# Patient Record
Sex: FEMALE | Race: WHITE | Employment: OTHER
[De-identification: names, ages, dates, MRNs, and addresses within clinical notes are randomized per-mention and may not be internally consistent; named-entity substitution may affect disease eponyms.]

---

## 2017-01-16 ENCOUNTER — TELEPHONE (OUTPATIENT)
Dept: CASE MANAGEMENT | Age: 57
End: 2017-01-16

## 2018-07-06 ASSESSMENT — ENCOUNTER SYMPTOMS
ABDOMINAL DISTENTION: 0
SINUS PRESSURE: 0
EYE ITCHING: 0
VOMITING: 0
ABDOMINAL PAIN: 0
CONSTIPATION: 0
WHEEZING: 0
SORE THROAT: 0
EYE DISCHARGE: 0
VOICE CHANGE: 0
NAUSEA: 0
EYE PAIN: 0
SINUS PAIN: 0
COUGH: 0
DIARRHEA: 0
BACK PAIN: 0
EYES NEGATIVE: 1
CHOKING: 0
RHINORRHEA: 0
ROS SKIN COMMENTS: DENIES BREAST SKIN CHANGES, ARM SWELLING, OR PALPABLE AXILLARY OR SUPRACLAVICULAR ADENOPATHY.
COLOR CHANGE: 0
APNEA: 0
CHEST TIGHTNESS: 0
SHORTNESS OF BREATH: 0
BLOOD IN STOOL: 0
TROUBLE SWALLOWING: 0

## 2018-07-06 NOTE — PROGRESS NOTES
Subjective:  Stage I ER/UT positive, HER-2/luis negative ID carcinoma of the right breast.  Oncotype DX recurrence score of 5. Patient ID: Charmayne Dubois is a 59 y.o. female. HPI  Patient is here for a follow up visit of her Stage I ER/UT positive, HER-2/luis negative carcinoma of the right breast.    She underwent a right simple mastectomy, right axillary sentinel lymph node excision, right axillary dissection on March 2, 2016. Pathological evaluation demonstrated:  A. 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 Tumor Size: Size of Largest Invasive Carcinoma 2.0 cm. Margins of excision are negative for invasive carcinoma. Skin and nipple, negative for invasive carcinoma  B. Right sentinel lymph nodes #1: Two lymph nodes, negative for neoplasm;  C. [place] lymph node #2: Single lymph node, negative for neoplasm;  D. Upper inner chest wall mass: Invasive ductal carcinoma involving fibrovascular and neurovascular tissue, negative for lymph node. Pathologic Staging:  Primary tumor- pT 1c  Regional lymph nodes-(sn)pN 0(i-)  Distant metastases- pM x  Additional Pathologic Findings-invasive ductal carcinoma involving soft tissue of upper/inner quadrant chest    Additional note: The invasive carcinoma present in the specimen designated as upper/inner quadrant is discontinuous from the centrally located invasive carcinoma and does not involve lymphoid tissue. Tumor involves the inked margins of excision in this specimen. Intradepartmental consultation is obtained.  Case discussed with Dr. Michelle Blake 3/7/2016.      1/16/17, Left diagnostic mammogram, [facility]:  TISSUE DENSITY:   There are scattered fibroglandular densities (Type 2 density).       MAMMOGRAM FINDINGS:   Finding 1:   No suspicious masses, calcifications, or other abnormalities are seen. [place] are no significant changes from the prior study.       IMPRESSION:   No mammographic evidence of malignancy.       Screening mammogram in 1 year is recommended.       =======================================   BI-RADS Category 1:  Negative   =======================================     Past Medical History:   Diagnosis Date    Anxiety     Cancer ([address]     breast right    GERD (gastroesophageal reflux disease)     Headache     Hyperlipidemia     Hypertension     PONV (postoperative nausea and vomiting)      Past Surgical History:   Procedure Laterality Date    APPENDECTOMY      CARDIOVASCULAR STRESS TEST      CHOLECYSTECTOMY  1984    COLONOSCOPY      ENDOSCOPY, COLON, DIAGNOSTIC      HERNIA REPAIR  2009,2010    HYSTERECTOMY  2009    MASTECTOMY Right 2015    simple, blue dye, with right axillary sentinel lymph node excision    UPPER GASTROINTESTINAL ENDOSCOPY  04/03/2017     Current Outpatient Prescriptions on File Prior to Visit   Medication Sig Dispense Refill    b complex vitamins capsule Take 1 capsule by mouth daily      omeprazole (PRILOSEC OTC) 20 MG tablet TAKE ONE TABLET BY MOUTH ONCE DAILY WITH BREAKFAST 30 tablet 5    lisinopril (PRINIVIL;ZESTRIL) 5 MG tablet TAKE ONE TABLET BY MOUTH DAILY 30 tablet 5    ibuprofen (ADVIL;MOTRIN) 200 MG tablet Take 200 mg by mouth every 6 hours as needed for Pain      azelastine (ASTELIN) 0.1 % nasal spray 2 sprays by Nasal route 2 times daily Use in each nostril as directed 1 Bottle 3    fluticasone (FLONASE) 50 MCG/ACT nasal spray 2 sprays by Nasal route daily 1 Bottle 3    RESTASIS 0.05 % ophthalmic emulsion Apply 1 drop to eye 2 times daily  0    ondansetron (ZOFRAN ODT) 4 MG disintegrating tablet Take 1 tablet by mouth every 8 hours as needed for Nausea or Vomiting (Nausea or Dry heaves) 15 tablet 0    ibandronate (BONIVA) 150 MG tablet Take 1 tablet by mouth every 30 days      Calcium Carbonate-Vit D-Min (CALCIUM 1200 PO) Take 1 tablet by mouth daily Last dose 3/2/2017      anastrozole (ARIMIDEX) 1 MG tablet Take 1 mg by mouth daily      triamcinolone (KENALOG) 0.1 Normocephalic and atraumatic. Mouth/Throat: Oropharynx is clear and moist. No oropharyngeal exudate. Eyes: Conjunctivae and EOM are normal. Right eye exhibits no discharge. Left eye exhibits no discharge. No scleral icterus. Neck: Normal range of motion. Neck supple. No JVD present. No tracheal deviation present. No thyromegaly present. Cardiovascular: Normal rate, regular rhythm and normal heart sounds. Exam reveals no gallop and no friction rub. No murmur heard. Pulmonary/Chest: Effort normal and breath sounds normal. No stridor. No respiratory distress. She exhibits no mass, no tenderness, no bony tenderness, no laceration, no edema, no deformity, no swelling and no retraction. Right breast exhibits no inverted nipple, no mass, no nipple discharge, no skin change and no tenderness. Left breast exhibits no inverted nipple, no mass, no nipple discharge, no skin change and no tenderness. Breasts are asymmetrical.       Right mastectomy scar intact, no nodules or masses. No axillary adenopathy. Abdominal: Soft. She exhibits no distension. There is no tenderness. There is no rebound and no guarding. Musculoskeletal: Normal range of motion. She exhibits no edema, tenderness or deformity. Right shoulder: Normal.        Left shoulder: Normal.   Lymphadenopathy:     She has no cervical adenopathy. Right cervical: No superficial cervical, no deep cervical and no posterior cervical adenopathy present. Left cervical: No superficial cervical, no deep cervical and no posterior cervical adenopathy present. She has no axillary adenopathy. Right axillary: No pectoral and no lateral adenopathy present. Left axillary: No pectoral and no lateral adenopathy present. Right: No supraclavicular adenopathy present. Left: No supraclavicular adenopathy present. Neurological: She is alert and oriented to person, place, and time.  Coordination normal.   Skin: Skin is warm and dry. No rash noted. She is not diaphoretic. No erythema. No pallor. Psychiatric: She has a normal mood and affect. Her behavior is normal. Judgment and thought content normal.   Nursing note and vitals reviewed. Assessment:      59 y.o. woman without unusual risk factors for carcinoma of the breast, who underwent a right simple mastectomy, right axillary sentinel lymph node excision, right axillary dissection on March 2, 2016. Pathological evaluation demonstrated:  A. 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 Tumor Size: Size of Largest Invasive Carcinoma 2.0 cm. Margins of excision are negative for invasive carcinoma. Skin and nipple, negative for invasive carcinoma  B. Right sentinel lymph nodes #1: Two lymph nodes, negative for neoplasm;  C. [place] lymph node #2: Single lymph node, negative for neoplasm;  D. Upper inner chest wall mass: Invasive ductal carcinoma involving fibrovascular and neurovascular tissue, negative for lymph node. Pathologic Staging:  Primary tumor- pT 1c  Regional lymph nodes-(sn)pN 0(i-)  Distant metastases- pM x  Additional Pathologic Findings-invasive ductal carcinoma involving soft tissue of upper/inner quadrant chest    Additional note: The invasive carcinoma present in the specimen designated as upper/inner quadrant is discontinuous from the centrally located invasive carcinoma and does not involve lymphoid tissue. Tumor involves the inked margins of excision in this specimen. Intradepartmental consultation is obtained. Case discussed with Dr. Chris Haynes 3/7/2016.    -Oncotype DX recurrence score 5 (low risk). -Radiation therapy: Completed 5/24/2016.   -Endocrine therapy:  Started on Arimidex in May 2016 per Dr. Rani Paula with continued good tolerance to date.  -1/17/18, Left mammogram [facility]):  Negative.  -09/26/2018:  DEXA Bone density:  Osteoporosis of LS; Normal bone density of the femoral neck.   On Ca/Vit D and Boniva. Clinically, there is no evidence of recurrent breast cancer.      -Right chest wall and lateral rib pain: Resolved. .        - Persistent tobacco abuse:  Has cut back significantly. Stress of her mother's recent death has increased desire smoking. She was told she is ineligible for the CT screen for smokers. Patient currently smokes 1/2 pack a day         Plan:        1. Continue monthly breast self examination. Bring any changes to your physician's attention. 2. Routine screening imaging in January 2019 (ordered). 3. Continue Arimidex 1 mg daily. 4. Calcium and vitamin D daily while on Arimidex. Also takes Boniva monthly. 5. Smoking cessation reviewed again today. 6. DEXA 09/21/2019 (ordered). 7. Avoid excessive caffeine intake. 8. Oncology appointments as scheduled with Oncologist-Dr. Rani Paula, Radiation Oncologist-Casey, and primary care. 9. Follow up in 6 months with mammogram (Left) same day. Nina Puente, RN, MSN, ACNP-BC, AOCNP  Advanced Oncology Certified Nurse Practitioner  Department of Breast Surgery  [facility]/  [facility] in collaboration with Dr. Author Hunter. Jaime Paula and Dr. Ilda Esquivel.

## 2018-07-16 ENCOUNTER — TELEPHONE (OUTPATIENT)
Dept: FAMILY MEDICINE CLINIC | Age: 58
End: 2018-07-16

## 2018-07-16 DIAGNOSIS — I10 ESSENTIAL HYPERTENSION: ICD-10-CM

## 2018-07-16 DIAGNOSIS — Z78.0 POSTMENOPAUSAL: Primary | ICD-10-CM

## 2018-07-16 DIAGNOSIS — Z79.810 USE OF TAMOXIFEN (NOLVADEX): ICD-10-CM

## 2018-07-17 ENCOUNTER — HOSPITAL ENCOUNTER (OUTPATIENT)
Age: 58
Discharge: HOME | End: 2018-07-19
Payer: COMMERCIAL

## 2018-07-17 ENCOUNTER — NURSE ONLY (OUTPATIENT)
Dept: FAMILY MEDICINE CLINIC | Age: 58
End: 2018-07-17
Payer: COMMERCIAL

## 2018-07-17 DIAGNOSIS — Z79.810 USE OF TAMOXIFEN (NOLVADEX): ICD-10-CM

## 2018-07-17 DIAGNOSIS — Z79.810 USE OF TAMOXIFEN (NOLVADEX): Primary | ICD-10-CM

## 2018-07-17 DIAGNOSIS — I10 ESSENTIAL HYPERTENSION: ICD-10-CM

## 2018-07-17 DIAGNOSIS — Z78.0 POSTMENOPAUSAL: ICD-10-CM

## 2018-07-17 LAB
ALBUMIN SERPL-MCNC: 4.2 G/DL (ref 3.5–5.2)
ALP BLD-CCNC: 66 U/L (ref 35–104)
ALT SERPL-CCNC: 18 U/L (ref 0–32)
ANION GAP SERPL CALCULATED.3IONS-SCNC: 17 MMOL/L (ref 7–16)
AST SERPL-CCNC: 26 U/L (ref 0–31)
BASOPHILS ABSOLUTE: 0.04 E9/L (ref 0–0.2)
BASOPHILS RELATIVE PERCENT: 0.5 % (ref 0–2)
BILIRUB SERPL-MCNC: <0.2 MG/DL (ref 0–1.2)
BUN BLDV-MCNC: 15 MG/DL (ref 8–23)
CALCIUM SERPL-MCNC: 9.6 MG/DL (ref 8.6–10.2)
CHLORIDE BLD-SCNC: 102 MMOL/L (ref 98–107)
CHOLESTEROL, TOTAL: 248 MG/DL (ref 0–199)
CO2: 21 MMOL/L (ref 22–29)
CREAT SERPL-MCNC: 1 MG/DL (ref 0.5–1)
EOSINOPHILS ABSOLUTE: 0.11 E9/L (ref 0.05–0.5)
EOSINOPHILS RELATIVE PERCENT: 1.5 % (ref 0–6)
GFR AFRICAN AMERICAN: >60
GFR NON-AFRICAN AMERICAN: 56 ML/MIN/1.73
GLUCOSE BLD-MCNC: 91 MG/DL (ref 74–109)
HCT VFR BLD CALC: 40.7 % (ref 34–48)
HDLC SERPL-MCNC: 26 MG/DL
HEMOGLOBIN: 12.6 G/DL (ref 11.5–15.5)
IMMATURE GRANULOCYTES #: 0.03 E9/L
IMMATURE GRANULOCYTES %: 0.4 % (ref 0–5)
LDL CHOLESTEROL CALCULATED: ABNORMAL MG/DL (ref 0–99)
LYMPHOCYTES ABSOLUTE: 1.44 E9/L (ref 1.5–4)
LYMPHOCYTES RELATIVE PERCENT: 19.5 % (ref 20–42)
MCH RBC QN AUTO: 25.1 PG (ref 26–35)
MCHC RBC AUTO-ENTMCNC: 31 % (ref 32–34.5)
MCV RBC AUTO: 81.1 FL (ref 80–99.9)
MONOCYTES ABSOLUTE: 0.47 E9/L (ref 0.1–0.95)
MONOCYTES RELATIVE PERCENT: 6.4 % (ref 2–12)
NEUTROPHILS ABSOLUTE: 5.28 E9/L (ref 1.8–7.3)
NEUTROPHILS RELATIVE PERCENT: 71.7 % (ref 43–80)
PDW BLD-RTO: 16.5 FL (ref 11.5–15)
PLATELET # BLD: 391 E9/L (ref 130–450)
PMV BLD AUTO: 9.7 FL (ref 7–12)
POTASSIUM SERPL-SCNC: 4.9 MMOL/L (ref 3.5–5)
RBC # BLD: 5.02 E12/L (ref 3.5–5.5)
SODIUM BLD-SCNC: 140 MMOL/L (ref 132–146)
TOTAL PROTEIN: 8 G/DL (ref 6.4–8.3)
TRIGL SERPL-MCNC: 514 MG/DL (ref 0–149)
VITAMIN D 25-HYDROXY: 27 NG/ML (ref 30–100)
VLDLC SERPL CALC-MCNC: ABNORMAL MG/DL
WBC # BLD: 7.4 E9/L (ref 4.5–11.5)

## 2018-07-17 PROCEDURE — 80053 COMPREHEN METABOLIC PANEL: CPT

## 2018-07-17 PROCEDURE — 85025 COMPLETE CBC W/AUTO DIFF WBC: CPT

## 2018-07-17 PROCEDURE — 36415 COLL VENOUS BLD VENIPUNCTURE: CPT | Performed by: FAMILY MEDICINE

## 2018-07-17 PROCEDURE — 82306 VITAMIN D 25 HYDROXY: CPT

## 2018-07-17 PROCEDURE — 80061 LIPID PANEL: CPT

## 2018-07-20 DIAGNOSIS — I10 ESSENTIAL HYPERTENSION: ICD-10-CM

## 2018-07-20 RX ORDER — LISINOPRIL 5 MG/1
TABLET ORAL
Qty: 30 TABLET | Refills: 5 | Status: SHIPPED | OUTPATIENT
Start: 2018-07-20 | End: 2019-01-15 | Stop reason: SDUPTHER

## 2018-07-20 RX ORDER — OMEPRAZOLE 20 MG/1
TABLET, DELAYED RELEASE ORAL
Qty: 30 TABLET | Refills: 5 | Status: SHIPPED | OUTPATIENT
Start: 2018-07-20 | End: 2019-01-15 | Stop reason: SDUPTHER

## 2018-07-26 ENCOUNTER — OFFICE VISIT (OUTPATIENT)
Dept: FAMILY MEDICINE CLINIC | Age: 58
End: 2018-07-26
Payer: COMMERCIAL

## 2018-07-26 DIAGNOSIS — E78.1 HYPERTRIGLYCERIDEMIA: ICD-10-CM

## 2018-07-26 DIAGNOSIS — Z12.11 COLON CANCER SCREENING: ICD-10-CM

## 2018-07-26 DIAGNOSIS — I10 ESSENTIAL HYPERTENSION: Primary | ICD-10-CM

## 2018-07-26 DIAGNOSIS — F43.22 ADJUSTMENT DISORDER WITH ANXIOUS MOOD: ICD-10-CM

## 2018-07-26 PROCEDURE — G8417 CALC BMI ABV UP PARAM F/U: HCPCS | Performed by: FAMILY MEDICINE

## 2018-07-26 PROCEDURE — 4004F PT TOBACCO SCREEN RCVD TLK: CPT | Performed by: FAMILY MEDICINE

## 2018-07-26 PROCEDURE — G8427 DOCREV CUR MEDS BY ELIG CLIN: HCPCS | Performed by: FAMILY MEDICINE

## 2018-07-26 PROCEDURE — 3014F SCREEN MAMMO DOC REV: CPT | Performed by: FAMILY MEDICINE

## 2018-07-26 PROCEDURE — 99213 OFFICE O/P EST LOW 20 MIN: CPT | Performed by: FAMILY MEDICINE

## 2018-07-26 PROCEDURE — 3017F COLORECTAL CA SCREEN DOC REV: CPT | Performed by: FAMILY MEDICINE

## 2018-07-26 ASSESSMENT — PATIENT HEALTH QUESTIONNAIRE - PHQ9
SUM OF ALL RESPONSES TO PHQ QUESTIONS 1-9: 1
SUM OF ALL RESPONSES TO PHQ9 QUESTIONS 1 & 2: 1
1. LITTLE INTEREST OR PLEASURE IN DOING THINGS: 0
2. FEELING DOWN, DEPRESSED OR HOPELESS: 1

## 2018-07-26 NOTE — PROGRESS NOTES
[facility]  Office Progress Note - Dr. Kamila Anderson  18    CC:   Chief Complaint   Patient presents with    Anxiety     6 mo check up, under a lot of stress- started smoking         S: Anxiety   Not feeling herself recently. Mom . Financial issues from that. Family is fighting. They are taking a vacation soon for \" a breather. \"  Started smoking again. She did well for about 2 months and then couldn't deal with the stress. She doesn't want meds or therapy. She isnt open to treatment options but does appear to benefit from venting today. Hypertension  Follow-up  Blood pressure is not controlled today. Initially. Better on recheck. BP Readings from Last 3 Encounters:   18 124/74   18 130/80   18 120/80     Patient continues medications regularly. Compliance is good. Denies CP, sob, abd pain, headaches, vision changes, dizziness, hypotensive symptoms. No side effects from medications noted. +frequent urination. She has noted some mild incontinence after she urinates. But never before. She feels like she is having complete emptying.      Past Medical History:   Diagnosis Date    Anxiety     Cancer [facility])     breast right    GERD (gastroesophageal reflux disease)     Headache     Hyperlipidemia     Hypertension     PONV (postoperative nausea and vomiting)        Family History   Problem Relation Age of Onset    High Blood Pressure Mother     Asthma Mother     Breast Cancer Mother     Cancer Mother          breast cancer    Heart Disease Father     High Blood Pressure Sister     High Blood Pressure Sister     Cancer Maternal Aunt         breast cancer    Cancer Other         Maternal Aunt Esophageal cancer       Past Surgical History:   Procedure Laterality Date    APPENDECTOMY      CARDIOVASCULAR STRESS TEST      CHOLECYSTECTOMY      COLONOSCOPY      ENDOSCOPY, COLON, DIAGNOSTIC      HERNIA REPAIR  [phone]    HYSTERECTOMY

## 2018-07-29 VITALS
DIASTOLIC BLOOD PRESSURE: 74 MMHG | SYSTOLIC BLOOD PRESSURE: 124 MMHG | OXYGEN SATURATION: 98 % | HEART RATE: 88 BPM | TEMPERATURE: 98.6 F | WEIGHT: 140 LBS | BODY MASS INDEX: 27.34 KG/M2

## 2018-08-09 ENCOUNTER — PREP FOR PROCEDURE (OUTPATIENT)
Dept: SURGERY | Age: 58
End: 2018-08-09

## 2018-08-09 ENCOUNTER — OFFICE VISIT (OUTPATIENT)
Dept: SURGERY | Age: 58
End: 2018-08-09
Payer: COMMERCIAL

## 2018-08-09 VITALS
OXYGEN SATURATION: 97 % | SYSTOLIC BLOOD PRESSURE: 122 MMHG | HEART RATE: 83 BPM | TEMPERATURE: 98.3 F | WEIGHT: 140 LBS | HEIGHT: 60 IN | RESPIRATION RATE: 16 BRPM | BODY MASS INDEX: 27.48 KG/M2 | DIASTOLIC BLOOD PRESSURE: 84 MMHG

## 2018-08-09 DIAGNOSIS — K62.5 RECTAL BLEEDING: Primary | ICD-10-CM

## 2018-08-09 PROCEDURE — G8427 DOCREV CUR MEDS BY ELIG CLIN: HCPCS | Performed by: SURGERY

## 2018-08-09 PROCEDURE — G8417 CALC BMI ABV UP PARAM F/U: HCPCS | Performed by: SURGERY

## 2018-08-09 PROCEDURE — 4004F PT TOBACCO SCREEN RCVD TLK: CPT | Performed by: SURGERY

## 2018-08-09 PROCEDURE — 3014F SCREEN MAMMO DOC REV: CPT | Performed by: SURGERY

## 2018-08-09 PROCEDURE — 3017F COLORECTAL CA SCREEN DOC REV: CPT | Performed by: SURGERY

## 2018-08-09 PROCEDURE — 99213 OFFICE O/P EST LOW 20 MIN: CPT | Performed by: SURGERY

## 2018-08-09 RX ORDER — 0.9 % SODIUM CHLORIDE 0.9 %
10 VIAL (ML) INJECTION EVERY 12 HOURS SCHEDULED
Status: CANCELLED | OUTPATIENT
Start: 2018-08-09 | End: 2019-08-09

## 2018-08-09 RX ORDER — 0.9 % SODIUM CHLORIDE 0.9 %
10 VIAL (ML) INJECTION PRN
Status: CANCELLED | OUTPATIENT
Start: 2018-08-09 | End: 2019-08-09

## 2018-08-09 RX ORDER — SODIUM CHLORIDE 9 MG/ML
INJECTION, SOLUTION INTRAVENOUS CONTINUOUS
Status: CANCELLED | OUTPATIENT
Start: 2018-08-09 | End: 2019-08-09

## 2018-08-09 NOTE — PATIENT INSTRUCTIONS
Janey Arteaga MD, FACS    Preoperative Instructions    Please read the following information very carefully. It contains information that is necessary to best prepare you for your upcoming procedure. Make arrangements for a  to take you to and from your procedure. YOU MUST HAVE SOMEONE DRIVE YOU HOME - this cannot be a taxi or public transportation. You will not be administered anesthesia without someone to go home and be at home with you that day. Nothing to eat or drink after midnight the night before your procedure. Follow your bowel prep instructions if you have them for this procedure. 3 days prior to your procedure: Stop taking blood thinners like Coumadin or Plavix or Xarelto. 5 days prior to your procedure: Stop taking Aspirin or Aspirin containing products. If you cannot stop any of these medications prior to your procedure, please contact our office. Medications morning of procedure: Only heart, breathing, blood pressure, and seizure medications are permitted on the morning of your procedure. These medications can be taken with a sip of water. IF YOU ARE UNABLE TO KEEP THE ABOVE SCHEDULED PROCEDURE, YOU MUST NOTIFY DR. TSE'S OFFICE [phone]. NOT THE FACILITY. NO CHEWING GUM OR CHEWING TOBACCO AFTER MIDNIGHT ON DAY OF PROCEDURE.    YOU MUST HAVE TRANSPORTATION TO AND FROM THE FACILITY. What is a colonoscopy? A colonoscopy is a test that lets a doctor look inside your colon. The doctor uses a thin, lighted tube called a colonoscope to look for problems. These include small growths called polyps, cancer, or bleeding. During the test, the doctor can take samples of tissue that can be checked for cancer or other problems. This is called a biopsy. The doctor can also take out polyps. Before the test, you will need to stop eating solid foods. You also will drink a liquid or take a tablet that cleans out your colon.  This helps your and grape-flavored ice pops. It also includes fruit punch and cherry gelatin. · Drink the \"colon prep\" liquid as your doctor tells you. You will want to stay home, because the liquid will make you go to the bathroom a lot. Your stools will be loose and watery. It is very important to drink all of the liquid. If you have problems drinking it, call your doctor. Some doctors may have you take a tablet rather than drink a liquid. · Do not eat any solid foods after you drink the colon prep. · Stop drinking clear liquids 6 to 8 hours before the test.  What happens on the day of the procedure? · Follow the instructions exactly about when to stop eating and drinking. If you don't, your procedure may be canceled. If your doctor told you to take your medicines on the day of the procedure, take them with only a sip of water. · Take a bath or shower before you come in for your procedure. Do not apply lotions, perfumes, deodorants, or nail polish. · Take off all jewelry and piercings. And take out contact lenses, if you wear them. At the [address] [place] [address] or hospital  · Bring a picture ID. · You will be kept comfortable and safe by your anesthesia provider. The anesthesia may make you sleep. · You will lie on your back or your side with your knees drawn up toward your belly. The doctor will gently put a gloved finger into your anus. Then the doctor puts the scope in and moves it into your colon. The scope goes in easily because it is lubricated. · The doctor may also use small tools to take tissue samples for a biopsy or to remove polyps. This does not hurt. · The test usually takes 30 to 45 minutes. But it may take longer. It depends on what is found and what is done. Going home  · Be sure you have someone to drive you home. Anesthesia and pain medicine make it unsafe for you to drive. · You will be given more specific instructions about recovering from your procedure. When should you call your doctor?   · You have questions or concerns. · You don't understand how to prepare for your procedure. · You are having trouble with the bowel prep. · You become ill before the procedure (such as fever, flu, or a cold). · You need to reschedule or have changed your mind about having the procedure. Where can you learn more? Go to [email or web address] org and sign in to your [employer] account. Enter C315 in the ShopWell box to learn more about Colonoscopy: Before Your Procedure.     If you do not have an account, please click on the Sign Up Now link. © 7400-0806 Healthwise, Incorporated. Care instructions adapted under license by [facility] ([facility]). This care instruction is for use with your licensed healthcare professional. If you have questions about a medical condition or this instruction, always ask your healthcare professional. [address] any warranty or liability for your use of this information.   Content Version: 84.5.420859; Current as of: November 20, 2015

## 2018-08-10 NOTE — PROGRESS NOTES
PROCEDURE DATE ABD TIME - PT NOTIFIED AND GIVEN INSTRUCTIONS  PLEASE SEE PROCEDURE PACKET ([id number] [facility]) SCANNED INTO MEDIA TAB.   SCHEDULED AT [facility] FOR AN OUTPATIENT PROCEDURE  COLONOSCOPY  Electronically signed by Julia Dupont MA on 8/10/2018 at 7:44 AM

## 2018-09-07 NOTE — PROGRESS NOTES
Ben PRE-ADMISSION TESTING INSTRUCTIONS    The Preadmission Testing patient is instructed accordingly using the following criteria (check applicable):    ARRIVAL INSTRUCTIONS:  [x] Parking the day of Surgery is located in the Main Entrance lot. Upon entering the door, make an immediate right to the surgery reception desk    [x] Complimentary [address] is available Monday through Friday 6 am to 6 pm    [x] Bring photo ID and insurance card    [] Bring in a copy of Living will or Durable Power of  papers. [x] Please be sure to arrange for responsible adult to provide transportation to and from the hospital    [x] Please arrange for responsible adult to be with you for the 24 hour period post procedure due to having anesthesia      GENERAL INSTRUCTIONS:    [x] Nothing by mouth after midnight, including gum, candy, mints or water    [x] You may brush your teeth, but do not swallow any water    [x] Take medications as instructed with 1-2 oz of water    [x] Stop herbal supplements and vitamins 5 days prior to procedure    [] Follow preop dosing of blood thinners per physician instructions    [] Take 1/2 dose of evening insulin, but no insulin after midnight    [] No oral diabetic medications after midnight    [] If diabetic and have low blood sugar or feel symptomatic, take 1-2oz apple juice only    [] Bring inhalers day of surgery    [] Bring C-PAP/ Bi-Pap day of surgery    [] Bring urine specimen day of surgery    [x] Shower or bath with soap, lather and rinse well, AM of Surgery, no lotion, powders or creams to surgical site    [x] Follow bowel prep as instructed per surgeon    [x] No tobacco products within 24 hours of surgery     [x] No alcohol or illegal drug use within 24 hours of surgery.     [x] Jewelry, body piercing's, eyeglasses, contact lenses and dentures are not permitted into surgery (bring cases)      [x] Please do not wear any nail polish, make up or hair products on the day of surgery    [x] If not already done, you can expect a call from registration    [x] You can expect a call the business day prior to procedure to notify you if your arrival time changes    [x] If you receive a survey after surgery we would greatly appreciate your comments    [] Parent/guardian of a minor must accompany their child and remain on the premises  the entire time they are under our care     [] Pediatric patients may bring favorite toy, blanket or comfort item with them    [] A caregiver or family member must remain with the patient during their stay if they are mentally handicapped, have dementia, disoriented or unable to use a call light or would be a safety concern if left unattended    [x] Please notify surgeon if you develop any illness between now and time of surgery (cold, cough, sore throat, fever, nausea, vomiting) or any signs of infections  including skin, wounds, and dental.    [] Other instructions    EDUCATIONAL MATERIALS PROVIDED:    [] PAT Preoperative Education Packet/Booklet     [] Medication List    [] Fluoroscopy Information Pamphlet    [] Transfusion bracelet applied with instructions    [] Joint replacement video reviewed    [] Shower with soap, lather and rinse well, and use CHG wipes provided the evening before surgery as instructed

## 2018-09-17 ENCOUNTER — HOSPITAL ENCOUNTER (OUTPATIENT)
Age: 58
Setting detail: OUTPATIENT SURGERY
Discharge: HOME | End: 2018-09-17
Attending: SURGERY | Admitting: SURGERY
Payer: COMMERCIAL

## 2018-09-17 ENCOUNTER — ANESTHESIA EVENT (OUTPATIENT)
Dept: ENDOSCOPY | Age: 58
End: 2018-09-17
Payer: COMMERCIAL

## 2018-09-17 ENCOUNTER — ANESTHESIA (OUTPATIENT)
Dept: ENDOSCOPY | Age: 58
End: 2018-09-17
Payer: COMMERCIAL

## 2018-09-17 VITALS
DIASTOLIC BLOOD PRESSURE: 70 MMHG | TEMPERATURE: 98 F | WEIGHT: 140 LBS | OXYGEN SATURATION: 97 % | RESPIRATION RATE: 15 BRPM | HEART RATE: 67 BPM | BODY MASS INDEX: 27.48 KG/M2 | SYSTOLIC BLOOD PRESSURE: 125 MMHG | HEIGHT: 60 IN

## 2018-09-17 VITALS — SYSTOLIC BLOOD PRESSURE: 90 MMHG | DIASTOLIC BLOOD PRESSURE: 50 MMHG | OXYGEN SATURATION: 94 %

## 2018-09-17 PROCEDURE — 45380 COLONOSCOPY AND BIOPSY: CPT | Performed by: SURGERY

## 2018-09-17 PROCEDURE — 3700000001 HC ADD 15 MINUTES (ANESTHESIA): Performed by: SURGERY

## 2018-09-17 PROCEDURE — 88305 TISSUE EXAM BY PATHOLOGIST: CPT

## 2018-09-17 PROCEDURE — 3700000000 HC ANESTHESIA ATTENDED CARE: Performed by: SURGERY

## 2018-09-17 PROCEDURE — 3609010600 HC COLONOSCOPY POLYPECTOMY SNARE/COLD BIOPSY: Performed by: SURGERY

## 2018-09-17 PROCEDURE — 7100000011 HC PHASE II RECOVERY - ADDTL 15 MIN: Performed by: SURGERY

## 2018-09-17 PROCEDURE — 7100000010 HC PHASE II RECOVERY - FIRST 15 MIN: Performed by: SURGERY

## 2018-09-17 PROCEDURE — 6360000002 HC RX W HCPCS: Performed by: NURSE ANESTHETIST, CERTIFIED REGISTERED

## 2018-09-17 PROCEDURE — 2709999900 HC NON-CHARGEABLE SUPPLY: Performed by: SURGERY

## 2018-09-17 PROCEDURE — 2580000003 HC RX 258: Performed by: SURGERY

## 2018-09-17 RX ORDER — SODIUM CHLORIDE 0.9 % (FLUSH) 0.9 %
10 SYRINGE (ML) INJECTION EVERY 12 HOURS SCHEDULED
Status: DISCONTINUED | OUTPATIENT
Start: 2018-09-17 | End: 2018-09-17 | Stop reason: HOSPADM

## 2018-09-17 RX ORDER — SODIUM CHLORIDE 0.9 % (FLUSH) 0.9 %
10 SYRINGE (ML) INJECTION PRN
Status: DISCONTINUED | OUTPATIENT
Start: 2018-09-17 | End: 2018-09-17 | Stop reason: HOSPADM

## 2018-09-17 RX ORDER — SODIUM CHLORIDE 9 MG/ML
INJECTION, SOLUTION INTRAVENOUS CONTINUOUS
Status: DISCONTINUED | OUTPATIENT
Start: 2018-09-17 | End: 2018-09-17 | Stop reason: HOSPADM

## 2018-09-17 RX ORDER — PROPOFOL 10 MG/ML
INJECTION, EMULSION INTRAVENOUS PRN
Status: DISCONTINUED | OUTPATIENT
Start: 2018-09-17 | End: 2018-09-17 | Stop reason: SDUPTHER

## 2018-09-17 RX ADMIN — PROPOFOL 280 MG: 10 INJECTION, EMULSION INTRAVENOUS at 09:29

## 2018-09-17 RX ADMIN — SODIUM CHLORIDE: 9 INJECTION, SOLUTION INTRAVENOUS at 09:22

## 2018-09-17 ASSESSMENT — PAIN - FUNCTIONAL ASSESSMENT: PAIN_FUNCTIONAL_ASSESSMENT: 0-10

## 2018-09-17 ASSESSMENT — PAIN SCALES - GENERAL
PAINLEVEL_OUTOF10: 0
PAINLEVEL_OUTOF10: 0

## 2018-09-17 NOTE — ANESTHESIA PRE PROCEDURE
Department of Anesthesiology  Preprocedure Note       Name:  Rachelle Banuelos   Age:  59 y.o.  :  1960                                          MRN:  [id number]         Date:  2018      Surgeon: Chapis Wheat):  Erickson Rosario MD    Procedure: Procedure(s):  COLONOSCOPY SCREENING    Medications prior to admission:   Prior to Admission medications    Medication Sig Start Date End Date Taking? Authorizing Provider   omeprazole (PRILOSEC OTC) 20 MG tablet TAKE ONE TABLET BY MOUTH ONCE DAILY WITH BREAKFAST 18  Yes Iwona Dougherty MD   lisinopril (PRINIVIL;ZESTRIL) 5 MG tablet TAKE ONE TABLET BY MOUTH DAILY 18  Yes Iwona Dougherty MD   b complex vitamins capsule Take 1 capsule by mouth daily Ld 2018   Yes Historical Provider, MD   ibuprofen (ADVIL;MOTRIN) 200 MG tablet Take 200 mg by mouth every 6 hours as needed for Pain   Yes Historical Provider, MD   RESTASIS 0.05 % ophthalmic emulsion Apply 1 drop to eye 2 times daily 3/21/17  Yes Historical Provider, MD   ibandronate (BONIVA) 150 MG tablet Take 1 tablet by mouth every 30 days 9/15/16  Yes Historical Provider, MD   Calcium Carbonate-Vit D-Min (CALCIUM 1200 PO) Take 1 tablet by mouth daily Last dose 2018   Yes Historical Provider, MD   anastrozole (ARIMIDEX) 1 MG tablet Take 1 mg by mouth daily   Yes Historical Provider, MD   azelastine (ASTELIN) 0.1 % nasal spray 2 sprays by Nasal route 2 times daily Use in each nostril as directed 17   Court Marilyn Isaacs DO   fluticasone [facility]) 50 MCG/ACT nasal spray 2 sprays by Nasal route daily 17   Court Marilyn Isaacs DO   ondansetron (ZOFRAN ODT) 4 MG disintegrating tablet Take 1 tablet by mouth every 8 hours as needed for Nausea or Vomiting (Nausea or Dry heaves) 3/23/17   Iwona Dougherty MD   triamcinolone (KENALOG) 0.1 % ointment Apply topically 2 times daily as needed Apply topically 2 times daily.     Historical Provider, MD       Current medications: sounds clear to auscultation                             Cardiovascular:  Exercise tolerance: good (>4 METS),   (+) hypertension:,       ECG reviewed  Rhythm: regular  Rate: normal           Beta Blocker:  Not on Beta Blocker         Neuro/Psych:   (+) headaches:,             GI/Hepatic/Renal:   (+) GERD: well controlled,           Endo/Other:    (+) malignancy/cancer (breast). Abdominal:           Vascular: negative vascular ROS. Anesthesia Plan      MAC     ASA 3       Induction: intravenous. Anesthetic plan and risks discussed with patient.       Plan discussed with CRNA and surgical team.                  Caleb Alberto MD   9/17/2018

## 2018-09-17 NOTE — H&P
Current Outpatient Prescriptions   Medication Sig Dispense Refill    omeprazole (PRILOSEC OTC) 20 MG tablet TAKE ONE TABLET BY MOUTH ONCE DAILY WITH BREAKFAST 30 tablet 5    lisinopril (PRINIVIL;ZESTRIL) 5 MG tablet TAKE ONE TABLET BY MOUTH DAILY 30 tablet 5    b complex vitamins capsule Take 1 capsule by mouth daily        ibuprofen (ADVIL;MOTRIN) 200 MG tablet Take 200 mg by mouth every 6 hours as needed for Pain        azelastine (ASTELIN) 0.1 % nasal spray 2 sprays by Nasal route 2 times daily Use in each nostril as directed 1 Bottle 3    fluticasone (FLONASE) 50 MCG/ACT nasal spray 2 sprays by Nasal route daily 1 Bottle 3    RESTASIS 0.05 % ophthalmic emulsion Apply 1 drop to eye 2 times daily   0    ondansetron (ZOFRAN ODT) 4 MG disintegrating tablet Take 1 tablet by mouth every 8 hours as needed for Nausea or Vomiting (Nausea or Dry heaves) 15 tablet 0    ibandronate (BONIVA) 150 MG tablet Take 1 tablet by mouth every 30 days        Calcium Carbonate-Vit D-Min (CALCIUM 1200 PO) Take 1 tablet by mouth daily Last dose 3/2/2017        anastrozole (ARIMIDEX) 1 MG tablet Take 1 mg by mouth daily        triamcinolone (KENALOG) 0.1 % ointment Apply topically 2 times daily as needed Apply topically 2 times daily.          No current facility-administered medications for this visit.              Social History:         Social History   Substance Use Topics    Smoking status: Current Every Day Smoker       Packs/day: 0.25       Years: 41.00       Types: Cigarettes       Last attempt to quit: 2/14/2016    Smokeless tobacco: Never Used    Alcohol use No         Family History:    Family History         Family History   Problem Relation Age of Onset    High Blood Pressure Mother      Asthma Mother      Breast Cancer Mother      Cancer Mother            breast cancer    Heart Disease Father      High Blood Pressure Sister      High Blood Pressure Sister      Cancer Maternal Aunt           breast cancer    Cancer Other           Maternal Aunt Esophageal cancer            REVIEW OF SYSTEMS:    Constitutional: negative  Eyes: negative  Ears, nose, mouth, throat, and face: negative  Respiratory: negative  Cardiovascular: negative  Gastrointestinal: as in HPI  Genitourinary:negative  Integument/breast: negative  Hematologic/lymphatic: negative  Musculoskeletal:negative  Neurological: negative  Allergic/Immunologic: negative     PHYSICAL EXAM   /84   Pulse 83   Temp 98.3 °F (36.8 °C) (Oral)   Resp 16   Ht 5' (1.524 m)   Wt 140 lb (63.5 kg)   SpO2 97%   BMI 27.34 kg/m²      General appearance: alert, cooperative and in no acute distress. Eyes: Grossly normal   Lungs: clear to auscultation bilaterally  Heart: regular rate and rhythm  Abdomen:  soft, non-tender; bowel sounds normal; no masses,  no organomegaly  Skin: No skin abnormalities  Neurologic: Alert and oriented x 3. Grossly normal  Musculoskeletal: No clubbing cyanosis or edema.           ASSESSMENT AND PLAN:       Assessment: Chinedu Menendez is an 59 y.o. female who presents for a colonoscopy with rectal bleeding, hemorrhoids    Plan: I will set the patient up for a colonoscopy, possible biopsy, possible polypectomy. I explained the risks including but not limited to bleeding, perforation leading to possible surgery, or infection. The benefits, alternatives, and potential complications associated with the above procedure to be performed and transfusions when applicable with the patient/responsible person prior to the procedure. I discussed the risk of bowel peroration, postoperative bleeding, post-polypectomy syndrome, as well as the possibility of needing emergency surgery or another colonoscopy. All of the patient's questions were answered.  The patient understands and agrees to the procedure.      Physician Signature: Electronically signed by Ish Junior MD, General Surgery     Send copy of H&P to PCP, Anjelica Eric MD and referring physician, Laz Lopez MD

## 2018-09-17 NOTE — PROGRESS NOTES
Admitted to [facility] e 2 recovery. VSS. Abdomen soft. Bowel sounds present. Passing flatus. Denies pain. Call light placed within reach. Family at bedside. 1020 VSS. Denies pain. Nourishment provided. Discharge instructions given to patient and family. Understanding verbalized. 1040 VSS. Denies pain. Ready for discharge. Waiting for doctor. 1110 Patient requesting to leave. Discharged via wheelchair with family to private vehicle.

## 2018-09-17 NOTE — ANESTHESIA POSTPROCEDURE EVALUATION
Department of Anesthesiology  Postprocedure Note    Patient: Marissa Kirk  MRN: [id number]  YOB: 1960  Date of evaluation: 9/17/2018  Time:  10:34 AM     Procedure Summary     Date:  09/17/18 Room / Location:  [place] [address] / [facility] ENDOSCOPY    Anesthesia Start:  1764 Anesthesia Stop:  6758    Procedure:  COLONOSCOPY POLYPECTOMY SNARE/COLD BIOPSY (N/A ) Diagnosis:  (SCREENING)    Surgeon:  Ada Romo MD Responsible Provider:  Catheryn Sandifer, MD    Anesthesia Type:  MAC ASA Status:  3          Anesthesia Type: MAC    Jake Phase I:      Jake Phase II: Jake Score: 10    Last vitals: Reviewed and per EMR flowsheets.        Anesthesia Post Evaluation    Patient location during evaluation: PACU  Patient participation: complete - patient participated  Level of consciousness: awake and alert  Pain score: 0  Airway patency: patent  Nausea & Vomiting: no vomiting and no nausea  Complications: no  Cardiovascular status: hemodynamically stable  Respiratory status: spontaneous ventilation  Hydration status: stable

## 2018-09-17 NOTE — OP NOTE
Operative Note: Colonoscopy    Della Petersen     DATE OF PROCEDURE: 9/17/2018  SURGEON: Dr. Jono Corado M.D.   Alessandra Bey:      PREOPERATIVE DIAGNOSES:    Screening    POSTOPERATIVE DIAGNOSES:  Moderately severe pan-diverticulosis  Polyps 25 cm, rectum  Internal hemorrhoids    OPERATION:   Colonoscopy to the cecum with forceps polypectomy x 3      ANESTHESIA: LMAC    COMPLICATIONS: None. Procedure Note:    CONSENT AND INDICATIONS:  This is a 59y.o. year old female who is having a screening colonoscopy today. I have discussed with the patient and/or the patient representative the indication, alternatives, and the possible risks and/or complications of the planned procedure and the anesthesia methods. The patient and/or patient representative appear to understand and agree to proceed. OPERATIONS: Bowel prep was done yesterday until the bowels were clear. The patient was placed on the table and sedated by anesthesia. A rectal exam was performed and no mass was felt. A lubricated scope was passed into the rectum which looked normal.  The scope was passed all the way around through the sigmoid, descending, transverse and ascending colon to the cecum. The bowel prep was clear. The colon was very tortuous and there was moderately severe pan-diverticulosis seen The cecum was identified by the appendiceal orifice, ileocecal valve, and light reflex in the RLQ. The scope was then slowly withdrawn, each area was examined again on the way out. There was a small polyp at 25 cm and two others in the rectum that were all removed via cold biopsy forceps polypectomy. The scope was retroflexed in the rectum and there were internal hemorrhoids seen. The patient tolerated the procedure well. PLAN: Follow up biopsies    Follow up colonoscopy in 3 years. Physician Signature: Electronically signed by Dr. Jono Corado M.D.  FACS    Send copy of H&P to PCP, Timothy Salazar MD and referring physician, Pavan Allan MD

## 2018-09-21 ENCOUNTER — TELEPHONE (OUTPATIENT)
Dept: SURGERY | Age: 58
End: 2018-09-21

## 2018-09-26 ENCOUNTER — OFFICE VISIT (OUTPATIENT)
Dept: BREAST CENTER | Age: 58
End: 2018-09-26
Payer: COMMERCIAL

## 2018-09-26 ENCOUNTER — HOSPITAL ENCOUNTER (OUTPATIENT)
Dept: GENERAL RADIOLOGY | Age: 58
Discharge: HOME | End: 2018-09-28
Payer: COMMERCIAL

## 2018-09-26 VITALS
WEIGHT: 137.9 LBS | TEMPERATURE: 98.4 F | RESPIRATION RATE: 16 BRPM | BODY MASS INDEX: 27.07 KG/M2 | DIASTOLIC BLOOD PRESSURE: 62 MMHG | OXYGEN SATURATION: 98 % | SYSTOLIC BLOOD PRESSURE: 104 MMHG | HEIGHT: 60 IN | HEART RATE: 82 BPM

## 2018-09-26 DIAGNOSIS — Z85.3 PERSONAL HISTORY OF BREAST CANCER: ICD-10-CM

## 2018-09-26 DIAGNOSIS — Z78.0 POSTMENOPAUSAL: ICD-10-CM

## 2018-09-26 DIAGNOSIS — Z78.0 POSTMENOPAUSAL: Primary | ICD-10-CM

## 2018-09-26 PROCEDURE — 4004F PT TOBACCO SCREEN RCVD TLK: CPT | Performed by: NURSE PRACTITIONER

## 2018-09-26 PROCEDURE — 99213 OFFICE O/P EST LOW 20 MIN: CPT | Performed by: NURSE PRACTITIONER

## 2018-09-26 PROCEDURE — 3014F SCREEN MAMMO DOC REV: CPT | Performed by: NURSE PRACTITIONER

## 2018-09-26 PROCEDURE — 3017F COLORECTAL CA SCREEN DOC REV: CPT | Performed by: NURSE PRACTITIONER

## 2018-09-26 PROCEDURE — G8417 CALC BMI ABV UP PARAM F/U: HCPCS | Performed by: NURSE PRACTITIONER

## 2018-09-26 PROCEDURE — 77080 DXA BONE DENSITY AXIAL: CPT

## 2018-09-26 PROCEDURE — G8427 DOCREV CUR MEDS BY ELIG CLIN: HCPCS | Performed by: NURSE PRACTITIONER

## 2018-10-22 ENCOUNTER — TELEPHONE (OUTPATIENT)
Dept: FAMILY MEDICINE CLINIC | Age: 58
End: 2018-10-22

## 2018-10-30 DIAGNOSIS — M81.0 OSTEOPOROSIS, UNSPECIFIED OSTEOPOROSIS TYPE, UNSPECIFIED PATHOLOGICAL FRACTURE PRESENCE: ICD-10-CM

## 2018-11-09 ENCOUNTER — TELEPHONE (OUTPATIENT)
Dept: FAMILY MEDICINE CLINIC | Age: 58
End: 2018-11-09

## 2018-11-09 DIAGNOSIS — Z01.812 PRE-PROCEDURE LAB EXAM: Primary | ICD-10-CM

## 2018-11-13 ENCOUNTER — HOSPITAL ENCOUNTER (OUTPATIENT)
Age: 58
Discharge: HOME | End: 2018-11-13
Payer: COMMERCIAL

## 2018-11-13 LAB
ANION GAP SERPL CALCULATED.3IONS-SCNC: 15 MMOL/L (ref 7–16)
BUN BLDV-MCNC: 14 MG/DL (ref 8–23)
CALCIUM SERPL-MCNC: 9.5 MG/DL (ref 8.6–10.2)
CHLORIDE BLD-SCNC: 101 MMOL/L (ref 98–107)
CO2: 25 MMOL/L (ref 22–29)
CREAT SERPL-MCNC: 1.1 MG/DL (ref 0.5–1)
GFR AFRICAN AMERICAN: >60
GFR NON-AFRICAN AMERICAN: 51 ML/MIN/1.73
GLUCOSE BLD-MCNC: 114 MG/DL (ref 74–99)
POTASSIUM SERPL-SCNC: 4.6 MMOL/L (ref 3.5–5)
SODIUM BLD-SCNC: 141 MMOL/L (ref 132–146)

## 2018-11-13 PROCEDURE — 36415 COLL VENOUS BLD VENIPUNCTURE: CPT

## 2018-11-13 PROCEDURE — 80048 BASIC METABOLIC PNL TOTAL CA: CPT

## 2018-11-13 RX ORDER — 0.9 % SODIUM CHLORIDE 0.9 %
10 VIAL (ML) INJECTION ONCE
Status: CANCELLED
Start: 2018-11-13 | End: 2018-11-13

## 2018-11-13 RX ORDER — EPINEPHRINE 1 MG/ML
0.3 INJECTION INTRAMUSCULAR; INTRAVENOUS; SUBCUTANEOUS EVERY 5 MIN PRN
Status: CANCELLED
Start: 2018-11-13

## 2018-11-13 RX ORDER — SODIUM CHLORIDE 9 MG/ML
INJECTION, SOLUTION INTRAVENOUS CONTINUOUS
Status: CANCELLED
Start: 2018-11-13

## 2018-11-13 RX ORDER — METHYLPREDNISOLONE SODIUM SUCCINATE 125 MG/2ML
125 INJECTION, POWDER, LYOPHILIZED, FOR SOLUTION INTRAMUSCULAR; INTRAVENOUS ONCE
Status: CANCELLED
Start: 2018-11-13 | End: 2018-11-13

## 2018-11-13 RX ORDER — DIPHENHYDRAMINE HYDROCHLORIDE 50 MG/ML
50 INJECTION INTRAMUSCULAR; INTRAVENOUS ONCE
Status: CANCELLED
Start: 2018-11-13 | End: 2018-11-13

## 2018-11-16 ENCOUNTER — HOSPITAL ENCOUNTER (OUTPATIENT)
Dept: INFUSION THERAPY | Age: 58
Setting detail: INFUSION SERIES
End: 2018-11-16
Payer: COMMERCIAL

## 2018-11-19 ENCOUNTER — HOSPITAL ENCOUNTER (OUTPATIENT)
Dept: INFUSION THERAPY | Age: 58
Setting detail: INFUSION SERIES
Discharge: HOME | End: 2018-11-19
Payer: COMMERCIAL

## 2018-11-19 VITALS
OXYGEN SATURATION: 97 % | SYSTOLIC BLOOD PRESSURE: 137 MMHG | RESPIRATION RATE: 16 BRPM | HEIGHT: 60 IN | DIASTOLIC BLOOD PRESSURE: 61 MMHG | TEMPERATURE: 98.7 F | BODY MASS INDEX: 27.48 KG/M2 | HEART RATE: 93 BPM | WEIGHT: 140 LBS

## 2018-11-19 DIAGNOSIS — M81.0 OSTEOPOROSIS, UNSPECIFIED OSTEOPOROSIS TYPE, UNSPECIFIED PATHOLOGICAL FRACTURE PRESENCE: ICD-10-CM

## 2018-11-19 PROCEDURE — 96372 THER/PROPH/DIAG INJ SC/IM: CPT

## 2018-11-19 PROCEDURE — 6360000002 HC RX W HCPCS: Performed by: FAMILY MEDICINE

## 2018-11-19 RX ORDER — METHYLPREDNISOLONE SODIUM SUCCINATE 125 MG/2ML
125 INJECTION, POWDER, LYOPHILIZED, FOR SOLUTION INTRAMUSCULAR; INTRAVENOUS ONCE
Status: CANCELLED
Start: 2018-11-19 | End: 2018-11-19

## 2018-11-19 RX ORDER — 0.9 % SODIUM CHLORIDE 0.9 %
10 VIAL (ML) INJECTION ONCE
Status: CANCELLED
Start: 2018-11-19 | End: 2018-11-19

## 2018-11-19 RX ORDER — SODIUM CHLORIDE 9 MG/ML
INJECTION, SOLUTION INTRAVENOUS CONTINUOUS
Status: CANCELLED
Start: 2018-11-19

## 2018-11-19 RX ORDER — DIPHENHYDRAMINE HYDROCHLORIDE 50 MG/ML
50 INJECTION INTRAMUSCULAR; INTRAVENOUS ONCE
Status: CANCELLED
Start: 2018-11-19 | End: 2018-11-19

## 2018-11-19 RX ORDER — EPINEPHRINE 1 MG/ML
0.3 INJECTION INTRAMUSCULAR; INTRAVENOUS; SUBCUTANEOUS EVERY 5 MIN PRN
Status: CANCELLED
Start: 2018-11-19

## 2018-11-19 RX ADMIN — DENOSUMAB 60 MG: 60 INJECTION SUBCUTANEOUS at 09:21

## 2019-01-15 DIAGNOSIS — I10 ESSENTIAL HYPERTENSION: ICD-10-CM

## 2019-01-15 RX ORDER — OMEPRAZOLE 20 MG/1
TABLET, DELAYED RELEASE ORAL
Qty: 30 TABLET | Refills: 5 | Status: SHIPPED | OUTPATIENT
Start: 2019-01-15 | End: 2019-01-15 | Stop reason: SDUPTHER

## 2019-01-15 RX ORDER — LISINOPRIL 5 MG/1
TABLET ORAL
Qty: 30 TABLET | Refills: 5 | Status: SHIPPED | OUTPATIENT
Start: 2019-01-15 | End: 2019-01-15 | Stop reason: SDUPTHER

## 2019-01-16 RX ORDER — LISINOPRIL 5 MG/1
TABLET ORAL
Qty: 30 TABLET | Refills: 5 | Status: SHIPPED | OUTPATIENT
Start: 2019-01-16 | End: 2019-06-17 | Stop reason: SDUPTHER

## 2019-01-16 RX ORDER — OMEPRAZOLE 20 MG/1
TABLET, DELAYED RELEASE ORAL
Qty: 30 TABLET | Refills: 5 | Status: SHIPPED | OUTPATIENT
Start: 2019-01-16 | End: 2019-06-17 | Stop reason: SDUPTHER

## 2019-01-25 ENCOUNTER — OFFICE VISIT (OUTPATIENT)
Dept: FAMILY MEDICINE CLINIC | Age: 59
End: 2019-01-25
Payer: COMMERCIAL

## 2019-01-25 VITALS
DIASTOLIC BLOOD PRESSURE: 80 MMHG | WEIGHT: 139 LBS | HEART RATE: 92 BPM | OXYGEN SATURATION: 97 % | SYSTOLIC BLOOD PRESSURE: 128 MMHG | TEMPERATURE: 98.2 F | BODY MASS INDEX: 27.15 KG/M2

## 2019-01-25 DIAGNOSIS — J98.8 RESPIRATORY INFECTION: ICD-10-CM

## 2019-01-25 DIAGNOSIS — M81.0 AGE-RELATED OSTEOPOROSIS WITHOUT CURRENT PATHOLOGICAL FRACTURE: ICD-10-CM

## 2019-01-25 DIAGNOSIS — M54.50 CHRONIC BILATERAL LOW BACK PAIN WITHOUT SCIATICA: ICD-10-CM

## 2019-01-25 DIAGNOSIS — I10 ESSENTIAL HYPERTENSION: Primary | ICD-10-CM

## 2019-01-25 DIAGNOSIS — G89.29 CHRONIC BILATERAL LOW BACK PAIN WITHOUT SCIATICA: ICD-10-CM

## 2019-01-25 PROCEDURE — G8484 FLU IMMUNIZE NO ADMIN: HCPCS | Performed by: FAMILY MEDICINE

## 2019-01-25 PROCEDURE — G8417 CALC BMI ABV UP PARAM F/U: HCPCS | Performed by: FAMILY MEDICINE

## 2019-01-25 PROCEDURE — 4004F PT TOBACCO SCREEN RCVD TLK: CPT | Performed by: FAMILY MEDICINE

## 2019-01-25 PROCEDURE — G8427 DOCREV CUR MEDS BY ELIG CLIN: HCPCS | Performed by: FAMILY MEDICINE

## 2019-01-25 PROCEDURE — 99214 OFFICE O/P EST MOD 30 MIN: CPT | Performed by: FAMILY MEDICINE

## 2019-01-25 PROCEDURE — 3014F SCREEN MAMMO DOC REV: CPT | Performed by: FAMILY MEDICINE

## 2019-01-25 PROCEDURE — 3017F COLORECTAL CA SCREEN DOC REV: CPT | Performed by: FAMILY MEDICINE

## 2019-01-25 RX ORDER — BENZONATATE 100 MG/1
100 CAPSULE ORAL 2 TIMES DAILY PRN
Qty: 20 CAPSULE | Refills: 0 | Status: SHIPPED | OUTPATIENT
Start: 2019-01-25 | End: 2019-02-01

## 2019-03-15 ASSESSMENT — ENCOUNTER SYMPTOMS
EYE DISCHARGE: 0
APNEA: 0
EYES NEGATIVE: 1
RHINORRHEA: 0
BACK PAIN: 0
ROS SKIN COMMENTS: DENIES BREAST SKIN CHANGES, ARM SWELLING, OR PALPABLE AXILLARY OR SUPRACLAVICULAR ADENOPATHY.
EYE ITCHING: 0
CONSTIPATION: 0
SINUS PAIN: 0
VOICE CHANGE: 0
TROUBLE SWALLOWING: 0
VOMITING: 0
COUGH: 0
WHEEZING: 0
BLOOD IN STOOL: 0
COLOR CHANGE: 0
ABDOMINAL PAIN: 0
CHOKING: 0
SORE THROAT: 0
SHORTNESS OF BREATH: 0
ABDOMINAL DISTENTION: 0
DIARRHEA: 0
EYE PAIN: 0
SINUS PRESSURE: 0
CHEST TIGHTNESS: 0
NAUSEA: 0

## 2019-03-27 ENCOUNTER — HOSPITAL ENCOUNTER (OUTPATIENT)
Dept: GENERAL RADIOLOGY | Age: 59
Discharge: HOME | End: 2019-03-29
Payer: COMMERCIAL

## 2019-03-27 ENCOUNTER — OFFICE VISIT (OUTPATIENT)
Dept: BREAST CENTER | Age: 59
End: 2019-03-27
Payer: COMMERCIAL

## 2019-03-27 VITALS
TEMPERATURE: 98.4 F | SYSTOLIC BLOOD PRESSURE: 128 MMHG | RESPIRATION RATE: 18 BRPM | OXYGEN SATURATION: 98 % | HEIGHT: 60 IN | BODY MASS INDEX: 25.91 KG/M2 | WEIGHT: 132 LBS | DIASTOLIC BLOOD PRESSURE: 82 MMHG | HEART RATE: 86 BPM

## 2019-03-27 DIAGNOSIS — Z85.3 PERSONAL HISTORY OF BREAST CANCER: ICD-10-CM

## 2019-03-27 DIAGNOSIS — Z17.0 MALIGNANT NEOPLASM OF NIPPLE OF RIGHT BREAST IN FEMALE, ESTROGEN RECEPTOR POSITIVE (HCC): ICD-10-CM

## 2019-03-27 DIAGNOSIS — Z78.0 POSTMENOPAUSAL: Primary | ICD-10-CM

## 2019-03-27 DIAGNOSIS — C50.011 MALIGNANT NEOPLASM OF NIPPLE OF RIGHT BREAST IN FEMALE, ESTROGEN RECEPTOR POSITIVE (HCC): ICD-10-CM

## 2019-03-27 DIAGNOSIS — M81.0 OSTEOPOROSIS WITHOUT CURRENT PATHOLOGICAL FRACTURE, UNSPECIFIED OSTEOPOROSIS TYPE: Primary | ICD-10-CM

## 2019-03-27 PROCEDURE — 3017F COLORECTAL CA SCREEN DOC REV: CPT | Performed by: NURSE PRACTITIONER

## 2019-03-27 PROCEDURE — G8484 FLU IMMUNIZE NO ADMIN: HCPCS | Performed by: NURSE PRACTITIONER

## 2019-03-27 PROCEDURE — 99214 OFFICE O/P EST MOD 30 MIN: CPT | Performed by: NURSE PRACTITIONER

## 2019-03-27 PROCEDURE — G8417 CALC BMI ABV UP PARAM F/U: HCPCS | Performed by: NURSE PRACTITIONER

## 2019-03-27 PROCEDURE — 3014F SCREEN MAMMO DOC REV: CPT | Performed by: NURSE PRACTITIONER

## 2019-03-27 PROCEDURE — 4004F PT TOBACCO SCREEN RCVD TLK: CPT | Performed by: NURSE PRACTITIONER

## 2019-03-27 PROCEDURE — 99213 OFFICE O/P EST LOW 20 MIN: CPT | Performed by: NURSE PRACTITIONER

## 2019-03-27 PROCEDURE — G8427 DOCREV CUR MEDS BY ELIG CLIN: HCPCS | Performed by: NURSE PRACTITIONER

## 2019-03-27 PROCEDURE — 77067 SCR MAMMO BI INCL CAD: CPT

## 2019-05-13 ENCOUNTER — HOSPITAL ENCOUNTER (OUTPATIENT)
Age: 59
Discharge: HOME | End: 2019-05-13
Payer: COMMERCIAL

## 2019-05-13 DIAGNOSIS — M81.0 AGE-RELATED OSTEOPOROSIS WITHOUT CURRENT PATHOLOGICAL FRACTURE: ICD-10-CM

## 2019-05-13 LAB
ANION GAP SERPL CALCULATED.3IONS-SCNC: 13 MMOL/L (ref 7–16)
BUN BLDV-MCNC: 18 MG/DL (ref 8–23)
CALCIUM SERPL-MCNC: 9.8 MG/DL (ref 8.6–10.2)
CHLORIDE BLD-SCNC: 99 MMOL/L (ref 98–107)
CO2: 25 MMOL/L (ref 22–29)
CREAT SERPL-MCNC: 1 MG/DL (ref 0.5–1)
GFR AFRICAN AMERICAN: >60
GFR NON-AFRICAN AMERICAN: 56 ML/MIN/1.73
GLUCOSE BLD-MCNC: 108 MG/DL (ref 74–99)
POTASSIUM SERPL-SCNC: 4.2 MMOL/L (ref 3.5–5)
SODIUM BLD-SCNC: 137 MMOL/L (ref 132–146)

## 2019-05-13 PROCEDURE — 80048 BASIC METABOLIC PNL TOTAL CA: CPT

## 2019-05-13 PROCEDURE — 36415 COLL VENOUS BLD VENIPUNCTURE: CPT

## 2019-05-20 ENCOUNTER — HOSPITAL ENCOUNTER (OUTPATIENT)
Dept: INFUSION THERAPY | Age: 59
Setting detail: INFUSION SERIES
Discharge: HOME | End: 2019-05-20
Payer: COMMERCIAL

## 2019-05-20 VITALS
DIASTOLIC BLOOD PRESSURE: 65 MMHG | RESPIRATION RATE: 16 BRPM | TEMPERATURE: 99 F | OXYGEN SATURATION: 97 % | SYSTOLIC BLOOD PRESSURE: 108 MMHG | WEIGHT: 132 LBS | HEART RATE: 94 BPM | BODY MASS INDEX: 25.78 KG/M2

## 2019-05-20 DIAGNOSIS — M81.0 OSTEOPOROSIS, UNSPECIFIED OSTEOPOROSIS TYPE, UNSPECIFIED PATHOLOGICAL FRACTURE PRESENCE: Primary | ICD-10-CM

## 2019-05-20 PROCEDURE — 96372 THER/PROPH/DIAG INJ SC/IM: CPT

## 2019-05-20 PROCEDURE — 6360000002 HC RX W HCPCS: Performed by: FAMILY MEDICINE

## 2019-05-20 RX ORDER — DIPHENHYDRAMINE HYDROCHLORIDE 50 MG/ML
50 INJECTION INTRAMUSCULAR; INTRAVENOUS ONCE
Status: CANCELLED
Start: 2019-11-18

## 2019-05-20 RX ORDER — EPINEPHRINE 1 MG/ML
0.3 INJECTION INTRAMUSCULAR; INTRAVENOUS; SUBCUTANEOUS EVERY 5 MIN PRN
Status: CANCELLED
Start: 2019-11-18

## 2019-05-20 RX ORDER — 0.9 % SODIUM CHLORIDE 0.9 %
10 VIAL (ML) INJECTION ONCE
Status: CANCELLED
Start: 2019-11-18

## 2019-05-20 RX ORDER — METHYLPREDNISOLONE SODIUM SUCCINATE 125 MG/2ML
125 INJECTION, POWDER, LYOPHILIZED, FOR SOLUTION INTRAMUSCULAR; INTRAVENOUS ONCE
Status: CANCELLED
Start: 2019-11-18

## 2019-05-20 RX ORDER — SODIUM CHLORIDE 9 MG/ML
INJECTION, SOLUTION INTRAVENOUS CONTINUOUS
Status: CANCELLED
Start: 2019-11-18

## 2019-05-20 RX ADMIN — DENOSUMAB 60 MG: 60 INJECTION SUBCUTANEOUS at 09:36

## 2019-06-17 DIAGNOSIS — I10 ESSENTIAL HYPERTENSION: ICD-10-CM

## 2019-06-17 RX ORDER — OMEPRAZOLE 20 MG/1
TABLET, DELAYED RELEASE ORAL
Qty: 30 TABLET | Refills: 5 | Status: SHIPPED | OUTPATIENT
Start: 2019-06-17 | End: 2020-01-10 | Stop reason: SDUPTHER

## 2019-06-17 RX ORDER — LISINOPRIL 5 MG/1
TABLET ORAL
Qty: 30 TABLET | Refills: 5 | Status: SHIPPED | OUTPATIENT
Start: 2019-06-17 | End: 2020-01-10 | Stop reason: SDUPTHER

## 2019-06-24 ENCOUNTER — OFFICE VISIT (OUTPATIENT)
Dept: FAMILY MEDICINE CLINIC | Age: 59
End: 2019-06-24
Payer: COMMERCIAL

## 2019-06-24 VITALS
OXYGEN SATURATION: 98 % | DIASTOLIC BLOOD PRESSURE: 84 MMHG | BODY MASS INDEX: 27.88 KG/M2 | HEART RATE: 98 BPM | TEMPERATURE: 97.8 F | HEIGHT: 60 IN | SYSTOLIC BLOOD PRESSURE: 128 MMHG | WEIGHT: 142 LBS

## 2019-06-24 DIAGNOSIS — R21 RASH AND NONSPECIFIC SKIN ERUPTION: Primary | ICD-10-CM

## 2019-06-24 DIAGNOSIS — H61.21 IMPACTED CERUMEN OF RIGHT EAR: ICD-10-CM

## 2019-06-24 PROCEDURE — 99214 OFFICE O/P EST MOD 30 MIN: CPT | Performed by: PHYSICIAN ASSISTANT

## 2019-06-24 PROCEDURE — 69210 REMOVE IMPACTED EAR WAX UNI: CPT | Performed by: PHYSICIAN ASSISTANT

## 2019-06-24 RX ORDER — CLOTRIMAZOLE AND BETAMETHASONE DIPROPIONATE 10; .64 MG/G; MG/G
CREAM TOPICAL
Qty: 15 G | Refills: 1 | Status: SHIPPED
Start: 2019-06-24 | End: 2020-02-14

## 2019-06-24 NOTE — PROGRESS NOTES
19  Irene Fitzgerald : 1960 Sex: female  Age 62 y.o. Subjective:  Chief Complaint   Patient presents with    Rash     legs x 1 month          HPI:   Irene Fitzgerald , 62 y.o. female presents to express care for evaluation of rash on her bilateral lower extremities. The patient is also wanting to be evaluated for right ear pain and discomfort with decreased hearing. The patient has had this rash on her lower extremities for the last 1 month. The patient was started on ketoconazole by her oncologist.  The patient states that she really does not note the rash to be itchy pain, or burning. The patient is on any back pain or flank pain. No new soaps, detergents, lotions. No new razors. The patient is not having any rash to the upper torso or any other parts of the body. The patient states that she is really not had any significant improvement with the ketoconazole. The patient would also like evaluated for decreased hearing and right ear pain. The patient states she cannot really hear out of the right ear. The patient does not have any drainage or discharge noted. The patient that of a sore throat. No upper respiratory symptoms. No traumatic injury. The patient denies any left ear pain. ROS:   Unless otherwise stated in this report the patient's positive and negative responses for review of systems for constitutional, eyes, ENT, cardiovascular, respiratory, gastrointestinal, neurological, , musculoskeletal, and integument systems and related systems to the presenting problem are either stated in the history of present illness or were not pertinent or were negative for the symptoms and/or complaints related to the presenting medical problem. Positives and pertinent negatives as per HPI. All others reviewed and are negative.       PMH:     Past Medical History:   Diagnosis Date    Anxiety     Arthritis     Cancer ([facility] [address]     breast right    Encounter for screening colonoscopy     GERD (gastroesophageal reflux disease)     Headache     Hyperlipidemia     Hypertension     Osteoporosis     PONV (postoperative nausea and vomiting)        Past Surgical History:   Procedure Laterality Date    APPENDECTOMY      CARDIOVASCULAR STRESS TEST      CHOLECYSTECTOMY  1984    COLONOSCOPY  09/17/2018    COLONOSCOPY N/A 9/17/2018    COLONOSCOPY POLYPECTOMY SNARE/COLD BIOPSY performed by Melchor Dunlap MD at [address] [place] [address], COLON, DIAGNOSTIC      HERNIA REPAIR  2009,2010    HYSTERECTOMY  2009    MASTECTOMY Right 2015    simple, blue dye, with right axillary sentinel lymph node excision    UPPER GASTROINTESTINAL ENDOSCOPY  04/03/2017     Medications:     Current Outpatient Medications:     clotrimazole-betamethasone (LOTRISONE) 1-0.05 % cream, Apply topically 2 times daily. , Disp: 15 g, Rfl: 1    omeprazole (PRILOSEC OTC) 20 MG tablet, TAKE ONE TABLET BY MOUTH ONCE DAILY WITH BREAKFAST, Disp: 30 tablet, Rfl: 5    lisinopril (PRINIVIL;ZESTRIL) 5 MG tablet, TAKE ONE TABLET BY MOUTH DAILY, Disp: 30 tablet, Rfl: 5    ibuprofen (ADVIL;MOTRIN) 200 MG tablet, Take 200 mg by mouth every 6 hours as needed for Pain, Disp: , Rfl:     fluticasone (FLONASE) 50 MCG/ACT nasal spray, 2 sprays by Nasal route daily (Patient taking differently: 2 sprays by Nasal route daily as needed for Rhinitis or Allergies ), Disp: 1 Bottle, Rfl: 3    ondansetron (ZOFRAN ODT) 4 MG disintegrating tablet, Take 1 tablet by mouth every 8 hours as needed for Nausea or Vomiting (Nausea or Dry heaves), Disp: 15 tablet, Rfl: 0    Calcium Carbonate-Vit D-Min (CALCIUM 1200 PO), Take 1 tablet by mouth 2 times daily Last dose 9/11/2018, Disp: , Rfl:     anastrozole (ARIMIDEX) 1 MG tablet, Take 1 mg by mouth daily, Disp: , Rfl:     triamcinolone (KENALOG) 0.1 % ointment, Apply topically 2 times daily as needed Apply topically 2 times daily. , Disp: , Rfl:     Allergies:      Allergies   Allergen Reactions    Codeine Nausea And Vomiting       Social History:     Social History     Tobacco Use    Smoking status: Current Every Day Smoker     Packs/day: 0.50     Years: 41.00     Pack years: 20.50     Types: Cigarettes     Last attempt to quit: 2/14/2016     Years since quitting: 3.3    Smokeless tobacco: Never Used   Substance Use Topics    Alcohol use: No     Alcohol/week: 0.0 oz    Drug use: No       Physical Exam:     Vitals:    06/24/19 1038   BP: 128/84   Pulse: 98   Temp: 97.8 °F (36.6 °C)   TempSrc: Temporal   SpO2: 98%   Weight: 142 lb (64.4 kg)   Height: 5' (1.524 m)       Exam:  Physical Exam  Vital Signs and nurse's notes are reviewed. The patient is not hypoxic. General: Alert, no acute distress, patient resting comfortably  Skin: warm, intact, no pallor noted. The patient has evidence of a one area to the right anterior thigh has an erythematous raised area with a central clearing that seems to be consistent with a tinea corporis that the other areas seem to be maculopapular and there is a 3 or 4 on either thigh, there is no evidence of laceration or excoriations. the patient has no evidence of petechiae, purpura, or vesicles. The patient is no sloughing of the skin. Rash does lloyd. The patient has no involvement of the palms, soles, or oral mucosa. The patient has no significant sloughing of the skin associated. Head: Normocephalic, atraumatic  Eye: Normal conjunctiva  Ears, nose, throat: moist mucous membranes, there is no involvement of the oral mucosa, there is no lip or tongue swelling. The patient has airway patent. The patient has no tonsillar hypertrophy or swelling to the posterior oropharynx. Neck: The patient has no masses, warmth, or erythema. The patient has no meningeal signs. The patient has no nuchal rigidity noted.   Cardiovascular: Regular Rate and Rhythm  Respiratory: No acute distress, no tachypnea, no evidence of rhonchi, wheezing, or rales noted in bilateral lung

## 2019-08-12 ENCOUNTER — HOSPITAL ENCOUNTER (OUTPATIENT)
Age: 59
Discharge: HOME | End: 2019-08-14
Payer: COMMERCIAL

## 2019-08-12 ENCOUNTER — OFFICE VISIT (OUTPATIENT)
Dept: FAMILY MEDICINE CLINIC | Age: 59
End: 2019-08-12
Payer: COMMERCIAL

## 2019-08-12 ENCOUNTER — TELEPHONE (OUTPATIENT)
Dept: FAMILY MEDICINE CLINIC | Age: 59
End: 2019-08-12

## 2019-08-12 VITALS
SYSTOLIC BLOOD PRESSURE: 126 MMHG | HEIGHT: 60 IN | BODY MASS INDEX: 26.11 KG/M2 | TEMPERATURE: 98.8 F | DIASTOLIC BLOOD PRESSURE: 88 MMHG | OXYGEN SATURATION: 98 % | HEART RATE: 84 BPM | WEIGHT: 133 LBS

## 2019-08-12 DIAGNOSIS — M81.0 AGE-RELATED OSTEOPOROSIS WITHOUT CURRENT PATHOLOGICAL FRACTURE: ICD-10-CM

## 2019-08-12 DIAGNOSIS — M81.0 AGE-RELATED OSTEOPOROSIS WITHOUT CURRENT PATHOLOGICAL FRACTURE: Primary | ICD-10-CM

## 2019-08-12 DIAGNOSIS — F41.9 ANXIETY AND DEPRESSION: Primary | ICD-10-CM

## 2019-08-12 DIAGNOSIS — Z13.31 POSITIVE DEPRESSION SCREENING: ICD-10-CM

## 2019-08-12 DIAGNOSIS — F32.A ANXIETY AND DEPRESSION: Primary | ICD-10-CM

## 2019-08-12 DIAGNOSIS — R21 RASH AND NONSPECIFIC SKIN ERUPTION: ICD-10-CM

## 2019-08-12 DIAGNOSIS — I10 ESSENTIAL HYPERTENSION: ICD-10-CM

## 2019-08-12 PROCEDURE — 99214 OFFICE O/P EST MOD 30 MIN: CPT | Performed by: FAMILY MEDICINE

## 2019-08-12 PROCEDURE — G8417 CALC BMI ABV UP PARAM F/U: HCPCS | Performed by: FAMILY MEDICINE

## 2019-08-12 PROCEDURE — 36415 COLL VENOUS BLD VENIPUNCTURE: CPT

## 2019-08-12 PROCEDURE — 80048 BASIC METABOLIC PNL TOTAL CA: CPT

## 2019-08-12 PROCEDURE — 3017F COLORECTAL CA SCREEN DOC REV: CPT | Performed by: FAMILY MEDICINE

## 2019-08-12 PROCEDURE — 4004F PT TOBACCO SCREEN RCVD TLK: CPT | Performed by: FAMILY MEDICINE

## 2019-08-12 PROCEDURE — G8431 POS CLIN DEPRES SCRN F/U DOC: HCPCS | Performed by: FAMILY MEDICINE

## 2019-08-12 PROCEDURE — 96160 PT-FOCUSED HLTH RISK ASSMT: CPT | Performed by: FAMILY MEDICINE

## 2019-08-12 PROCEDURE — 3014F SCREEN MAMMO DOC REV: CPT | Performed by: FAMILY MEDICINE

## 2019-08-12 PROCEDURE — G8427 DOCREV CUR MEDS BY ELIG CLIN: HCPCS | Performed by: FAMILY MEDICINE

## 2019-08-12 RX ORDER — FLUOXETINE 20 MG/1
20 TABLET, FILM COATED ORAL DAILY
Qty: 30 TABLET | Refills: 3 | Status: SHIPPED | OUTPATIENT
Start: 2019-08-12 | End: 2019-08-26

## 2019-08-12 ASSESSMENT — PATIENT HEALTH QUESTIONNAIRE - PHQ9
1. LITTLE INTEREST OR PLEASURE IN DOING THINGS: 3
SUM OF ALL RESPONSES TO PHQ QUESTIONS 1-9: 15
SUM OF ALL RESPONSES TO PHQ9 QUESTIONS 1 & 2: 6
3. TROUBLE FALLING OR STAYING ASLEEP: 3
10. IF YOU CHECKED OFF ANY PROBLEMS, HOW DIFFICULT HAVE THESE PROBLEMS MADE IT FOR YOU TO DO YOUR WORK, TAKE CARE OF THINGS AT HOME, OR GET ALONG WITH OTHER PEOPLE: 1
4. FEELING TIRED OR HAVING LITTLE ENERGY: 3
6. FEELING BAD ABOUT YOURSELF - OR THAT YOU ARE A FAILURE OR HAVE LET YOURSELF OR YOUR FAMILY DOWN: 0
SUM OF ALL RESPONSES TO PHQ QUESTIONS 1-9: 15
7. TROUBLE CONCENTRATING ON THINGS, SUCH AS READING THE NEWSPAPER OR WATCHING TELEVISION: 0
8. MOVING OR SPEAKING SO SLOWLY THAT OTHER PEOPLE COULD HAVE NOTICED. OR THE OPPOSITE, BEING SO FIGETY OR RESTLESS THAT YOU HAVE BEEN MOVING AROUND A LOT MORE THAN USUAL: 0
9. THOUGHTS THAT YOU WOULD BE BETTER OFF DEAD, OR OF HURTING YOURSELF: 0
2. FEELING DOWN, DEPRESSED OR HOPELESS: 3
5. POOR APPETITE OR OVEREATING: 3

## 2019-08-12 NOTE — PROGRESS NOTES
[facility]  Office Progress Note - Dr. Diana Varner  8/12/19    CC:   Chief Complaint   Patient presents with    Hypertension    Rash     rash on legs        S:   Hypertension  Follow-up  Blood pressure is controlled today. BP Readings from Last 3 Encounters:   08/12/19 126/88   06/24/19 128/84   05/20/19 108/65     Patient continues medications regularly. Compliance is good. Denies CP, sob, abd pain, headaches, vision changes, dizziness, hypotensive symptoms. No side effects from medications noted. Rash on lower extremities. Urgent care note from June 24 reviewed. This was treated with clotrimazole/betamethasone cream.  Patient reports that her rash has not really improved. Prior to that treatment it was treated with ketoconazole. She does have a few scattered macular  / vascular lesions on BL thighs, that do lloyd with pressure. .   Not really itchy or bothersome - she is just worried about why or what it is. Does not seem to be progressing over time. Is not present in other locations. Osteoporosis  Patient has had her second Prolia injection earlier this spring. She did not have improvement with oral bisphosphonate therapy. She has not had side effects with Prolia injections so far. Depression/anxiety. Patient again tearful today when talking about her stresses. Does not feel like she really has a life goal right now. She is increasingly stressed with babysitting her grandchildren, often for 11 hours daily. She has discussed this with her daughter. We discussed for main treatment options. This included doing nothing and continuing with current symptoms, trying to change some of the situational problems, which she has made some progress with recently, trying some counseling, and trying medication. .  No SI or HI. Depression screening was positive, measured at 15 today.   Past Medical History:   Diagnosis Date    Anxiety     Arthritis     Cancer [facility])     breast

## 2019-08-13 DIAGNOSIS — E87.0 HYPERNATREMIA: Primary | ICD-10-CM

## 2019-08-13 NOTE — TELEPHONE ENCOUNTER
Notified patient. Patient verbalized understanding. Left message with lab supervisor Christopher Aguilar to cancel lab charge.

## 2019-08-14 LAB
ANION GAP SERPL CALCULATED.3IONS-SCNC: ABNORMAL MMOL/L (ref 7–16)
BUN BLDV-MCNC: ABNORMAL MG/DL (ref 8–23)
CALCIUM SERPL-MCNC: ABNORMAL MG/DL (ref 8.6–10.2)
CHLORIDE BLD-SCNC: ABNORMAL MMOL/L (ref 98–107)
CO2: ABNORMAL MMOL/L (ref 22–29)
CREAT SERPL-MCNC: ABNORMAL MG/DL (ref 0.5–1)
GFR AFRICAN AMERICAN: ABNORMAL
GFR NON-AFRICAN AMERICAN: ABNORMAL ML/MIN/1.73
GLUCOSE BLD-MCNC: ABNORMAL MG/DL (ref 74–99)
POTASSIUM SERPL-SCNC: ABNORMAL MMOL/L (ref 3.5–5)
SODIUM BLD-SCNC: ABNORMAL MMOL/L (ref 132–146)

## 2019-08-15 ENCOUNTER — HOSPITAL ENCOUNTER (OUTPATIENT)
Age: 59
Discharge: HOME | End: 2019-08-17
Payer: COMMERCIAL

## 2019-08-15 DIAGNOSIS — M81.0 AGE-RELATED OSTEOPOROSIS WITHOUT CURRENT PATHOLOGICAL FRACTURE: ICD-10-CM

## 2019-08-15 DIAGNOSIS — E87.0 HYPERNATREMIA: ICD-10-CM

## 2019-08-15 LAB
ANION GAP SERPL CALCULATED.3IONS-SCNC: 16 MMOL/L (ref 7–16)
BUN BLDV-MCNC: 18 MG/DL (ref 8–23)
CALCIUM SERPL-MCNC: 9.4 MG/DL (ref 8.6–10.2)
CHLORIDE BLD-SCNC: 101 MMOL/L (ref 98–107)
CO2: 21 MMOL/L (ref 22–29)
CREAT SERPL-MCNC: 1 MG/DL (ref 0.5–1)
GFR AFRICAN AMERICAN: >60
GFR NON-AFRICAN AMERICAN: 56 ML/MIN/1.73
GLUCOSE BLD-MCNC: 101 MG/DL (ref 74–99)
POTASSIUM SERPL-SCNC: 4.8 MMOL/L (ref 3.5–5)
SODIUM BLD-SCNC: 138 MMOL/L (ref 132–146)

## 2019-08-15 PROCEDURE — 36415 COLL VENOUS BLD VENIPUNCTURE: CPT

## 2019-08-15 PROCEDURE — 80048 BASIC METABOLIC PNL TOTAL CA: CPT

## 2019-08-16 ENCOUNTER — TELEPHONE (OUTPATIENT)
Dept: FAMILY MEDICINE CLINIC | Age: 59
End: 2019-08-16

## 2019-08-16 NOTE — TELEPHONE ENCOUNTER
Glad that it seems to be helping already with mood. That is a pretty quick response. The diarrhea is probably a side effect. She will have to decide if she is willing to put up with that side effect and see if it gets better, since it is helping with mood. Ultimately we could change the prescription though.

## 2019-08-16 NOTE — TELEPHONE ENCOUNTER
Notified patient. Patient verbalized understanding. Pt will see how she does and see if diarrhea subsides.

## 2019-09-13 ENCOUNTER — OFFICE VISIT (OUTPATIENT)
Dept: FAMILY MEDICINE CLINIC | Age: 59
End: 2019-09-13
Payer: COMMERCIAL

## 2019-09-13 ENCOUNTER — TELEPHONE (OUTPATIENT)
Dept: FAMILY MEDICINE CLINIC | Age: 59
End: 2019-09-13

## 2019-09-13 VITALS
SYSTOLIC BLOOD PRESSURE: 128 MMHG | HEART RATE: 78 BPM | BODY MASS INDEX: 26.11 KG/M2 | TEMPERATURE: 97.8 F | DIASTOLIC BLOOD PRESSURE: 80 MMHG | OXYGEN SATURATION: 97 % | WEIGHT: 133 LBS | HEIGHT: 60 IN

## 2019-09-13 DIAGNOSIS — J98.8 RESPIRATORY INFECTION: ICD-10-CM

## 2019-09-13 DIAGNOSIS — F32.A ANXIETY AND DEPRESSION: Primary | ICD-10-CM

## 2019-09-13 DIAGNOSIS — F41.9 ANXIETY AND DEPRESSION: Primary | ICD-10-CM

## 2019-09-13 PROCEDURE — 99213 OFFICE O/P EST LOW 20 MIN: CPT | Performed by: FAMILY MEDICINE

## 2019-09-13 PROCEDURE — 3014F SCREEN MAMMO DOC REV: CPT | Performed by: FAMILY MEDICINE

## 2019-09-13 PROCEDURE — G8427 DOCREV CUR MEDS BY ELIG CLIN: HCPCS | Performed by: FAMILY MEDICINE

## 2019-09-13 PROCEDURE — G8417 CALC BMI ABV UP PARAM F/U: HCPCS | Performed by: FAMILY MEDICINE

## 2019-09-13 PROCEDURE — 3017F COLORECTAL CA SCREEN DOC REV: CPT | Performed by: FAMILY MEDICINE

## 2019-09-13 PROCEDURE — 4004F PT TOBACCO SCREEN RCVD TLK: CPT | Performed by: FAMILY MEDICINE

## 2019-09-13 RX ORDER — AZITHROMYCIN 250 MG/1
250 TABLET, FILM COATED ORAL SEE ADMIN INSTRUCTIONS
Qty: 6 TABLET | Refills: 0 | Status: SHIPPED | OUTPATIENT
Start: 2019-09-13 | End: 2019-09-18

## 2019-09-13 RX ORDER — CYCLOSPORINE 0.5 MG/ML
EMULSION OPHTHALMIC
Refills: 0 | COMMUNITY
Start: 2019-06-24 | End: 2021-04-26

## 2019-09-13 RX ORDER — BROMPHENIRAMINE MALEATE, PSEUDOEPHEDRINE HYDROCHLORIDE, AND DEXTROMETHORPHAN HYDROBROMIDE 2; 30; 10 MG/5ML; MG/5ML; MG/5ML
5 SYRUP ORAL 4 TIMES DAILY PRN
Qty: 240 ML | Refills: 1 | Status: SHIPPED | OUTPATIENT
Start: 2019-09-13 | End: 2019-10-13

## 2019-09-13 RX ORDER — HYDROXYZINE PAMOATE 25 MG/1
25 CAPSULE ORAL 2 TIMES DAILY
Qty: 60 CAPSULE | Refills: 1 | Status: SHIPPED
Start: 2019-09-13 | End: 2021-04-26

## 2019-09-13 NOTE — TELEPHONE ENCOUNTER
Reymundo from [facility] called- the hydroxyzine liz 25 mg is on back order- can they switch it to hydroxyzine HCL? If that is ok please send a new script to [facility] or change to something else.

## 2019-09-16 RX ORDER — HYDROXYZINE HYDROCHLORIDE 25 MG/1
25 TABLET, FILM COATED ORAL 3 TIMES DAILY PRN
Qty: 90 TABLET | Refills: 0 | Status: SHIPPED | OUTPATIENT
Start: 2019-09-16 | End: 2019-10-24 | Stop reason: ALTCHOICE

## 2019-10-24 ENCOUNTER — OFFICE VISIT (OUTPATIENT)
Dept: FAMILY MEDICINE CLINIC | Age: 59
End: 2019-10-24
Payer: COMMERCIAL

## 2019-10-24 VITALS
BODY MASS INDEX: 26.7 KG/M2 | OXYGEN SATURATION: 95 % | HEIGHT: 60 IN | HEART RATE: 76 BPM | DIASTOLIC BLOOD PRESSURE: 80 MMHG | TEMPERATURE: 97.8 F | WEIGHT: 136 LBS | SYSTOLIC BLOOD PRESSURE: 138 MMHG

## 2019-10-24 DIAGNOSIS — I10 ESSENTIAL HYPERTENSION: ICD-10-CM

## 2019-10-24 DIAGNOSIS — F41.9 ANXIETY: Primary | ICD-10-CM

## 2019-10-24 DIAGNOSIS — R11.10 DRY HEAVES: ICD-10-CM

## 2019-10-24 PROCEDURE — G9899 SCRN MAM PERF RSLTS DOC: HCPCS | Performed by: FAMILY MEDICINE

## 2019-10-24 PROCEDURE — G8482 FLU IMMUNIZE ORDER/ADMIN: HCPCS | Performed by: FAMILY MEDICINE

## 2019-10-24 PROCEDURE — 90686 IIV4 VACC NO PRSV 0.5 ML IM: CPT | Performed by: FAMILY MEDICINE

## 2019-10-24 PROCEDURE — 3017F COLORECTAL CA SCREEN DOC REV: CPT | Performed by: FAMILY MEDICINE

## 2019-10-24 PROCEDURE — 90471 IMMUNIZATION ADMIN: CPT | Performed by: FAMILY MEDICINE

## 2019-10-24 PROCEDURE — G8427 DOCREV CUR MEDS BY ELIG CLIN: HCPCS | Performed by: FAMILY MEDICINE

## 2019-10-24 PROCEDURE — 99213 OFFICE O/P EST LOW 20 MIN: CPT | Performed by: FAMILY MEDICINE

## 2019-10-24 PROCEDURE — G8417 CALC BMI ABV UP PARAM F/U: HCPCS | Performed by: FAMILY MEDICINE

## 2019-10-24 PROCEDURE — 4004F PT TOBACCO SCREEN RCVD TLK: CPT | Performed by: FAMILY MEDICINE

## 2019-10-24 RX ORDER — ONDANSETRON 4 MG/1
4 TABLET, ORALLY DISINTEGRATING ORAL EVERY 8 HOURS PRN
Qty: 15 TABLET | Refills: 0 | Status: SHIPPED
Start: 2019-10-24 | End: 2021-11-15

## 2019-10-25 ENCOUNTER — HOSPITAL ENCOUNTER (OUTPATIENT)
Age: 59
Discharge: HOME | End: 2019-10-27
Payer: COMMERCIAL

## 2019-10-25 DIAGNOSIS — I10 ESSENTIAL HYPERTENSION: ICD-10-CM

## 2019-10-25 LAB
ALBUMIN SERPL-MCNC: 4.2 G/DL (ref 3.5–5.2)
ALP BLD-CCNC: 67 U/L (ref 35–104)
ALT SERPL-CCNC: 13 U/L (ref 0–32)
ANION GAP SERPL CALCULATED.3IONS-SCNC: 15 MMOL/L (ref 7–16)
AST SERPL-CCNC: 11 U/L (ref 0–31)
BASOPHILS ABSOLUTE: 0.05 E9/L (ref 0–0.2)
BASOPHILS RELATIVE PERCENT: 0.6 % (ref 0–2)
BILIRUB SERPL-MCNC: <0.2 MG/DL (ref 0–1.2)
BUN BLDV-MCNC: 15 MG/DL (ref 8–23)
CALCIUM SERPL-MCNC: 9.6 MG/DL (ref 8.6–10.2)
CHLORIDE BLD-SCNC: 99 MMOL/L (ref 98–107)
CHOLESTEROL, TOTAL: 192 MG/DL (ref 0–199)
CO2: 23 MMOL/L (ref 22–29)
CREAT SERPL-MCNC: 1 MG/DL (ref 0.5–1)
EOSINOPHILS ABSOLUTE: 0.15 E9/L (ref 0.05–0.5)
EOSINOPHILS RELATIVE PERCENT: 1.7 % (ref 0–6)
GFR AFRICAN AMERICAN: >60
GFR NON-AFRICAN AMERICAN: 56 ML/MIN/1.73
GLUCOSE BLD-MCNC: 107 MG/DL (ref 74–99)
HCT VFR BLD CALC: 39.4 % (ref 34–48)
HDLC SERPL-MCNC: 29 MG/DL
HEMOGLOBIN: 11.8 G/DL (ref 11.5–15.5)
IMMATURE GRANULOCYTES #: 0.04 E9/L
IMMATURE GRANULOCYTES %: 0.5 % (ref 0–5)
LDL CHOLESTEROL CALCULATED: 107 MG/DL (ref 0–99)
LYMPHOCYTES ABSOLUTE: 1.49 E9/L (ref 1.5–4)
LYMPHOCYTES RELATIVE PERCENT: 16.9 % (ref 20–42)
MCH RBC QN AUTO: 24.8 PG (ref 26–35)
MCHC RBC AUTO-ENTMCNC: 29.9 % (ref 32–34.5)
MCV RBC AUTO: 82.9 FL (ref 80–99.9)
MONOCYTES ABSOLUTE: 0.61 E9/L (ref 0.1–0.95)
MONOCYTES RELATIVE PERCENT: 6.9 % (ref 2–12)
NEUTROPHILS ABSOLUTE: 6.5 E9/L (ref 1.8–7.3)
NEUTROPHILS RELATIVE PERCENT: 73.4 % (ref 43–80)
PDW BLD-RTO: 17.5 FL (ref 11.5–15)
PLATELET # BLD: 405 E9/L (ref 130–450)
PMV BLD AUTO: 9.6 FL (ref 7–12)
POTASSIUM SERPL-SCNC: 4.1 MMOL/L (ref 3.5–5)
RBC # BLD: 4.75 E12/L (ref 3.5–5.5)
SODIUM BLD-SCNC: 137 MMOL/L (ref 132–146)
TOTAL PROTEIN: 8.2 G/DL (ref 6.4–8.3)
TRIGL SERPL-MCNC: 281 MG/DL (ref 0–149)
VLDLC SERPL CALC-MCNC: 56 MG/DL
WBC # BLD: 8.8 E9/L (ref 4.5–11.5)

## 2019-10-25 PROCEDURE — 80053 COMPREHEN METABOLIC PANEL: CPT

## 2019-10-25 PROCEDURE — 80061 LIPID PANEL: CPT

## 2019-10-25 PROCEDURE — 85025 COMPLETE CBC W/AUTO DIFF WBC: CPT

## 2019-10-25 PROCEDURE — 36415 COLL VENOUS BLD VENIPUNCTURE: CPT

## 2019-10-29 RX ORDER — DIPHENHYDRAMINE HYDROCHLORIDE 50 MG/ML
50 INJECTION INTRAMUSCULAR; INTRAVENOUS ONCE
Status: CANCELLED | OUTPATIENT
Start: 2019-10-29

## 2019-10-29 RX ORDER — SODIUM CHLORIDE 9 MG/ML
INJECTION, SOLUTION INTRAVENOUS CONTINUOUS
Status: CANCELLED | OUTPATIENT
Start: 2019-10-29

## 2019-10-29 RX ORDER — EPINEPHRINE 1 MG/ML
0.3 INJECTION, SOLUTION, CONCENTRATE INTRAVENOUS PRN
Status: CANCELLED | OUTPATIENT
Start: 2019-10-29

## 2019-10-29 RX ORDER — METHYLPREDNISOLONE SODIUM SUCCINATE 125 MG/2ML
125 INJECTION, POWDER, LYOPHILIZED, FOR SOLUTION INTRAMUSCULAR; INTRAVENOUS ONCE
Status: CANCELLED | OUTPATIENT
Start: 2019-10-29

## 2019-11-18 ENCOUNTER — HOSPITAL ENCOUNTER (OUTPATIENT)
Dept: INFUSION THERAPY | Age: 59
Setting detail: INFUSION SERIES
Discharge: HOME | End: 2019-11-18
Payer: COMMERCIAL

## 2019-11-18 VITALS
HEART RATE: 87 BPM | WEIGHT: 136 LBS | DIASTOLIC BLOOD PRESSURE: 63 MMHG | BODY MASS INDEX: 26.56 KG/M2 | SYSTOLIC BLOOD PRESSURE: 135 MMHG | RESPIRATION RATE: 16 BRPM | TEMPERATURE: 98.1 F | OXYGEN SATURATION: 98 %

## 2019-11-18 DIAGNOSIS — M81.0 OSTEOPOROSIS, UNSPECIFIED OSTEOPOROSIS TYPE, UNSPECIFIED PATHOLOGICAL FRACTURE PRESENCE: Primary | ICD-10-CM

## 2019-11-18 PROCEDURE — 96372 THER/PROPH/DIAG INJ SC/IM: CPT

## 2019-11-18 PROCEDURE — 6360000002 HC RX W HCPCS: Performed by: FAMILY MEDICINE

## 2019-11-18 RX ORDER — DIPHENHYDRAMINE HYDROCHLORIDE 50 MG/ML
50 INJECTION INTRAMUSCULAR; INTRAVENOUS ONCE
Status: CANCELLED | OUTPATIENT
Start: 2020-05-18

## 2019-11-18 RX ORDER — SODIUM CHLORIDE 9 MG/ML
INJECTION, SOLUTION INTRAVENOUS CONTINUOUS
Status: CANCELLED | OUTPATIENT
Start: 2020-05-18

## 2019-11-18 RX ORDER — METHYLPREDNISOLONE SODIUM SUCCINATE 125 MG/2ML
125 INJECTION, POWDER, LYOPHILIZED, FOR SOLUTION INTRAMUSCULAR; INTRAVENOUS ONCE
Status: CANCELLED | OUTPATIENT
Start: 2020-05-18

## 2019-11-18 RX ORDER — EPINEPHRINE 1 MG/ML
0.3 INJECTION, SOLUTION, CONCENTRATE INTRAVENOUS PRN
Status: CANCELLED | OUTPATIENT
Start: 2020-05-18

## 2019-11-18 RX ADMIN — DENOSUMAB 60 MG: 60 INJECTION SUBCUTANEOUS at 09:05

## 2019-11-18 NOTE — PROGRESS NOTES
Patient tolerated prolia  injection well. Therapy plan reviewed with patient. Verbalizes understanding. Reviewed AVS with patient, reviewed medication information, verbalizes good knowledge of current plan, and has no signs or symptoms to report at this time. Declines copy of AVS.  Next appointment made and patient instructed on lab draw and procedure for next injection.

## 2020-01-10 RX ORDER — LISINOPRIL 5 MG/1
TABLET ORAL
Qty: 30 TABLET | Refills: 5 | Status: SHIPPED
Start: 2020-01-10 | End: 2020-07-06

## 2020-01-10 RX ORDER — OMEPRAZOLE 20 MG/1
TABLET, DELAYED RELEASE ORAL
Qty: 30 TABLET | Refills: 5 | Status: SHIPPED
Start: 2020-01-10 | End: 2020-02-14

## 2020-01-10 NOTE — TELEPHONE ENCOUNTER
Last Appointment:  10/24/2019  Future Appointments   Date Time Provider Tonio Jimenes   4/1/2020  9:15 AM [facility] DEXLISETTE BULL [facility]   4/1/2020 10:00 AM Gayathri Sen, APRN - CNP [facility]   4/20/2020  8:20 AM MD PETER Malone [facility]   5/18/2020  9:00 AM [facility]

## 2020-01-15 ENCOUNTER — PATIENT MESSAGE (OUTPATIENT)
Dept: FAMILY MEDICINE CLINIC | Age: 60
End: 2020-01-15

## 2020-01-16 RX ORDER — LIDOCAINE HYDROCHLORIDE 20 MG/ML
5 SOLUTION OROPHARYNGEAL
Qty: 100 ML | Refills: 0 | Status: SHIPPED
Start: 2020-01-16 | End: 2020-02-14

## 2020-02-14 ENCOUNTER — OFFICE VISIT (OUTPATIENT)
Dept: FAMILY MEDICINE CLINIC | Age: 60
End: 2020-02-14
Payer: COMMERCIAL

## 2020-02-14 VITALS
OXYGEN SATURATION: 97 % | HEART RATE: 101 BPM | TEMPERATURE: 98.2 F | BODY MASS INDEX: 27.29 KG/M2 | HEIGHT: 60 IN | SYSTOLIC BLOOD PRESSURE: 136 MMHG | DIASTOLIC BLOOD PRESSURE: 72 MMHG | WEIGHT: 139 LBS

## 2020-02-14 LAB
INFLUENZA A ANTIBODY: NORMAL
INFLUENZA B ANTIBODY: NORMAL
S PYO AG THROAT QL: NORMAL

## 2020-02-14 PROCEDURE — 87804 INFLUENZA ASSAY W/OPTIC: CPT | Performed by: FAMILY MEDICINE

## 2020-02-14 PROCEDURE — G8417 CALC BMI ABV UP PARAM F/U: HCPCS | Performed by: FAMILY MEDICINE

## 2020-02-14 PROCEDURE — 1036F TOBACCO NON-USER: CPT | Performed by: FAMILY MEDICINE

## 2020-02-14 PROCEDURE — 87880 STREP A ASSAY W/OPTIC: CPT | Performed by: FAMILY MEDICINE

## 2020-02-14 PROCEDURE — G8482 FLU IMMUNIZE ORDER/ADMIN: HCPCS | Performed by: FAMILY MEDICINE

## 2020-02-14 PROCEDURE — 99213 OFFICE O/P EST LOW 20 MIN: CPT | Performed by: FAMILY MEDICINE

## 2020-02-14 PROCEDURE — 3017F COLORECTAL CA SCREEN DOC REV: CPT | Performed by: FAMILY MEDICINE

## 2020-02-14 PROCEDURE — G8427 DOCREV CUR MEDS BY ELIG CLIN: HCPCS | Performed by: FAMILY MEDICINE

## 2020-02-14 RX ORDER — OMEPRAZOLE 20 MG/1
CAPSULE, DELAYED RELEASE ORAL
COMMUNITY
Start: 2020-02-07 | End: 2020-07-06

## 2020-02-14 RX ORDER — TRIAMCINOLONE ACETONIDE 0.1 %
PASTE (GRAM) DENTAL
COMMUNITY
Start: 2020-01-17 | End: 2020-11-02

## 2020-02-14 RX ORDER — BENZONATATE 100 MG/1
100-200 CAPSULE ORAL 3 TIMES DAILY PRN
Qty: 30 CAPSULE | Refills: 0 | Status: SHIPPED | OUTPATIENT
Start: 2020-02-14 | End: 2020-02-21

## 2020-02-14 ASSESSMENT — PATIENT HEALTH QUESTIONNAIRE - PHQ9
2. FEELING DOWN, DEPRESSED OR HOPELESS: 0
1. LITTLE INTEREST OR PLEASURE IN DOING THINGS: 0
SUM OF ALL RESPONSES TO PHQ QUESTIONS 1-9: 0
SUM OF ALL RESPONSES TO PHQ QUESTIONS 1-9: 0
SUM OF ALL RESPONSES TO PHQ9 QUESTIONS 1 & 2: 0

## 2020-02-14 NOTE — PROGRESS NOTES
Normal range of motion. No joint swelling noted. No peripheral edema. Skin:    Skin is warm and dry. No rashes, lesions. Current Outpatient Medications on File Prior to Visit   Medication Sig Dispense Refill    triamcinolone acetonide (KENALOG) 0.1 % paste       omeprazole (PRILOSEC) 20 MG delayed release capsule       lisinopril (PRINIVIL;ZESTRIL) 5 MG tablet TAKE ONE TABLET BY MOUTH DAILY 30 tablet 5    ondansetron (ZOFRAN ODT) 4 MG disintegrating tablet Take 1 tablet by mouth every 8 hours as needed for Nausea or Vomiting (Nausea or Dry heaves) 15 tablet 0    RESTASIS 0.05 % ophthalmic emulsion instill 1 drop into both eyes twice a day  0    ibuprofen (ADVIL;MOTRIN) 200 MG tablet Take 200 mg by mouth every 6 hours as needed for Pain      fluticasone (FLONASE) 50 MCG/ACT nasal spray 2 sprays by Nasal route daily (Patient taking differently: 2 sprays by Nasal route daily as needed for Rhinitis or Allergies ) 1 Bottle 3    Calcium Carbonate-Vit D-Min (CALCIUM 1200 PO) Take 1 tablet by mouth 2 times daily Last dose 9/11/2018      anastrozole (ARIMIDEX) 1 MG tablet Take 1 mg by mouth daily      hydrOXYzine (VISTARIL) 25 MG capsule Take 1 capsule by mouth 2 times daily (Patient not taking: Reported on 2/14/2020) 60 capsule 1     No current facility-administered medications on file prior to visit. Patient Active Problem List   Diagnosis Code    Chronic otitis externa of right ear H60.61    Essential hypertension I10    Gastroesophageal reflux disease K21.9    Malignant neoplasm of right female breast ([facility] [address] C50.911    Osteoporosis M81.0        Lilliana / Anne Morales was seen today for generalized body aches and ear fullness. Diagnoses and all orders for this visit:    Viral URI    Body aches  -     POCT Influenza A/B - negative    Throat pain  -     POCT rapid strep A - negative     Other orders  -     benzonatate (TESSALON) 100 MG capsule;  Take 1-2 capsules by mouth 3 times daily as needed for Cough    Sore throat and Ear Pain = Pain control: may take 2-3 ibuprofen every 6 hours as needed for pain. Hydration: Continue to hydrate throughout the day. Sputum gets thicker at night is we are dehydrated. Cough: You can buy DayQuil Over-The-Counter for cough, congestion, and sore throat. We also sent a prescription for tesslon perles to your pharmacy to see if this helps. You can also continue to use the honey. Stop the robitussin. Vitals normal. No fevers, essentially normal exam except for ear and throat findings. Call for worsening, fever development, failure to improve over the next week. Patient counseled to follow up sooner or seek more acute care if symptoms worsening. Electronically signed by Santana Abreu MD on 2/14/2020    This note may have been created using dictation software.  Efforts were made to reduce grammatical or syntax errors, but some may persist.

## 2020-02-14 NOTE — PATIENT INSTRUCTIONS
Sore throat and Ear Pain = Pain control: may take 2-3 ibuprofen every 6 hours as needed for pain. Hydration: Continue to hydrate throughout the day. Sputum gets thicker at night is we are dehydrated. Cough: You can buy DayQuil Over-The-Counter for cough, congestion, and sore throat. We also sent a prescription for tesslon perles to your pharmacy to see if this helps. You can also continue to use the honey. Stop the robitussin.

## 2020-02-24 ENCOUNTER — TELEPHONE (OUTPATIENT)
Dept: FAMILY MEDICINE CLINIC | Age: 60
End: 2020-02-24

## 2020-02-24 RX ORDER — AMOXICILLIN AND CLAVULANATE POTASSIUM 875; 125 MG/1; MG/1
1 TABLET, FILM COATED ORAL 2 TIMES DAILY WITH MEALS
Qty: 20 TABLET | Refills: 0 | Status: SHIPPED | OUTPATIENT
Start: 2020-02-24 | End: 2020-03-05

## 2020-06-08 ASSESSMENT — ENCOUNTER SYMPTOMS
BLOOD IN STOOL: 0
SORE THROAT: 0
SINUS PRESSURE: 0
RHINORRHEA: 0
DIARRHEA: 0
ABDOMINAL DISTENTION: 0
APNEA: 0
CONSTIPATION: 0
ROS SKIN COMMENTS: DENIES BREAST SKIN CHANGES, ARM SWELLING, OR PALPABLE AXILLARY OR SUPRACLAVICULAR ADENOPATHY.
ABDOMINAL PAIN: 0
SINUS PAIN: 0
COUGH: 0
EYE PAIN: 0
VOMITING: 0
VOICE CHANGE: 0
SHORTNESS OF BREATH: 0
COLOR CHANGE: 0
EYE ITCHING: 0
CHOKING: 0
CHEST TIGHTNESS: 0
TROUBLE SWALLOWING: 0
WHEEZING: 0
NAUSEA: 0
EYE DISCHARGE: 0
EYES NEGATIVE: 1

## 2020-06-08 NOTE — PROGRESS NOTES
distortion, suspicious calcifications, or additional suspicious findings are identified.  There are no significant changes from the prior study.       IMPRESSION:   No mammographic evidence of malignancy.       Screening mammogram in 1 year is recommended.       =======================================   BI-RADS Category 1:  Negative   =======================================         Kamila 15, 2020 Dexa SCan, [facility]    FINDINGS:       Lumbar spine: Total bone mineral density of L1-4 on frontal imaging is 0.819 g/sq cm   with a T score of -3.0.  This represents a 6.5% increase in bone   mineral density relative to the 2018 study.       Right hip:   Bone mineral density of the right femoral neck is 0.773 g/sq cm with a   T score of    -1.9.       Left hip:   Bone mineral density of the left femoral neck is 0.794 g/sq cm with a   T score of    -1.8.       Comparison of the mean value of both hips to the prior exam   demonstrates a 0.1% increase in bone mineral density on today's study.       The 10 year probability of major osteoporotic fracture is 10.1%, and   major hip fracture is 2.1%.                    Past Medical History:   Diagnosis Date    Anxiety     Arthritis     Cancer ([facility] [address]     breast right    Encounter for screening colonoscopy     GERD (gastroesophageal reflux disease)     Headache     Hyperlipidemia     Hypertension     Osteoporosis     PONV (postoperative nausea and vomiting)      Past Surgical History:   Procedure Laterality Date    APPENDECTOMY      CARDIOVASCULAR STRESS TEST      CHOLECYSTECTOMY  1984    COLONOSCOPY  09/17/2018    COLONOSCOPY N/A 9/17/2018    COLONOSCOPY POLYPECTOMY SNARE/COLD BIOPSY performed by Yadiel Guy MD at [address] [place] [address], COLON, DIAGNOSTIC      HERNIA REPAIR  2009,2010    HYSTERECTOMY  2009    MASTECTOMY Right 2015    simple, blue dye, with right axillary sentinel lymph node excision    UPPER GASTROINTESTINAL ENDOSCOPY  04/03/2017 osteoporotic fracture is 10.1%, and   major hip fracture is 2.1%.          Patient has no evidence of recurrent disease. Having right chest wall spasm-discussed heat application/stretching. Also has arthralgias/myalgias. Has one more year left of AI therapy-will discuss with Dr. Sadia Gutierrez. Questions answered to patient's satisfaction. OV/imaging in one year             Plan:        1. Continue monthly breast self examination. Bring any changes to your physician's attention. 2. Routine screening imaging in June 2021  3. Continue Arimidex 1 mg daily at the discretion of medical oncology. 4. Calcium and vitamin D daily while on Arimidex. Also takes Prolia monthly. 5. Smoking cessation reviewed again today. 6. DEXA June 2021  7. Avoid excessive caffeine intake. 8. Oncology appointments as scheduled with Oncologist-Dr. Sadia Gutierrez and primary care. 9. Follow up in 1 year with mammogram (Left) and DEXA same day & mammogram same day. Anu Welsh MD [facility]  Kamila 15, 2020

## 2020-06-15 ENCOUNTER — HOSPITAL ENCOUNTER (OUTPATIENT)
Dept: GENERAL RADIOLOGY | Age: 60
Discharge: HOME | End: 2020-06-17
Payer: COMMERCIAL

## 2020-06-15 ENCOUNTER — OFFICE VISIT (OUTPATIENT)
Dept: BREAST CENTER | Age: 60
End: 2020-06-15
Payer: COMMERCIAL

## 2020-06-15 VITALS
BODY MASS INDEX: 27.88 KG/M2 | OXYGEN SATURATION: 97 % | TEMPERATURE: 99 F | HEIGHT: 60 IN | SYSTOLIC BLOOD PRESSURE: 118 MMHG | DIASTOLIC BLOOD PRESSURE: 62 MMHG | RESPIRATION RATE: 18 BRPM | HEART RATE: 84 BPM | WEIGHT: 142 LBS

## 2020-06-15 PROCEDURE — 99213 OFFICE O/P EST LOW 20 MIN: CPT | Performed by: SURGERY

## 2020-06-15 PROCEDURE — 3017F COLORECTAL CA SCREEN DOC REV: CPT | Performed by: SURGERY

## 2020-06-15 PROCEDURE — G8427 DOCREV CUR MEDS BY ELIG CLIN: HCPCS | Performed by: SURGERY

## 2020-06-15 PROCEDURE — 99214 OFFICE O/P EST MOD 30 MIN: CPT | Performed by: SURGERY

## 2020-06-15 PROCEDURE — 77063 BREAST TOMOSYNTHESIS BI: CPT

## 2020-06-15 PROCEDURE — 1036F TOBACCO NON-USER: CPT | Performed by: SURGERY

## 2020-06-15 PROCEDURE — G8417 CALC BMI ABV UP PARAM F/U: HCPCS | Performed by: SURGERY

## 2020-06-15 PROCEDURE — 77080 DXA BONE DENSITY AXIAL: CPT

## 2020-06-15 ASSESSMENT — ENCOUNTER SYMPTOMS: BACK PAIN: 1

## 2020-06-16 ENCOUNTER — TELEPHONE (OUTPATIENT)
Dept: BREAST CENTER | Age: 60
End: 2020-06-16

## 2020-07-01 ENCOUNTER — TELEPHONE (OUTPATIENT)
Dept: FAMILY MEDICINE CLINIC | Age: 60
End: 2020-07-01

## 2020-07-06 RX ORDER — LISINOPRIL 5 MG/1
TABLET ORAL
Qty: 30 TABLET | Refills: 5 | Status: SHIPPED
Start: 2020-07-06 | End: 2020-09-25 | Stop reason: SDUPTHER

## 2020-07-06 RX ORDER — OMEPRAZOLE 20 MG/1
CAPSULE, DELAYED RELEASE ORAL
Qty: 30 CAPSULE | Refills: 5 | Status: SHIPPED
Start: 2020-07-06 | End: 2020-09-25 | Stop reason: SDUPTHER

## 2020-07-06 NOTE — TELEPHONE ENCOUNTER
Last Appointment:  2/14/2020  Future Appointments   Date Time Provider Tonio Jimenes   6/21/2021 11:00 AM [address] [facility] [address], APRN - CNP [facility]

## 2020-07-07 ENCOUNTER — HOSPITAL ENCOUNTER (OUTPATIENT)
Age: 60
Discharge: HOME | End: 2020-07-09
Payer: COMMERCIAL

## 2020-07-07 LAB
ANION GAP SERPL CALCULATED.3IONS-SCNC: 20 MMOL/L (ref 7–16)
BUN BLDV-MCNC: 13 MG/DL (ref 8–23)
CALCIUM SERPL-MCNC: 10 MG/DL (ref 8.6–10.2)
CHLORIDE BLD-SCNC: 97 MMOL/L (ref 98–107)
CO2: 20 MMOL/L (ref 22–29)
CREAT SERPL-MCNC: 1.1 MG/DL (ref 0.5–1)
GFR AFRICAN AMERICAN: >60
GFR NON-AFRICAN AMERICAN: 50 ML/MIN/1.73
GLUCOSE BLD-MCNC: 99 MG/DL (ref 74–99)
POTASSIUM SERPL-SCNC: 4.7 MMOL/L (ref 3.5–5)
SODIUM BLD-SCNC: 137 MMOL/L (ref 132–146)

## 2020-07-07 PROCEDURE — 80048 BASIC METABOLIC PNL TOTAL CA: CPT

## 2020-07-07 PROCEDURE — 36415 COLL VENOUS BLD VENIPUNCTURE: CPT

## 2020-07-14 ENCOUNTER — HOSPITAL ENCOUNTER (OUTPATIENT)
Dept: INFUSION THERAPY | Age: 60
Setting detail: INFUSION SERIES
End: 2020-07-14
Payer: COMMERCIAL

## 2020-09-14 ENCOUNTER — TELEPHONE (OUTPATIENT)
Dept: FAMILY MEDICINE CLINIC | Age: 60
End: 2020-09-14

## 2020-09-14 RX ORDER — PRAMOXINE HYDROCHLORIDE 10 MG/G
AEROSOL, FOAM TOPICAL
Qty: 1 CAN | Refills: 0 | Status: SHIPPED
Start: 2020-09-14 | End: 2021-04-26

## 2020-09-14 NOTE — TELEPHONE ENCOUNTER
Sitz baths. Stool softener if needed - no straining. Consider shower after BMs to keep area clean - this helps. Will send proctofoam for relief of pain, but not much works great quickly.

## 2020-09-25 ENCOUNTER — OFFICE VISIT (OUTPATIENT)
Dept: FAMILY MEDICINE CLINIC | Age: 60
End: 2020-09-25
Payer: COMMERCIAL

## 2020-09-25 VITALS
DIASTOLIC BLOOD PRESSURE: 74 MMHG | OXYGEN SATURATION: 97 % | WEIGHT: 141 LBS | HEIGHT: 60 IN | TEMPERATURE: 97.6 F | BODY MASS INDEX: 27.68 KG/M2 | HEART RATE: 82 BPM | SYSTOLIC BLOOD PRESSURE: 138 MMHG

## 2020-09-25 PROCEDURE — G8417 CALC BMI ABV UP PARAM F/U: HCPCS | Performed by: FAMILY MEDICINE

## 2020-09-25 PROCEDURE — G8427 DOCREV CUR MEDS BY ELIG CLIN: HCPCS | Performed by: FAMILY MEDICINE

## 2020-09-25 PROCEDURE — 99214 OFFICE O/P EST MOD 30 MIN: CPT | Performed by: FAMILY MEDICINE

## 2020-09-25 PROCEDURE — 1036F TOBACCO NON-USER: CPT | Performed by: FAMILY MEDICINE

## 2020-09-25 PROCEDURE — 3017F COLORECTAL CA SCREEN DOC REV: CPT | Performed by: FAMILY MEDICINE

## 2020-09-25 RX ORDER — LISINOPRIL 5 MG/1
TABLET ORAL
Qty: 30 TABLET | Refills: 5 | Status: SHIPPED
Start: 2020-09-25 | End: 2021-03-29 | Stop reason: SDUPTHER

## 2020-09-25 RX ORDER — ANASTROZOLE 1 MG/1
1 TABLET ORAL DAILY
Qty: 30 TABLET | Status: CANCELLED | OUTPATIENT
Start: 2020-09-25

## 2020-09-25 RX ORDER — OMEPRAZOLE 20 MG/1
CAPSULE, DELAYED RELEASE ORAL
Qty: 30 CAPSULE | Refills: 5 | Status: SHIPPED
Start: 2020-09-25 | End: 2021-03-29 | Stop reason: SDUPTHER

## 2020-09-25 NOTE — PROGRESS NOTES
[facility]  Office Progress Note - Dr. Alisa Phalen  9/25/20    CC:   Chief Complaint   Patient presents with    Anxiety     hemorrhoids have resolved, had some anemia -iron supplements do not agree with her. HPI: had a hemorrhoid  Its better now  Exacerbated by iron therapy and constip  Discussed bowel regimen. Ahead of any iron infusions or tabs. hasnt had stool tested for blood. Has been taking a lot of ibu for knee pain    Right knee pain  Worse with walking, movement  Feels a clicking and popping -something. Medial jointline tenderness. Difficult to walk at times. Ice worsens. She has bene wearing maybe a hinged knee brace or somehing with lateral stabilizers - which does help it feel better  Many topicals used without much relief. Mood is ok she reports  Stresses with covid, but now better. Coping fine. Wearing masks. Hypertension  Follow-up  Blood pressure is borderline controlled today. Better on recheck. BP Readings from Last 3 Encounters:   09/25/20 138/74   06/15/20 118/62   02/14/20 136/72   continues lisinopril 5  Patient continues medications regularly. Compliance is good. Denies CP, sob, abd pain, headaches, vision changes, dizziness, hypotensive symptoms. No side effects from medications noted.     _________________________________________________________  Past Medical History:   Diagnosis Date    Anxiety     Arthritis     Cancer ([facility] [address]     breast right    Encounter for screening colonoscopy     GERD (gastroesophageal reflux disease)     Headache     Hyperlipidemia     Hypertension     Osteoporosis     PONV (postoperative nausea and vomiting)        Family History   Problem Relation Age of Onset    High Blood Pressure Mother     Asthma Mother     Breast Cancer Mother     Cancer Mother          breast cancer    Heart Disease Father     High Blood Pressure Sister     High Blood Pressure Sister     Cancer Maternal Aunt and well-nourished. Cardiovascular:    Normal rate, regular rhythm and normal heart sounds. No murmur. No gallop and no friction rub. Pulmonary/Chest:    Effort normal and breath sounds normal.    No wheezes. No rales or rhonchi. Abdominal:    Soft. Bowel sounds are normal.    No distension. No tenderness. Musculoskeletal:    Normal range of motion. Right knee stable without laxity. No crepitus. Medial joint line is area of tenderness but not tender today. No joint swelling noted. No peripheral edema. Skin:    Skin is warm and dry. No rashes, No lesions. Psychiatric:    She has a normal mood and affect. Normal groom and dress. No SI or HI.   ________________________________________________________  Current Outpatient Medications on File Prior to Visit   Medication Sig Dispense Refill    pramoxine HCl 1 % foam Apply perianally twice daily for hemorrhoid symptoms. 1 Can 0    triamcinolone acetonide (KENALOG) 0.1 % paste       ondansetron (ZOFRAN ODT) 4 MG disintegrating tablet Take 1 tablet by mouth every 8 hours as needed for Nausea or Vomiting (Nausea or Dry heaves) 15 tablet 0    RESTASIS 0.05 % ophthalmic emulsion instill 1 drop into both eyes twice a day  0    ibuprofen (ADVIL;MOTRIN) 200 MG tablet Take 200 mg by mouth every 6 hours as needed for Pain      Calcium Carbonate-Vit D-Min (CALCIUM 1200 PO) Take 1 tablet by mouth 2 times daily Last dose 9/11/2018      anastrozole (ARIMIDEX) 1 MG tablet Take 1 mg by mouth daily      hydrOXYzine (VISTARIL) 25 MG capsule Take 1 capsule by mouth 2 times daily (Patient not taking: Reported on 2/14/2020) 60 capsule 1    fluticasone (FLONASE) 50 MCG/ACT nasal spray 2 sprays by Nasal route daily (Patient not taking: Reported on 9/25/2020) 1 Bottle 3     No current facility-administered medications on file prior to visit.         Patient Active Problem List   Diagnosis Code    Chronic otitis externa of right ear H60.61    Essential

## 2020-09-28 ENCOUNTER — TELEPHONE (OUTPATIENT)
Dept: FAMILY MEDICINE CLINIC | Age: 60
End: 2020-09-28

## 2020-09-28 LAB
CONTROL: ABNORMAL
HEMOCCULT STL QL: POSITIVE

## 2020-10-08 ENCOUNTER — NURSE ONLY (OUTPATIENT)
Dept: FAMILY MEDICINE CLINIC | Age: 60
End: 2020-10-08
Payer: COMMERCIAL

## 2020-10-08 PROCEDURE — 90471 IMMUNIZATION ADMIN: CPT | Performed by: FAMILY MEDICINE

## 2020-10-08 PROCEDURE — 90686 IIV4 VACC NO PRSV 0.5 ML IM: CPT | Performed by: FAMILY MEDICINE

## 2020-10-15 ENCOUNTER — OFFICE VISIT (OUTPATIENT)
Dept: SURGERY | Age: 60
End: 2020-10-15
Payer: COMMERCIAL

## 2020-10-15 VITALS
HEIGHT: 60 IN | HEART RATE: 86 BPM | SYSTOLIC BLOOD PRESSURE: 119 MMHG | OXYGEN SATURATION: 95 % | BODY MASS INDEX: 27.33 KG/M2 | WEIGHT: 139.2 LBS | DIASTOLIC BLOOD PRESSURE: 79 MMHG | TEMPERATURE: 97.2 F

## 2020-10-15 PROCEDURE — G8427 DOCREV CUR MEDS BY ELIG CLIN: HCPCS | Performed by: SURGERY

## 2020-10-15 PROCEDURE — 99213 OFFICE O/P EST LOW 20 MIN: CPT | Performed by: SURGERY

## 2020-10-15 PROCEDURE — G8417 CALC BMI ABV UP PARAM F/U: HCPCS | Performed by: SURGERY

## 2020-10-15 PROCEDURE — 4004F PT TOBACCO SCREEN RCVD TLK: CPT | Performed by: SURGERY

## 2020-10-15 PROCEDURE — G8482 FLU IMMUNIZE ORDER/ADMIN: HCPCS | Performed by: SURGERY

## 2020-10-15 PROCEDURE — 3017F COLORECTAL CA SCREEN DOC REV: CPT | Performed by: SURGERY

## 2020-10-15 NOTE — PATIENT INSTRUCTIONS
Bharati Cervantes MD, FACS    Preoperative Instructions    Please read the following information very carefully. It contains information that is necessary to best prepare you for your upcoming procedure. Make arrangements for a  to take you to and from your procedure. YOU MUST HAVE SOMEONE DRIVE YOU HOME - this cannot be a taxi or public transportation. You will not be administered anesthesia without someone to go home and be at home with you that day. Nothing to eat or drink after midnight the night before your procedure. Follow your bowel prep instructions if you have them for this procedure. 3 days prior to your procedure: Stop taking blood thinners like Coumadin or Plavix or Xarelto. 5 days prior to your procedure: Stop taking Aspirin or Aspirin containing products. If you cannot stop any of these medications prior to your procedure, please contact our office. Medications morning of procedure: Only heart, breathing, blood pressure, and seizure medications are permitted on the morning of your procedure. These medications can be taken with a sip of water. IF YOU ARE UNABLE TO KEEP THE ABOVE SCHEDULED PROCEDURE, YOU MUST NOTIFY DR. TSE'S OFFICE [phone]. NOT THE FACILITY. NO CHEWING GUM OR CHEWING TOBACCO AFTER MIDNIGHT ON DAY OF PROCEDURE.    YOU MUST HAVE TRANSPORTATION TO AND FROM THE FACILITY. What is a colonoscopy? A colonoscopy is a test that lets a doctor look inside your colon. The doctor uses a thin, lighted tube called a colonoscope to look for problems. These include small growths called polyps, cancer, or bleeding. During the test, the doctor can take samples of tissue that can be checked for cancer or other problems. This is called a biopsy. The doctor can also take out polyps. Before the test, you will need to stop eating solid foods. You also will drink a liquid or take a tablet that cleans out your colon.  This helps your doctor be able to see inside your colon during the test.  Follow-up care is a key part of your treatment and safety. Be sure to make and go to all appointments, and call your doctor if you are having problems. It's also a good idea to know your test results and keep a list of the medicines you take. What happens before the procedure? Procedures can be stressful. This information will help you understand what you can expect. And it will help you safely prepare for your procedure. Preparing for the procedure  · Understand exactly what procedure is planned, along with the risks, benefits, and other options. · Tell your doctors ALL the medicines, vitamins, supplements, and herbal remedies you take. Some of these can increase the risk of bleeding or interact with anesthesia. · If you take blood thinners, such as warfarin (Coumadin), clopidogrel (Plavix), or aspirin, be sure to talk to your doctor. He or she will tell you if you should stop taking these medicines before your procedure. Make sure that you understand exactly what your doctor wants you to do. · Your doctor will tell you which medicines to take or stop before your procedure. You may need to stop taking certain medicines a week or more before the procedure. So talk to your doctor as soon as you can. · If you have an advance directive, let your doctor know. It may include a living will and a durable power of  for health care. Bring a copy to the hospital. If you don't have one, you may want to prepare one. It lets your doctor and loved ones know your health care wishes. Doctors advise that everyone prepare these papers before any type of surgery or procedure. Before the procedure  · Follow your doctor's directions about when to stop eating solid foods and drink only clear liquids. You can drink water, clear juices, clear broths, flavored ice pops, and gelatin (such as Jell-O). Do not eat or drink anything red or purple.  This includes grape juice and grape-flavored ice pops. It also includes fruit punch and cherry gelatin. · Drink the \"colon prep\" liquid as your doctor tells you. You will want to stay home, because the liquid will make you go to the bathroom a lot. Your stools will be loose and watery. It is very important to drink all of the liquid. If you have problems drinking it, call your doctor. Some doctors may have you take a tablet rather than drink a liquid. · Do not eat any solid foods after you drink the colon prep. · Stop drinking clear liquids 6 to 8 hours before the test.  What happens on the day of the procedure? · Follow the instructions exactly about when to stop eating and drinking. If you don't, your procedure may be canceled. If your doctor told you to take your medicines on the day of the procedure, take them with only a sip of water. · Take a bath or shower before you come in for your procedure. Do not apply lotions, perfumes, deodorants, or nail polish. · Take off all jewelry and piercings. And take out contact lenses, if you wear them. At the [address] [place] [address] or hospital  · Bring a picture ID. · You will be kept comfortable and safe by your anesthesia provider. The anesthesia may make you sleep. · You will lie on your back or your side with your knees drawn up toward your belly. The doctor will gently put a gloved finger into your anus. Then the doctor puts the scope in and moves it into your colon. The scope goes in easily because it is lubricated. · The doctor may also use small tools to take tissue samples for a biopsy or to remove polyps. This does not hurt. · The test usually takes 30 to 45 minutes. But it may take longer. It depends on what is found and what is done. Going home  · Be sure you have someone to drive you home. Anesthesia and pain medicine make it unsafe for you to drive. · You will be given more specific instructions about recovering from your procedure. When should you call your doctor?   · You have questions or concerns. · You don't understand how to prepare for your procedure. · You are having trouble with the bowel prep. · You become ill before the procedure (such as fever, flu, or a cold). · You need to reschedule or have changed your mind about having the procedure. Where can you learn more? Go to https://[employer]pepiceweb.[email or web address]. org and sign in to your [employer] account. Enter C315 in the [employer] box to learn more about Colonoscopy: Before Your Procedure.     If you do not have an account, please click on the Sign Up Now link. © 8028-5086 Healthwise, Incorporated. Care instructions adapted under license by [facility] ([facility]). This care instruction is for use with your licensed healthcare professional. If you have questions about a medical condition or this instruction, always ask your healthcare professional. [address] any warranty or liability for your use of this information.   Content Version: 73.8.929241; Current as of: November 20, 2015

## 2020-10-15 NOTE — PROGRESS NOTES
General Surgery History and Physical    Patient's Name/Date of Birth: Steven Esteban / 1960    Date: 10/15/2020    PCP: Regino Calderon MD    Referring Physician:   Danis Laurent  [phone]    CHIEF COMPLAINT:    Chief Complaint   Patient presents with    Follow-up     c/o blood in stool and anemia         HISTORY OF PRESENT ILLNESS:    Steven Esteban is an 56 y.o. female who presents for a colonoscopy. The patient is anemic. She has been going to the [facility]. She said she has been getting iron infusions. She had a positive hemoccult. She said said she is fatigued. No nausea, vomiting. No changes in stool caliber. No black stools. No abdominal pain. No unintentional weight loss. No family history of colon cancer. The patient has a known history of: colon polyps. The patient has had a colonoscopy before - she had one two years ago and I removed polyps at that time. She said her hemorrhoids flare with certain foods. She said she goes back and forth between diarrhea and constipation.     Past Medical History:   Past Medical History:   Diagnosis Date    Anxiety     Arthritis     Cancer ([address]     breast right    Encounter for screening colonoscopy     GERD (gastroesophageal reflux disease)     Headache     Hyperlipidemia     Hypertension     Osteoporosis     PONV (postoperative nausea and vomiting)         Past Surgical History:   Past Surgical History:   Procedure Laterality Date    APPENDECTOMY      CARDIOVASCULAR STRESS TEST      CHOLECYSTECTOMY  1984    COLONOSCOPY  09/17/2018    COLONOSCOPY N/A 9/17/2018    COLONOSCOPY POLYPECTOMY SNARE/COLD BIOPSY performed by Brenna Reyna MD at [address] [place] [address], DIAGNOSTIC      HERNIA REPAIR  2009,2010    HYSTERECTOMY  2009    MASTECTOMY Right 2015    simple, blue dye, with right axillary sentinel lymph node excision    UPPER GASTROINTESTINAL ENDOSCOPY  04/03/2017        Allergies: Codeine Medications:   Current Outpatient Medications   Medication Sig Dispense Refill    lisinopril (PRINIVIL;ZESTRIL) 5 MG tablet take 1 tablet by mouth once daily 30 tablet 5    omeprazole (PRILOSEC) 20 MG delayed release capsule take 1 capsule by mouth once daily WITH BREAKFAST 30 capsule 5    pramoxine HCl 1 % foam Apply perianally twice daily for hemorrhoid symptoms. 1 Can 0    triamcinolone acetonide (KENALOG) 0.1 % paste       ondansetron (ZOFRAN ODT) 4 MG disintegrating tablet Take 1 tablet by mouth every 8 hours as needed for Nausea or Vomiting (Nausea or Dry heaves) 15 tablet 0    RESTASIS 0.05 % ophthalmic emulsion instill 1 drop into both eyes twice a day  0    hydrOXYzine (VISTARIL) 25 MG capsule Take 1 capsule by mouth 2 times daily 60 capsule 1    ibuprofen (ADVIL;MOTRIN) 200 MG tablet Take 200 mg by mouth every 6 hours as needed for Pain      fluticasone (FLONASE) 50 MCG/ACT nasal spray 2 sprays by Nasal route daily 1 Bottle 3    Calcium Carbonate-Vit D-Min (CALCIUM 1200 PO) Take 1 tablet by mouth 2 times daily Last dose 9/11/2018      anastrozole (ARIMIDEX) 1 MG tablet Take 1 mg by mouth daily       No current facility-administered medications for this visit.           Social History:   Social History     Tobacco Use    Smoking status: Current Every Day Smoker     Packs/day: 0.50     Years: 41.00     Pack years: 20.50     Types: Cigarettes    Smokeless tobacco: Never Used   Substance Use Topics    Alcohol use: No     Alcohol/week: 0.0 standard drinks        Family History:   Family History   Problem Relation Age of Onset    High Blood Pressure Mother     Asthma Mother     Breast Cancer Mother     Cancer Mother          breast cancer    Heart Disease Father     High Blood Pressure Sister     High Blood Pressure Sister     Cancer Maternal Aunt         breast cancer    Cancer Other         Maternal Aunt Esophageal cancer       REVIEW OF SYSTEMS:    Constitutional: negative  Eyes: Gloria Talamantes MD and referring physician, Nir Clancy,*

## 2020-10-20 ENCOUNTER — TELEPHONE (OUTPATIENT)
Dept: SURGERY | Age: 60
End: 2020-10-20

## 2020-10-20 NOTE — TELEPHONE ENCOUNTER
Prior Authorization Form:      DEMOGRAPHICS:                     Patient Name:  Avani Marin  Patient :  1960            Insurance:  Payor: [employer] / Plan: Ryley [place] / Product Type: *No Product type* /   Insurance ID Number:    Payor/Plan Subscr  Sex Relation Sub.  Ins. ID Effective Group Num   1. [employer] - Kathyanne Saint 1960 Female  [id number] 12/1/15 [facility] [address]         DIAGNOSIS & PROCEDURE:                       Procedure/Operation: EGD COLONOSCOPY           CPT Code: 14856  93816    Diagnosis:  AMENIA  BLOOD IN STOOL    ICD10 Code: D64.9   K92.1    Location:  [place]    Surgeon:  Yesi Lane INFORMATION:                          Date: 2020    Time: 10:30             Anesthesia:  MAC/TIVA                                                       Status:  Outpatient        Special Comments:         Electronically signed by Igor Griffin MA on 10/20/2020 at 9:59 AM

## 2020-10-26 ENCOUNTER — EVALUATION (OUTPATIENT)
Dept: PHYSICAL THERAPY | Age: 60
End: 2020-10-26
Payer: COMMERCIAL

## 2020-10-26 PROCEDURE — 97110 THERAPEUTIC EXERCISES: CPT | Performed by: PHYSICAL THERAPIST

## 2020-10-26 PROCEDURE — 97161 PT EVAL LOW COMPLEX 20 MIN: CPT | Performed by: PHYSICAL THERAPIST

## 2020-10-26 NOTE — PROGRESS NOTES
[address] [facility]   Phone: [phone]   Fax: [phone]      Physical Therapy Daily Treatment Note    Date: 10/26/2020  Patient Name: Gerhardt Rake  : 1960   MRN: [id number]  [facility]: 2014/15   DOSx: NA  Referring Provider: Ninfa Cobos MD  [address] [facility] [address], [facility]     Medical Diagnosis:   M25.561, G89.29 (ICD-10-CM) - Chronic pain of right knee     Outcome Measure:  LEFS 47/80     S: See eval.   O:   Time 1103- 1142     Visit 1 Repeat outcome measure at mid point and end. Pain 1/10     ROM 0-140      Modalities            Manual            Stretch                  Exercise      Bike      Heel slides      QS 30 x 5s with towel under knee HEP TE   SLR 1 x 10  HEP TE   SAQ 2 x 10  HEP TE   LAQ      Hamstring Curl       TG squats      TG calf raises      Step-ups - FWD      Step-ups - LAT      Step-ups - BWD        NMR To improve balance for safe community and home ambulation    Resisted walk      FWD      BKWD      lat      March      Side stepping      Retro walk      Heel to toe      A:  Tolerated well. Above added to written HEP.     P: Continue with rehab plan  Nic Rivera, PT DPT, PT [id number]    Treatment Charges: Mins Units   Initial Evaluation 29 1   Re-Evaluation     Ther Exercise         TE 10 1   Manual Therapy     MT     Ther Activities        TA     Gait Training          GT     Neuro Re-education NR     Modalities     Non-Billable Service Time     Other     Total Time/Units 39  2

## 2020-10-26 NOTE — PROGRESS NOTES
[address] [facility]   Phone: [phone]   Fax: [phone]         Date:  10/26/2020   Patient: Silvia Chacon  : 1960  MRN: [id number]  Referring Provider: MD Bobby Mckeon [address] [address]     Medical Diagnosis:   M25.561, G89.29 (ICD-10-CM) - Chronic pain of right knee      SUBJECTIVE:     Onset date: since      Onset: Sudden onset    Mechanism of Injury: Pt reports in  or  slammed down on knee at work; had an xray that showed a torn meniscus and was getting cortisone shots. Pt reports they weren't lasting anymore and stopped getting them. Pt reports currently the knee just feels achey. Approximately 2 weeks ago pt had difficulty even walking on it. Pt reports when she relaxes it for a day then pt reports does ok again. Pt reports lives on second floor. Pt reports got a brace with lateral and medial support but it made the knee hurt worse at the time. Pt reports had an MRI that showed a meniscus tear in . Pt reports her knee 'pops' a lot. Previous PT: none     Medical Management for Current Problem: OTC meds     Chief complaint: pain    Behavior: condition is staying the same    Pain: waxing and waning  Current: 1/10     Best: 0/10     Worst:10/10    Symptom Type/Quality: aching  Location[de-identified] Knee: medial     Aggravated by: walking, stairs, getting in/out bathtub    Relieved by: rest    Imaging results: No results found.     Past Medical History:  Past Medical History:   Diagnosis Date    Anxiety     Arthritis     Cancer ([address]     breast right    Encounter for screening colonoscopy     GERD (gastroesophageal reflux disease)     Headache     Hyperlipidemia     Hypertension     Osteoporosis     PONV (postoperative nausea and vomiting)      Past Surgical History:   Procedure Laterality Date    APPENDECTOMY      CARDIOVASCULAR STRESS TEST      CHOLECYSTECTOMY      COLONOSCOPY 09/17/2018    COLONOSCOPY N/A 9/17/2018    COLONOSCOPY POLYPECTOMY SNARE/COLD BIOPSY performed by [address], MD at [address] [facility], COLON, DIAGNOSTIC      HERNIA REPAIR  2009,2010    HYSTERECTOMY  2009    MASTECTOMY Right 2015    simple, blue dye, with right axillary sentinel lymph node excision    UPPER GASTROINTESTINAL ENDOSCOPY  04/03/2017       Medications:   Current Outpatient Medications   Medication Sig Dispense Refill    lisinopril (PRINIVIL;ZESTRIL) 5 MG tablet take 1 tablet by mouth once daily 30 tablet 5    omeprazole (PRILOSEC) 20 MG delayed release capsule take 1 capsule by mouth once daily WITH BREAKFAST 30 capsule 5    pramoxine HCl 1 % foam Apply perianally twice daily for hemorrhoid symptoms. 1 Can 0    triamcinolone acetonide (KENALOG) 0.1 % paste       ondansetron (ZOFRAN ODT) 4 MG disintegrating tablet Take 1 tablet by mouth every 8 hours as needed for Nausea or Vomiting (Nausea or Dry heaves) 15 tablet 0    RESTASIS 0.05 % ophthalmic emulsion instill 1 drop into both eyes twice a day  0    hydrOXYzine (VISTARIL) 25 MG capsule Take 1 capsule by mouth 2 times daily 60 capsule 1    ibuprofen (ADVIL;MOTRIN) 200 MG tablet Take 200 mg by mouth every 6 hours as needed for Pain      fluticasone (FLONASE) 50 MCG/ACT nasal spray 2 sprays by Nasal route daily 1 Bottle 3    Calcium Carbonate-Vit D-Min (CALCIUM 1200 PO) Take 1 tablet by mouth 2 times daily Last dose 9/11/2018      anastrozole (ARIMIDEX) 1 MG tablet Take 1 mg by mouth daily       No current facility-administered medications for this visit. Occupation: does not work.      Exercise regimen: none    Hobbies: Codenvy      Patient Goals: pain relief    Precautions/Contraindications: none    OBJECTIVE:     Observations: well nourished female    Inspection: normal orthopedic exam      Gait: normal    Joint/Motion:    Knee:  Right:   [id number]° Flexion,  0° Extension    Left:   AROM:140° Flexion,  0° Extension      Strength:    Knee:   Right: Flexion 4/5,  Extension 4/5  Left: Flexion 5/5,  Extension 5/5    Palpation: Tender to palpation medial joint line    Special Tests/Functional Screens:    [] Lachman's []+ / [x] -    [] Anterior Drawer []+ / [x] -   [] Valgus Stress []+ / [x] -  [] Thessaly Test []+ / [x] -   [] Tessie's Sign []+ / [] -   [] Other: []+ / [] - [] Bounce Home []+ / [] -   [] Sumi [x]+ / [] -    [] Pivot Shift []+ / [] -   [] Posterior Drawer []+ / [] -   [] Varus Stress []+ / [x] -              ASSESSMENT     Outcome Measure:   Lower Extremity Functional Scale (LEFS) 47/80  impairment    Problems:    Pain reported 0-10 /10   Strength decreased    Decreased functional ability with walking, stairs    [x] There are no barriers affecting plan of care or recovery    [] Barriers to this patient's plan of care or recovery include. Domestic Concerns:  [x] No  [] Yes:    Short Term goals (3 weeks)   Decrease reported pain to 0-5/10   Increase Strength to 4+/5    Able to perform/complete the following functions/tasks: pt able to go up/down 5 steps with rail with minor pain/limitation. Pt able to walk 20 minutes with minor pain/limitation. Pt able to perform 10 sit to stands with UE support with minor pain/limitation.  Lower Extremity Functional Scale (LEFS) 55/80  impairment    Long Term goals (6 weeks)   Decrease reported pain to 0-3/10   Increase Strength to 5/5    Able to perform/complete the following functions/tasks: pt able to go up/down flight of steps with no pain/limitation. Pt able to walk 30 minutes with no pain/limitation. Pt able to perform 10 sit to stands with no UE support with no pain/limitation.     LEFS 60/80  impairment    Independent with Home Exercise Programs    Rehab Potential: [x] Good  [] Fair  [] Poor    PLAN       Treatment Plan:   [x] Therapeutic Exercise  [x] Therapeutic Activity  [x] Neuromuscular Re-education   [x] Gait Training  [x] Balance Training  [x] Aerobic conditioning  [x] Manual Therapy  [x] Massage/Fascial release   [x] Work/Sport specific activities    [] Pain Neuroscience [x] Cold/hotpack  [] Vasocompression  [x] Electrical Stimulation  [] Lumbar/Cervical Traction  [x] Ultrasound   [] Iontophoresis: 4 mg/mL Dexamethasone Sodium Phosphate 40-80 mAmin        [x] Instruction in HEP      []  Medication allergies reviewed for use of Dexamethasone Sodium Phosphate 4mg/ml  with iontophoresis treatments. Patient is not allergic. The following CPT codes are likely to be used in the care of this patient: 57950 PT Evaluation: Low Complexity , 06402 PT Re-Evaluation , 80377 Therapeutic Exercise , 49603 Neuromuscular Re-Education , 01585 Therapeutic Activities , 49791 Manual Therapy , 10216 Gait Training ,  Electrical Stimulation, [address]      Suggested Professional Referral: [x] No  [] Yes:     Patient Education:  [x] Plans/Goals, Risks/Benefits discussed  [x] Home exercise program  Method of Education: [x] Verbal  [x] Demo  [x] Written  Comprehension of Education:  [x] Verbalizes understanding. [x] Demonstrates understanding. [] Needs Review. [] Demonstrates/verbalizes understanding of HEP/Ed previously given. Frequency: 1-2 days per week for 6 weeks    Patient understands diagnosis/prognosis and consents to treatment, plan and goals: [x] Yes    [] No     Thank you for the opportunity to work with your patient. If you have questions or comments, please contact me at numbers listed above. Electronically signed by: Edouard Christina, PT PT DPT [id number]         Please sign Physician's Certification and return to: [place] [address]  [address] [facility] [address]  Dept: [phone]  Dept Fax: [phone] PT , DPT PT [id number]    Physician's Certification / Comments     Frequency/Duration 1-2 days per week for 6 weeks.    Certification period from 10/26/2020  to

## 2020-11-02 ENCOUNTER — HOSPITAL ENCOUNTER (OUTPATIENT)
Age: 60
Discharge: HOME | End: 2020-11-04
Payer: COMMERCIAL

## 2020-11-02 DIAGNOSIS — Z01.818 PREOP TESTING: ICD-10-CM

## 2020-11-02 PROCEDURE — U0003 INFECTIOUS AGENT DETECTION BY NUCLEIC ACID (DNA OR RNA); SEVERE ACUTE RESPIRATORY SYNDROME CORONAVIRUS 2 (SARS-COV-2) (CORONAVIRUS DISEASE [COVID-19]), AMPLIFIED PROBE TECHNIQUE, MAKING USE OF HIGH THROUGHPUT TECHNOLOGIES AS DESCRIBED BY CMS-2020-01-R: HCPCS

## 2020-11-02 RX ORDER — ACETAMINOPHEN 325 MG/1
650 TABLET ORAL EVERY 6 HOURS PRN
COMMUNITY

## 2020-11-02 NOTE — PROGRESS NOTES
Ben PRE-ADMISSION TESTING INSTRUCTIONS    The Preadmission Testing patient is instructed accordingly using the following criteria (check applicable):    ARRIVAL INSTRUCTIONS:  [x] Parking the day of Surgery is located in the Main Entrance lot. Upon entering the door, make an immediate right to the surgery reception desk    [x] Bring photo ID and insurance card    [] Bring in a copy of Living will or Durable Power of  papers. [x] Please be sure to arrange for responsible adult to provide transportation to and from the hospital    [x] Please arrange for responsible adult to be with you for the 24 hour period post procedure due to having anesthesia      GENERAL INSTRUCTIONS:    [x] Nothing by mouth after midnight, including gum, candy, mints or water    [x] You may brush your teeth, but do not swallow any water    [x] Take medications as instructed with 1-2 oz of water    [x] Stop herbal supplements and vitamins 5 days prior to procedure    [] Follow preop dosing of blood thinners per physician instructions    [] Take 1/2 dose of evening insulin, but no insulin after midnight    [] No oral diabetic medications after midnight    [] If diabetic and have low blood sugar or feel symptomatic, take 1-2oz apple juice only    [] Bring inhalers day of surgery    [] Bring C-PAP/ Bi-Pap day of surgery    [] Bring urine specimen day of surgery    [x]   no lotion, powders or creams     [x] Follow bowel prep as instructed per surgeon    [x] No tobacco products within 24 hours of surgery     [] No alcohol or illegal drug use within 24 hours of surgery.     [x] Jewelry, body piercing's, eyeglasses, contact lenses and dentures are not permitted into surgery (bring cases)      [x] Please do not wear any nail polish, make up or hair products on the day of surgery    [x] You can expect a call the business day prior to procedure to notify you if your arrival time changes    [x] If you receive a survey after surgery we would greatly appreciate your comments    [] Parent/guardian of a minor must accompany their child and remain on the premises  the entire time they are under our care     [] Pediatric patients may bring favorite toy, blanket or comfort item with them    [] A caregiver or family member must remain with the patient during their stay if they are mentally handicapped, have dementia, disoriented or unable to use a call light or would be a safety concern if left unattended    [x] Please notify surgeon if you develop any illness between now and time of surgery (cold, cough, sore throat, fever, nausea, vomiting) or any signs of infections  including skin, wounds, and dental.    []  The Outpatient Pharmacy is available to fill your prescription here on your day of surgery, ask your preop nurse for details    [] Other instructions    EDUCATIONAL MATERIALS PROVIDED:    [] PAT Preoperative Education Packet/Booklet     [] Medication List    [] Transfusion bracelet applied with instructions    [] Shower with soap, lather and rinse well, and use CHG wipes provided the evening before surgery as instructed    [] Incentive spirometer with instructions

## 2020-11-02 NOTE — PROGRESS NOTES
Have you been tested for COVID  Yes           Have you been told you were positive for COVID No  Have you had any known exposure to someone that is positive for COVID No  Do you have a cough                   No              Do you have shortness of breath No                 Do you have a sore throat            No                Are you having chills                    No                Are you having muscle aches. No                    Please come to the hospital wearing a mask and have your significant other wear a mask as well. Both of you should check your temperature before leaving to come here,  if it is 100 or higher please call [phone] for instruction.

## 2020-11-04 LAB
SARS-COV-2: NOT DETECTED
SOURCE: NORMAL

## 2020-11-06 ENCOUNTER — ANESTHESIA EVENT (OUTPATIENT)
Dept: ENDOSCOPY | Age: 60
End: 2020-11-06
Payer: COMMERCIAL

## 2020-11-06 ENCOUNTER — APPOINTMENT (OUTPATIENT)
Dept: GENERAL RADIOLOGY | Age: 60
End: 2020-11-06
Attending: SURGERY
Payer: COMMERCIAL

## 2020-11-06 ENCOUNTER — HOSPITAL ENCOUNTER (OUTPATIENT)
Age: 60
Setting detail: OUTPATIENT SURGERY
Discharge: HOME | End: 2020-11-06
Attending: SURGERY | Admitting: SURGERY
Payer: COMMERCIAL

## 2020-11-06 ENCOUNTER — ANESTHESIA (OUTPATIENT)
Dept: ENDOSCOPY | Age: 60
End: 2020-11-06
Payer: COMMERCIAL

## 2020-11-06 VITALS
BODY MASS INDEX: 27.29 KG/M2 | HEART RATE: 76 BPM | RESPIRATION RATE: 16 BRPM | WEIGHT: 139 LBS | DIASTOLIC BLOOD PRESSURE: 72 MMHG | OXYGEN SATURATION: 98 % | TEMPERATURE: 96.8 F | HEIGHT: 60 IN | SYSTOLIC BLOOD PRESSURE: 120 MMHG

## 2020-11-06 VITALS
DIASTOLIC BLOOD PRESSURE: 56 MMHG | RESPIRATION RATE: 29 BRPM | OXYGEN SATURATION: 96 % | SYSTOLIC BLOOD PRESSURE: 86 MMHG

## 2020-11-06 PROCEDURE — 7100000011 HC PHASE II RECOVERY - ADDTL 15 MIN: Performed by: SURGERY

## 2020-11-06 PROCEDURE — 3609027000 HC COLONOSCOPY: Performed by: SURGERY

## 2020-11-06 PROCEDURE — 88305 TISSUE EXAM BY PATHOLOGIST: CPT

## 2020-11-06 PROCEDURE — 6360000002 HC RX W HCPCS: Performed by: NURSE ANESTHETIST, CERTIFIED REGISTERED

## 2020-11-06 PROCEDURE — 74270 X-RAY XM COLON 1CNTRST STD: CPT

## 2020-11-06 PROCEDURE — 3700000000 HC ANESTHESIA ATTENDED CARE: Performed by: SURGERY

## 2020-11-06 PROCEDURE — 2580000003 HC RX 258: Performed by: NURSE ANESTHETIST, CERTIFIED REGISTERED

## 2020-11-06 PROCEDURE — 43239 EGD BIOPSY SINGLE/MULTIPLE: CPT | Performed by: SURGERY

## 2020-11-06 PROCEDURE — 6360000002 HC RX W HCPCS: Performed by: ANESTHESIOLOGY

## 2020-11-06 PROCEDURE — 88342 IMHCHEM/IMCYTCHM 1ST ANTB: CPT

## 2020-11-06 PROCEDURE — 3700000001 HC ADD 15 MINUTES (ANESTHESIA): Performed by: SURGERY

## 2020-11-06 PROCEDURE — 2709999900 HC NON-CHARGEABLE SUPPLY: Performed by: SURGERY

## 2020-11-06 PROCEDURE — 45378 DIAGNOSTIC COLONOSCOPY: CPT | Performed by: SURGERY

## 2020-11-06 PROCEDURE — 3609012400 HC EGD TRANSORAL BIOPSY SINGLE/MULTIPLE: Performed by: SURGERY

## 2020-11-06 PROCEDURE — 7100000010 HC PHASE II RECOVERY - FIRST 15 MIN: Performed by: SURGERY

## 2020-11-06 RX ORDER — PROPOFOL 10 MG/ML
INJECTION, EMULSION INTRAVENOUS PRN
Status: DISCONTINUED | OUTPATIENT
Start: 2020-11-06 | End: 2020-11-06 | Stop reason: SDUPTHER

## 2020-11-06 RX ORDER — ONDANSETRON 2 MG/ML
4 INJECTION INTRAMUSCULAR; INTRAVENOUS ONCE
Status: COMPLETED | OUTPATIENT
Start: 2020-11-06 | End: 2020-11-06

## 2020-11-06 RX ORDER — SODIUM CHLORIDE 9 MG/ML
INJECTION, SOLUTION INTRAVENOUS CONTINUOUS PRN
Status: DISCONTINUED | OUTPATIENT
Start: 2020-11-06 | End: 2020-11-06 | Stop reason: SDUPTHER

## 2020-11-06 RX ADMIN — ONDANSETRON 4 MG: 2 INJECTION INTRAMUSCULAR; INTRAVENOUS at 10:14

## 2020-11-06 RX ADMIN — SODIUM CHLORIDE: 9 INJECTION, SOLUTION INTRAVENOUS at 10:37

## 2020-11-06 RX ADMIN — PROPOFOL 90 MG: 10 INJECTION, EMULSION INTRAVENOUS at 10:40

## 2020-11-06 RX ADMIN — PROPOFOL 40 MG: 10 INJECTION, EMULSION INTRAVENOUS at 10:45

## 2020-11-06 RX ADMIN — PROPOFOL 50 MG: 10 INJECTION, EMULSION INTRAVENOUS at 10:52

## 2020-11-06 RX ADMIN — PROPOFOL 30 MG: 10 INJECTION, EMULSION INTRAVENOUS at 10:54

## 2020-11-06 ASSESSMENT — PAIN - FUNCTIONAL ASSESSMENT: PAIN_FUNCTIONAL_ASSESSMENT: 0-10

## 2020-11-06 ASSESSMENT — LIFESTYLE VARIABLES: SMOKING_STATUS: 1

## 2020-11-06 NOTE — H&P
Patient's office history and physical was reviewed. Patient examined. There has been no change in the patient's history and physical.      Physician Signature: Electronically signed by Dr. Dao Godinez Surgery History and Physical    Patient's Name/Date of Birth: Elias Mohan / 1960    Date: 10/15/2020    PCP: Torsten Merrill MD    Referring Physician:   Kemar Sauer  [phone]    CHIEF COMPLAINT:    No chief complaint on file. HISTORY OF PRESENT ILLNESS:    Elias Mohan is an 56 y.o. female who presents for a colonoscopy. The patient is anemic. She has been going to the [facility]. She said she has been getting iron infusions. She had a positive hemoccult. She said said she is fatigued. No nausea, vomiting. No changes in stool caliber. No black stools. No abdominal pain. No unintentional weight loss. No family history of colon cancer. The patient has a known history of: colon polyps. The patient has had a colonoscopy before - she had one two years ago and I removed polyps at that time. She said her hemorrhoids flare with certain foods. She said she goes back and forth between diarrhea and constipation.     Past Medical History:   Past Medical History:   Diagnosis Date    Anemia     follows with [facility] / per patient her last counts were good / see Dr. Rebeka Gonzalez notes in epic dated 10/8/2020    Arthritis     Blood in stool     Cancer ([facility] [address] 2015    breast right, treated with surgery and radiation and po medication    Dry eye syndrome     GERD (gastroesophageal reflux disease)     Hyperlipidemia     Hypertension     Osteoporosis     PONV (postoperative nausea and vomiting)         Past Surgical History:   Past Surgical History:   Procedure Laterality Date    APPENDECTOMY      CARDIOVASCULAR STRESS TEST      CHOLECYSTECTOMY  1984    COLONOSCOPY  09/17/2018    COLONOSCOPY N/A 9/17/2018    COLONOSCOPY POLYPECTOMY SNARE/COLD BIOPSY performed by Meri Holt MD at [address] [place] [address] [place], COLON, DIAGNOSTIC      HERNIA REPAIR  [id number]    abdominal    HYSTERECTOMY  2009    MASTECTOMY Right 2015    simple, blue dye, with right axillary sentinel lymph node excision    UPPER GASTROINTESTINAL ENDOSCOPY  04/03/2017        Allergies: Codeine     Medications:   No current facility-administered medications for this encounter. Social History:   Social History     Tobacco Use    Smoking status: Current Every Day Smoker     Packs/day: 0.50     Years: 41.00     Pack years: 20.50     Types: Cigarettes    Smokeless tobacco: Never Used   Substance Use Topics    Alcohol use: No     Alcohol/week: 0.0 standard drinks        Family History:   Family History   Problem Relation Age of Onset    High Blood Pressure Mother     Asthma Mother     Breast Cancer Mother     Cancer Mother          breast cancer    Heart Disease Father     High Blood Pressure Sister     High Blood Pressure Sister     Cancer Maternal Aunt         breast cancer    Cancer Other         Maternal Aunt Esophageal cancer       REVIEW OF SYSTEMS:    Constitutional: negative  Eyes: negative  Ears, nose, mouth, throat, and face: negative  Respiratory: negative  Cardiovascular: negative  Gastrointestinal: as in HPI  Genitourinary:negative  Integument/breast: negative  Hematologic/lymphatic: negative  Musculoskeletal:negative  Neurological: negative  Allergic/Immunologic: negative    PHYSICAL EXAM   /70   Pulse 91   Temp 96.8 °F (36 °C)   Resp 16   Ht 5' (1.524 m)   Wt 139 lb (63 kg)   SpO2 (!) 6%   BMI 27.15 kg/m²     General appearance: alert, cooperative and in no acute distress. Eyes: Grossly normal   Lungs: clear to auscultation bilaterally  Heart: regular rate and rhythm  Abdomen:  soft, non-tender; bowel sounds normal; no masses,  no organomegaly  Skin: No skin abnormalities  Neurologic: Alert and oriented x 3.  Grossly normal  Musculoskeletal: No clubbing cyanosis or edema. ASSESSMENT AND PLAN:       Assessment: Mykel Godinez is an 56 y.o. female who presents for for an EGD, colonoscopy with iron deficiency anemia, blood in stool, diarrhea and constipation    Plan: I will set the patient up for a colonoscopy, possible biopsy, possible polypectomy. I explained the risks including but not limited to bleeding, perforation leading to possible surgery, or infection. The benefits, alternatives, and potential complications associated with the above procedure to be performed and transfusions when applicable with the patient/responsible person prior to the procedure. I discussed the risk of bowel peroration, postoperative bleeding, post-polypectomy syndrome, as well as the possibility of needing emergency surgery or another colonoscopy. All of the patient's questions were answered. The patient understands and agrees to the procedure. Will examine her hemorrhoids at the time of her scope.       Physician Signature: Electronically signed by Elvi Tobar MD, General Surgery    Send copy of H&P to PCP, Yo Persaud MD and referring physician, Kathie Nunez,*

## 2020-11-06 NOTE — OP NOTE
Operative Note: EGD and Colonoscopy    Georgia Petersen     DATE OF PROCEDURE: 11/6/2020  SURGEON: Dr. Vick Cintron M.D. PREOPERATIVE DIAGNOSES:   Anemia       POSTOPERATIVE DIAGNOSES:   Small ulceration, gastric cardia at fundoplication site  Mild gastritis    Tortuous sigmoid colon, inability to complete scope    OPERATION:    EGD esophagogastroduodenoscopy With antral biopsies                Colonoscopy to 20 cm    SPECIMENS:  ID Type Source Tests Collected by Time Destination   A : antral bxs Tissue Stomach SURGICAL PATHOLOGY Shady Oakley MD 11/6/2020 1041        BLOOD LOSS: Minimal    ANESTHESIA: LMAC    CONSENT AND INDICATIONS:  This is a 56y.o. year old female who is having the above issues. I have discussed with the patient and/or the patient representative the indication, alternatives, and the possible risks and/or complications of the planned procedure and the anesthesia methods. The patient and/or patient representative appear to understand and agree to proceed. OPERATIONS: The patient was placed on the table and sedated via LMAC. Bite block was placed. A lubricated scope was easily passed into the upper esophagus which looked normal. The distal esophagus looked normal. The gastroesophageal junction was at 40 cm. The scope was passed into the stomach and retroflexed. There was a small ulceration in the gastric cardia at the area where the fundoplication was done. There was no hiatal hernia. The scope was passed down toward the pylorus. The antral mucosa all looked abnormal: gastritis. Biopsy was taken to check for H. pylori. The scope was then passed through the pylorus into the duodenal bulb which looked normal, then around to the distal duodenum which looked normal, and the scope was then withdrawn. The patient was then placed in left lateral decubitus position. A rectal exam was done and no masses were felt.   A lubricated scope was passed into the rectum which looked normal.  The scope was passed to 20 cm but there was a sharp turn that could not be traversed despite several maneuvers including placing the patient on her back. The mucosa also appeared edematous. The scope was then withdrawn. The patient tolerated the procedure well. PLAN: Follow up biopsies. Barium enema today. Repeat colonoscopy pending barium enema results.     Physician Signature: Electronically signed by Dr. Dawood Melgoza copy of H&P to PCP, Yared Colindres MD and referring physician, Lindsay Campo,*

## 2020-11-06 NOTE — ANESTHESIA PRE PROCEDURE
Department of Anesthesiology  Preprocedure Note       Name:  Mykel Godinez   Age:  56 y.o.  :  1960                                          MRN:  [id number]         Date:  2020      Surgeon: Arabella Douglas):  Elvi Tobar MD    Procedure: Procedure(s):  EGD ESOPHAGOGASTRODUODENOSCOPY  COLONOSCOPY DIAGNOSTIC    Medications prior to admission:   Prior to Admission medications    Medication Sig Start Date End Date Taking? Authorizing Provider   acetaminophen (TYLENOL) 325 MG tablet Take 650 mg by mouth every 6 hours as needed for Pain   Yes Historical Provider, MD   Cholecalciferol (VITAMIN D3 PO) Take 2,000 Units by mouth daily   Yes Historical Provider, MD   lisinopril (PRINIVIL;ZESTRIL) 5 MG tablet take 1 tablet by mouth once daily 20  Yes Riddhi Marc MD   omeprazole (PRILOSEC) 20 MG delayed release capsule take 1 capsule by mouth once daily WITH BREAKFAST 20  Yes Riddhi Marc MD   ondansetron (ZOFRAN ODT) 4 MG disintegrating tablet Take 1 tablet by mouth every 8 hours as needed for Nausea or Vomiting (Nausea or Dry heaves) 10/24/19  Yes Riddhi Marc MD   RESTASIS 0.05 % ophthalmic emulsion instill 1 drop into both eyes twice a day 19  Yes Historical Provider, MD   Calcium Carbonate-Vit D-Min (CALCIUM 1200 PO) Take 1 tablet by mouth 2 times daily    Yes Historical Provider, MD   anastrozole (ARIMIDEX) 1 MG tablet Take 1 mg by mouth nightly    Yes Historical Provider, MD   pramoxine HCl 1 % foam Apply perianally twice daily for hemorrhoid symptoms. 20   Riddhi Marc MD   hydrOXYzine (VISTARIL) 25 MG capsule Take 1 capsule by mouth 2 times daily 19   Riddhi Marc MD   fluticasone Teodora Grandchild) 50 MCG/ACT nasal spray 2 sprays by Nasal route daily 17   Domi Isaacs DO       Current medications:    No current facility-administered medications for this encounter. Allergies:     Allergies   Allergen 118/62       NPO Status:                                                                                 BMI:   Wt Readings from Last 3 Encounters:   11/02/20 139 lb (63 kg)   10/15/20 139 lb 3.2 oz (63.1 kg)   09/25/20 141 lb (64 kg)     Body mass index is 27.15 kg/m². CBC:   Lab Results   Component Value Date    WBC 8.8 10/25/2019    RBC 4.75 10/25/2019    HGB 11.8 10/25/2019    HCT 39.4 10/25/2019    MCV 82.9 10/25/2019    RDW 17.5 10/25/2019     10/25/2019       CMP:   Lab Results   Component Value Date     07/07/2020    K 4.7 07/07/2020    CL 97 07/07/2020    CO2 20 07/07/2020    BUN 13 07/07/2020    CREATININE 1.1 07/07/2020    GFRAA >60 07/07/2020    LABGLOM 50 07/07/2020    GLUCOSE 99 07/07/2020    PROT 8.2 10/25/2019    CALCIUM 10.0 07/07/2020    BILITOT <0.2 10/25/2019    ALKPHOS 67 10/25/2019    AST 11 10/25/2019    ALT 13 10/25/2019       POC Tests: No results for input(s): POCGLU, POCNA, POCK, POCCL, POCBUN, POCHEMO, POCHCT in the last 72 hours. Coags: No results found for: PROTIME, INR, APTT    HCG (If Applicable): No results found for: PREGTESTUR, PREGSERUM, HCG, HCGQUANT     ABGs: No results found for: PHART, PO2ART, MXM2USJ, PLE7SLZ, BEART, W4QKWBFK     Type & Screen (If Applicable):  No results found for: LABABO, LABRH    Drug/Infectious Status (If Applicable):  No results found for: HIV, HEPCAB    COVID-19 Screening (If Applicable):   Lab Results   Component Value Date    COVID19 Not Detected 11/02/2020         Anesthesia Evaluation  Patient summary reviewed and Nursing notes reviewed   history of anesthetic complications: PONV. Airway: Mallampati: II  TM distance: >3 FB   Neck ROM: full  Mouth opening: > = 3 FB Dental: normal exam         Pulmonary:normal exam    (+) current smoker          Patient did not smoke on day of surgery.                  Cardiovascular:  Exercise tolerance: good (>4 METS),   (+) hypertension:,         Rhythm: regular  Rate: normal           Beta Blocker:  Not on Beta Blocker         Neuro/Psych:   Negative Neuro/Psych ROS              GI/Hepatic/Renal:   (+) GERD: well controlled, bowel prep,          ROS comment: Gi blood. Endo/Other:    (+) blood dyscrasia: anemia:., malignancy/cancer. Abdominal:           Vascular: negative vascular ROS. Anesthesia Plan      MAC     ASA 3       Induction: intravenous. Anesthetic plan and risks discussed with patient and sibling.                     Tom Rocha MD   11/6/2020

## 2020-11-06 NOTE — PROGRESS NOTES
Patient awake and alert, no complaints of pain, nausea or vomiting. Discharge instructions reviewed with patient and responsible person. Sedation form provided in instructions. Questions answered. Patient will be discharged home with responsible adult after Barium enema.

## 2020-11-09 NOTE — ANESTHESIA POSTPROCEDURE EVALUATION
Department of Anesthesiology  Postprocedure Note    Patient: Alicia Dc  MRN: [id number]  YOB: 1960  Date of evaluation: 11/8/2020  Time:  7:23 PM     Procedure Summary     Date:  11/06/20 Room / Location:  [address] / [facility]    Anesthesia Start:  0380 Anesthesia Stop:  1103    Procedures:       EGD BIOPSY (N/A )      COLONOSCOPY DIAGNOSTIC (N/A ) Diagnosis:  (ANEMIA, BLOOD IN STOOL)    Surgeon:  Murray Cervantes MD Responsible Provider:  Volodymyr Wilcox MD    Anesthesia Type:  MAC ASA Status:  3          Anesthesia Type: MAC    Jake Phase I: Jake Score: 10    Jake Phase II: Jake Score: 10    Last vitals: Reviewed and per EMR flowsheets.        Anesthesia Post Evaluation    Patient location during evaluation: PACU  Patient participation: complete - patient participated  Level of consciousness: awake and alert  Airway patency: patent  Nausea & Vomiting: no vomiting and no nausea  Complications: no  Cardiovascular status: blood pressure returned to baseline  Respiratory status: acceptable  Hydration status: euvolemic

## 2020-11-11 ENCOUNTER — TREATMENT (OUTPATIENT)
Dept: PHYSICAL THERAPY | Age: 60
End: 2020-11-11
Payer: COMMERCIAL

## 2020-11-11 ENCOUNTER — TELEPHONE (OUTPATIENT)
Dept: SURGERY | Age: 60
End: 2020-11-11

## 2020-11-11 PROCEDURE — 97110 THERAPEUTIC EXERCISES: CPT | Performed by: PHYSICAL THERAPIST

## 2020-11-11 NOTE — PROGRESS NOTES
[address] [facility]   Phone: [phone]   Fax: [phone]      Physical Therapy Daily Treatment Note    Date: 2020  Patient Name: Alicia Dc  : 1960   MRN: [id number]  [facility]   DOSx: NA  Referring Provider: Zaina Lozano MD  [address] [facility] [address], [facility]     Medical Diagnosis:   M25.561, G89.29 (ICD-10-CM) - Chronic pain of right knee     Outcome Measure:  LEFS 47/80     S: Pt reports no pain in knee today and exercises are going well. O:   Time 1630 - 1715     Visit 2 Repeat outcome measure at mid point and end. Pain 0/10     ROM 0-140      Modalities            Manual            Stretch                  Exercise      Bike 10 minutes      Heel slides      QS 30 x 5s with towel under knee HEP TE   SLR 2 x 10  HEP; flex, abd, add  TE   SAQ 2 x 10  HEP TE   Bridges  2 x 10  Added to HEP TA   LAQ      Hamstring Curl  2 x 10  YTB  TE   TG squats      TG calf raises      Step-ups - FWD      Step-ups - LAT      Step-ups - BWD        NMR To improve balance for safe community and home ambulation    Resisted walk      FWD      BKWD      lat      March      Side stepping      Retro walk      Heel to toe      A:  Tolerated well. Bridges added to [employer]; handout provided.    P: Continue with rehab plan  Adrian Gee, PT DPT, PT [id number]    Treatment Charges: Mins Units   Initial Evaluation     Re-Evaluation     Ther Exercise         TE 40  3   Manual Therapy     MT     Ther Activities        TA 5    Gait Training          GT     Neuro Re-education NR     Modalities     Non-Billable Service Time     Other     Total Time/Units 45 3

## 2020-11-11 NOTE — TELEPHONE ENCOUNTER
----- Message from Michael Ramsay MD sent at 11/11/2020 11:43 AM EST -----  Schedule office follow up if she is having problems. Place patient on colonoscopy recall list for 5 years.

## 2020-11-11 NOTE — TELEPHONE ENCOUNTER
Pt given results of egd/colon per Dr Court Monzon. Pt asked about the results of her Barium enema and if she would need a repeat colonoscopy  sometime in the future?

## 2020-11-13 ENCOUNTER — TREATMENT (OUTPATIENT)
Dept: PHYSICAL THERAPY | Age: 60
End: 2020-11-13
Payer: COMMERCIAL

## 2020-11-13 PROCEDURE — 97110 THERAPEUTIC EXERCISES: CPT | Performed by: PHYSICAL THERAPIST

## 2020-11-13 PROCEDURE — 97530 THERAPEUTIC ACTIVITIES: CPT | Performed by: PHYSICAL THERAPIST

## 2020-11-13 NOTE — PROGRESS NOTES
[address] [facility]   Phone: [phone]   Fax: [phone]      Physical Therapy Daily Treatment Note    Date: 2020  Patient Name: Yulissa Blake  : 1960   MRN: [id number]  [facility]: 2014/15   DOSx: NA  Referring Provider: Melvin Bruce MD  [address] [facility] [address], [facility]     Medical Diagnosis:   M25.561, G89.29 (ICD-10-CM) - Chronic pain of right knee     Outcome Measure:  LEFS 47/80     S: Pt reports didn't get to do all her exercises since last visit. O:   Time 1015- 1055     Visit 3 Repeat outcome measure at mid point and end. Pain 2/10     ROM 0-140      Modalities            Manual            Stretch                  Exercise      Bike 10 minutes      Heel slides      QS 30 x 5s with towel under knee HEP TE   SLR 2 x 10  HEP; flex, abd, add  TE   SAQ 2 x 10  HEP TE   Bridges  2 x 10  Added to HEP TA   LAQ      Hamstring Curl  2 x 10  YTB  TE   TG squats      calf raises 2 x 10      Step-ups - FWD      Step-ups - LAT      Sit to stands  1 x 10   TA     NMR To improve balance for safe community and home ambulation    Resisted walk      FWD      BKWD      lat      March      Side stepping      Retro walk      Heel to toe      A:  Tolerated well. Pt reports slight discomfort 1/10 sit to stands and hamstring curls. Otherwise no c/o pain with exercises.    P: Continue with rehab plan  Luis Daniel Manriquez, PT DPT, PT [id number]    Treatment Charges: Mins Units   Initial Evaluation     Re-Evaluation     Ther Exercise         TE 30 2   Manual Therapy     MT     Ther Activities        TA 10  1   Gait Training          GT     Neuro Re-education NR     Modalities     Non-Billable Service Time     Other     Total Time/Units 40 3

## 2020-11-17 ENCOUNTER — TREATMENT (OUTPATIENT)
Dept: PHYSICAL THERAPY | Age: 60
End: 2020-11-17
Payer: COMMERCIAL

## 2020-11-17 PROCEDURE — 97110 THERAPEUTIC EXERCISES: CPT | Performed by: PHYSICAL THERAPIST

## 2020-11-17 PROCEDURE — 97530 THERAPEUTIC ACTIVITIES: CPT | Performed by: PHYSICAL THERAPIST

## 2020-11-17 NOTE — PROGRESS NOTES
[address] [facility]   Phone: [phone]   Fax: [phone]      Physical Therapy Daily Treatment Note    Date: 2020  Patient Name: Suzan Cavazos  : 1960   MRN: [id number]  [facility]: 2014/15   DOSx: NA  Referring Provider: Jose Baca MD  [address] [facility] [address], [facility]     Medical Diagnosis:   M25.561, G89.29 (ICD-10-CM) - Chronic pain of right knee     Outcome Measure:  LEFS 47/80     S: Pt reports knee is achey today and attributes it to the weather. O:   Time 1020 - 1054      Visit 4 Repeat outcome measure at mid point and end. Pain 2/10     ROM 0-140      Modalities            Manual            Stretch                  Exercise      Bike 10 minutes      Heel slides      QS 30 x 5s with towel under knee HEP TE   SLR 2 x 10  HEP; flex, abd, add  TE   SAQ 2 x 10  HEP TE   Bridges  2 x 10  Added to HEP TA   LAQ      Hamstring Curl  2 x 10  YTB  TE   TG squats      calf raises 2 x 10      Step-ups - FWD      Step-ups - LAT      Sit to stands  1 x 10   TA     NMR To improve balance for safe community and home ambulation    Resisted walk      FWD      BKWD      lat      March      Side stepping      Retro walk      Heel to toe      A:  Tolerated fairly well. Pt reports quad sets are uncomfortable today. With PT pushing patella somewhat medial while pt does quad set pt reports pain relief. Pt wearing tighter fitting jeans and unable to attempt taping knee due to this. Discussed with pt to wear loose fitting pants next session and if this still causes relief will trial taping next session. Pt in agreement.    P: Continue with rehab plan  Naomi Fiore, PT DPT, PT [id number]    Treatment Charges: Mins Units   Initial Evaluation     Re-Evaluation     Ther Exercise         TE 24 1   Manual Therapy     MT     Ther Activities        TA 10  1   Gait Training          GT     Neuro Re-education NR     Modalities     Non-Billable Service Time     Other     Total Time/Units 34 2

## 2020-11-19 ENCOUNTER — TREATMENT (OUTPATIENT)
Dept: PHYSICAL THERAPY | Age: 60
End: 2020-11-19
Payer: COMMERCIAL

## 2020-11-19 PROCEDURE — 97110 THERAPEUTIC EXERCISES: CPT | Performed by: PHYSICAL THERAPIST

## 2020-11-19 NOTE — PROGRESS NOTES
[address] [facility]   Phone: [phone]   Fax: [phone]      Physical Therapy Daily Treatment Note    Date: 2020  Patient Name: Vanesa Quispe  : 1960   MRN: [id number]  [facility]: 2014/15   DOSx: NA  Referring Provider: No referring provider defined for this encounter. Medical Diagnosis:   M25.561, G89.29 (ICD-10-CM) - Chronic pain of right knee     Outcome Measure:  LEFS 47/80     S: Pt reports knee was more achey after last session but improved today. O:   Time 7684 - 0946     Visit 5 Repeat outcome measure at mid point and end. Pain 3/10     ROM 0-140      Modalities            Manual            Stretch                  Exercise      Bike 10 minutes      Heel slides      QS 30 x 5s with towel under knee HEP TE   SLR 2 x 10  HEP; flex,  add  TE   SAQ 2 x 10  HEP TE   Bridges  3 x 10  Added to HEP    LAQ      Hamstring Curl  2 x 10  YTB  TE   TG squats      calf raises 2 x 10      Step-ups - FWD      Step-ups - LAT      Sit to stands    TA     NMR To improve balance for safe community and home ambulation    Resisted walk      FWD      BKWD      lat      March      Side stepping      Retro walk      Marching on foam  1 min      Heel to toe      A:  Tolerated fairly well. Sidelying hip abduction not completed today due to pt c/o hip pain with. Pain down to a 1/10 in knee end of session.       P: Continue with rehab plan  Fani Dowling, PT DPT, PT [id number]    Treatment Charges: Mins Units   Initial Evaluation     Re-Evaluation     Ther Exercise         TE 39  3   Manual Therapy     MT     Ther Activities        TA     Gait Training          GT     Neuro Re-education NR 1    Modalities     Non-Billable Service Time     Other     Total Time/Units 40 3

## 2020-11-24 ENCOUNTER — TREATMENT (OUTPATIENT)
Dept: PHYSICAL THERAPY | Age: 60
End: 2020-11-24
Payer: COMMERCIAL

## 2020-11-24 PROCEDURE — 97110 THERAPEUTIC EXERCISES: CPT | Performed by: PHYSICAL THERAPIST

## 2020-11-24 NOTE — PROGRESS NOTES
[address] [facility]   Phone: [phone]   Fax: [phone]      Physical Therapy Daily Treatment Note    Date: 2020  Patient Name: Nai Waldron  : 1960   MRN: [id number]  [facility]: 2014/15   DOSx: NA  Referring Provider: No referring provider defined for this encounter. Medical Diagnosis:   M25.561, G89.29 (ICD-10-CM) - Chronic pain of right knee     Outcome Measure:  LEFS 47/80     S: Pt reports 2-3/10 pain today. Pt reports Friday had much higher pain; pt reports that often the day after therapy she has increased pain. This is the first time pt has mentioned this correlation. Pt with minimal complaints during previous sessions. Will lessen exercises today to see if helps pt's discomfort day after therapy session. Pt reports didn't do exercises on Friday. O:   Time 1010- 1055     Visit 6 Repeat outcome measure at mid point and end. Pain 2-3/10     ROM 0-140      Modalities            Manual            Stretch                  Exercise      Bike 10 minutes      Heel slides      QS 30 x 5s with towel under knee HEP TE   SLR 2 x 10  HEP; flex,  add  TE   SAQ with slow ecc lower 2 x 10  HEP TE   Bridges  2 x 10  Added to HEP    LAQ      Hamstring Curl  2 x 10  YTB  TE   TG squats      calf raises 2 x 10      Step-ups - FWD      Mini squats  1 x 10      Sit to stands    TA     NMR To improve balance for safe community and home ambulation    Resisted walk      FWD      BKWD      lat      March      Side stepping      Retro walk      Marching on foam       Heel to toe      A:  Tolerated well. Pt states at home does feel like at times she overdoes it such as attempting to bring her groceries all in at once because she has to go up about 20 steps. Advised pt to take multiple trips. Pt also reports gets swelling in the medial side of the knee at times. Advised to try ice; pt adamantly reports did once and it felt bad and hasn't used ice since.        P: Continue

## 2020-11-30 ENCOUNTER — TREATMENT (OUTPATIENT)
Dept: PHYSICAL THERAPY | Age: 60
End: 2020-11-30
Payer: COMMERCIAL

## 2020-11-30 PROCEDURE — 97530 THERAPEUTIC ACTIVITIES: CPT | Performed by: PHYSICAL THERAPIST

## 2020-11-30 PROCEDURE — 97110 THERAPEUTIC EXERCISES: CPT | Performed by: PHYSICAL THERAPIST

## 2020-11-30 NOTE — PROGRESS NOTES
[address] [facility]   Phone: [phone]   Fax: [phone]      Physical Therapy Daily Treatment Note    Date: 2020  Patient Name: Catherine Roland  : 1960   MRN: [id number]  [facility]   DOSx: NA  Referring Provider: No referring provider defined for this encounter. Medical Diagnosis:   M25.561, G89.29 (ICD-10-CM) - Chronic pain of right knee     Outcome Measure:  LEFS 47/80     S: Pt reports no pain today and that she was happy she didn't have any pain after last session. Pt reports no pain going up /down steps recently which is new for her. O:   Time 1012- 1053      Visit 7  Repeat outcome measure at mid point and end. Pain 0 /10     ROM 0-140      Modalities            Manual            Stretch                  Exercise      Bike 10 minutes      Heel slides      QS 30 x 5s with towel under knee HEP TE   SLR 2 x 10  HEP; flex,  add  TE   SAQ with slow ecc lower 2 x 10  HEP TE   Bridges  2 x 10  Added to HEP    LAQ      Hamstring Curl  2 x 10  BTB  TE   TG squats      calf raises 2 x 10      Step-ups - FWD      Mini squats  2 x 10      Sit to stands    TA     NMR To improve balance for safe community and home ambulation    Resisted walk       1 x5  30#, fwd, backward TA   March      Side stepping      Retro walk      Marching on foam       Heel to toe      A:  Tolerated well. Pt reports no pain end of session. P: Continue with rehab plan to pt tolerance.   Tania Kelly, PT DPT, [place] [id number]    Treatment Charges: Mins Units   Initial Evaluation     Re-Evaluation     Ther Exercise         TE 31 2   Manual Therapy     MT     Ther Activities        TA 10 1   Gait Training          GT     Neuro Re-education NR     Modalities     Non-Billable Service Time     Other     Total Time/Units 41 3

## 2020-12-03 ENCOUNTER — TREATMENT (OUTPATIENT)
Dept: PHYSICAL THERAPY | Age: 60
End: 2020-12-03
Payer: COMMERCIAL

## 2020-12-03 PROCEDURE — 97530 THERAPEUTIC ACTIVITIES: CPT | Performed by: PHYSICAL THERAPIST

## 2020-12-03 PROCEDURE — 97110 THERAPEUTIC EXERCISES: CPT | Performed by: PHYSICAL THERAPIST

## 2020-12-03 NOTE — PROGRESS NOTES
[address] [facility]   Phone: [phone]   Fax: [phone]      Physical Therapy Daily Treatment Note    Date: 12/3/2020  Patient Name: Elias Mohan  : 1960   MRN: [id number]  [facility]: 2014/15   DOSx: NA  Referring Provider: No referring provider defined for this encounter. Medical Diagnosis:   M25.561, G89.29 (ICD-10-CM) - Chronic pain of right knee     Outcome Measure:  LEFS 47/80     S: Pt reports 1-2/10 pain. Pt reports feels she is able to go up/down steps easier at home and didn't have pain after last therapy session. Pt reports feels pain today is from the weather. O:   Time 1016- 1055      Visit 8 Repeat outcome measure at mid point and end. Pain 1-2 /10     ROM 0-140      Modalities            Manual            Stretch                  Exercise      Bike 10 minutes      Heel slides      QS 30 x 5s with towel under knee HEP TE   SLR 2 x 10  HEP; flex,  add  TE   SAQ with slow ecc lower 3 x 10  HEP TE   Bridges  2 x 10  Added to HEP    LAQ      Hamstring Curl  3 x 10  BTB  TE   TG squats      calf raises 2 x 10      Step-ups - FWD      Mini squats       Sit to stands  2 x 10   TA     NMR To improve balance for safe community and home ambulation    Resisted walk       1 x5  40#, fwd, backward TA   March      Side stepping      Retro walk      Marching on foam       Heel to toe      A:  Tolerated well. Pt again reports no pain end of session. Pt also again reports was able to go up/down steps at home with ease; reports neighbor thought she was doing exercise on the steps. Pt doing well. P: Continue with rehab plan.   Adoleri Erm, PT DPT, [place] [id number]    Treatment Charges: Mins Units   Initial Evaluation     Re-Evaluation     Ther Exercise         TE 29 2   Manual Therapy     MT     Ther Activities        TA 10 1   Gait Training          GT     Neuro Re-education NR     Modalities     Non-Billable Service Time     Other     Total Time/Units 39  3

## 2020-12-07 ENCOUNTER — TREATMENT (OUTPATIENT)
Dept: PHYSICAL THERAPY | Age: 60
End: 2020-12-07
Payer: COMMERCIAL

## 2020-12-07 PROCEDURE — 97530 THERAPEUTIC ACTIVITIES: CPT | Performed by: PHYSICAL THERAPIST

## 2020-12-07 PROCEDURE — 97110 THERAPEUTIC EXERCISES: CPT | Performed by: PHYSICAL THERAPIST

## 2020-12-14 ENCOUNTER — TREATMENT (OUTPATIENT)
Dept: PHYSICAL THERAPY | Age: 60
End: 2020-12-14
Payer: COMMERCIAL

## 2020-12-14 PROCEDURE — 97164 PT RE-EVAL EST PLAN CARE: CPT | Performed by: PHYSICAL THERAPIST

## 2020-12-14 PROCEDURE — 97110 THERAPEUTIC EXERCISES: CPT | Performed by: PHYSICAL THERAPIST

## 2020-12-14 NOTE — PROGRESS NOTES
[address] [facility]   Phone: [phone]   Fax: [phone]        Referring Provider:     MD Bobby [address] [facility] [address]       Medical Diagnosis:   M25.561, P88.81 (ICD-10-CM) - Chronic pain of right knee     CERTIFICATION PERIOD:  10/26/2020  to 12/11/2020. ATTENDANCE:  Patient has attended 10 of 12 scheduled treatments from 10/26/2020  to 12/14/2020. TREATMENTS RECEIVED:  Therapeutic exercise, therapeutic activity, neuromuscular reeducation. INITIAL STATUS:  Observations: well nourished female     Inspection: normal orthopedic exam        Gait: normal     Joint/Motion:     Knee:  Right:   [id number]° Flexion,  0° Extension     Left:   AROM:140° Flexion,  0° Extension        Strength:     Knee:   Right: Flexion 4/5,  Extension 4/5  Left: Flexion 5/5,  Extension 5/5     Palpation: Tender to palpation medial joint line     Special Tests/Functional Screens:    []? Lachman's []?+ / [x]? -    []? Anterior Drawer []?+ / [x]? -   []? Valgus Stress []?+ / [x]? -  []? Thessaly Test []?+ / [x]? -   []? Tessie's Sign []?+ / []? -   []? Other: []?+ / []? - []? Bounce Home []?+ / []? -   []? Sumi [x]?+ / []? -    []? Pivot Shift []?+ / []? -   []? Posterior Drawer []?+ / []? -   []? Varus Stress []?+ / [x]? -                 CURRENT STATUS:    Observations: well nourished female     Inspection: normal orthopedic exam        Gait: normal     Joint/Motion:     Knee:  Right:   [id number]° Flexion,  0° Extension     Left:   AROM:140° Flexion,  0° Extension        Strength:     Knee:   Right: Flexion 5/5,  Extension 5/5  Left: Flexion 5/5,  Extension 5/5     Palpation: nontender to palpation.      Special Tests/Functional Screens:    []? Lachman's []?+ / [x]? -    []? Anterior Drawer []?+ / [x]? -   []? Valgus Stress []?+ / [x]? -  []? Thessaly Test []?+ / []? -   []?  Tessie's Sign []?+ / []? - []? Other: []?+ / []? - []? Bounce Home []?+ / []? -   []? Sumi []?+ / [x]? -    []? Pivot Shift []?+ / []? -   []? Posterior Drawer []?+ / [x]? -   []? Varus Stress []?+ / [x]? -               Short Term goals (3 weeks)  · Decrease reported pain to 0-5/10 (goal met)   · Increase Strength to 4+/5 (goal met)  · Able to perform/complete the following functions/tasks: pt able to go up/down 5 steps with rail with minor pain/limitation. Pt able to walk 20 minutes with minor pain/limitation. Pt able to perform 10 sit to stands with UE support with minor pain/limitation.  (goal met)  · Lower Extremity Functional Scale (LEFS) 55/80  impairment (not met)      Long Term goals (6 weeks)  · Decrease reported pain to 0-3/10 (goal met)   · Increase Strength to 5/5 (goal met)  · Able to perform/complete the following functions/tasks: pt able to go up/down flight of steps with no pain/limitation. Pt able to walk 30 minutes with no pain/limitation.   Pt able to perform 10 sit to stands with no UE support with no pain/limitation. (partial goal met)   · LEFS 60/80  impairment (not met)   · Independent with Home Exercise Programs    OUTCOME MEASURE:  LEFS 47/80     COMMENTS AND RECOMMENDATIONS: Pt reports able to take less ibuprofen than she was previously for her knee. Pt reports since starting therapy has not felt like her knee would give out. Pt does report frustration about knee continuing to feel 'achy'. Pt reports without thinking about it she is able to walk reciprocally up/down steps. Reports that her neighbors notice that she is able to move more easily. Pt had temporary setback with mobility due to short term back pain but reports no back pain today. Pt reports able to go up/down flight of steps and walk 30 minutes but with knee feeling achy. When pt first started therapy initially reported had to rest entire following day after therapy sessions. Pt no longer does this and completes HEP regularly per pt only occasionally not doing due to feeling achy. Pt reports feels the exercises are helping and some make her knee feel good when she has pain. Recommend pt continue with HEP at home. Discussed with pt may call with any questions. Pt reports frustration that she isn't completely painfree and reports follows up with PCP in January. Pt has made great gains during therapy; recommend pt continue with exercises independently at home at this time and followup with PCP with further concerns in January if still experiencing pain or beforehand if pt has concerns. Thank you for the opportunity to work with your patient.      Sabra De La Rosa, PT DPT [id number]      ________________________                _______________  Physician     Date

## 2020-12-14 NOTE — PROGRESS NOTES
[address] [facility]   Phone: [phone]   Fax: [phone]      Physical Therapy Daily Treatment Note    Date: 2020  Patient Name: Yvette SIEGEL: 1960   MRN: [id number]  [facility]   DOSx: NA  Referring Provider: No referring provider defined for this encounter. Medical Diagnosis:   M25.561, G89.29 (ICD-10-CM) - Chronic pain of right knee     Outcome Measure:  LEFS 47/80     S: Pt reports 2/10 achey pain in knee. Pt reports no back pain today. O:   Time 1010- 1053     Visit 10  Repeat outcome measure at mid point and end. Pain 2/10 pain 'achy' knee. ROM 0-140      Modalities            Manual            Stretch                  Exercise      Bike 10 minutes      Heel slides      QS 40 x 5s with towel under knee HEP TE   SLR 4 x 10  HEP; flex,   TE   SAQ with slow ecc lower 3 x 10  HEP TE   Bridges  2 x 10  Added to HEP    LAQ      Hamstring Curl  3 x 10  BTB  TE   TG squats      calf raises 2 x 10      Step-ups - FWD 8 inch step  TA   Mini squats      Sit to stands   TA     To improve balance for safe community and home ambulation    Resisted walk       50# cable system = 12.5#, fwd, backward TA   March      Side stepping      Retro walk      Marching on foam       Heel to toe      A:  Tolerated well. Reassessment completed today. See note for details. Pt reports doesn't need a handout to continue HEP at home; feels comfortable already doing them independently but request T band to do hamstring curls; blue theraband provided. P: Will discharge pt at this time.     Juanjose Martinez, PT DPT, [address] [facility] [id number]    Treatment Charges: Mins Units   Initial Evaluation     Re-Evaluation 15 1   Ther Exercise         TE 28 2   Manual Therapy     MT     Ther Activities        TA     Gait Training          GT     Neuro Re-education NR     Modalities     Non-Billable Service Time     Other     Total Time/Units 43 3

## 2021-01-25 ENCOUNTER — OFFICE VISIT (OUTPATIENT)
Dept: FAMILY MEDICINE CLINIC | Age: 61
End: 2021-01-25
Payer: COMMERCIAL

## 2021-01-25 VITALS
SYSTOLIC BLOOD PRESSURE: 136 MMHG | WEIGHT: 143 LBS | HEART RATE: 84 BPM | HEIGHT: 60 IN | DIASTOLIC BLOOD PRESSURE: 80 MMHG | OXYGEN SATURATION: 98 % | BODY MASS INDEX: 28.07 KG/M2 | TEMPERATURE: 96.8 F

## 2021-01-25 DIAGNOSIS — Z13.31 POSITIVE DEPRESSION SCREENING: ICD-10-CM

## 2021-01-25 DIAGNOSIS — Z17.0 MALIGNANT NEOPLASM OF RIGHT BREAST IN FEMALE, ESTROGEN RECEPTOR POSITIVE, UNSPECIFIED SITE OF BREAST (HCC): ICD-10-CM

## 2021-01-25 DIAGNOSIS — R31.29 MICROSCOPIC HEMATURIA: ICD-10-CM

## 2021-01-25 DIAGNOSIS — R35.0 URINARY FREQUENCY: Primary | ICD-10-CM

## 2021-01-25 DIAGNOSIS — F33.0 MILD EPISODE OF RECURRENT MAJOR DEPRESSIVE DISORDER (HCC): ICD-10-CM

## 2021-01-25 DIAGNOSIS — C50.911 MALIGNANT NEOPLASM OF RIGHT BREAST IN FEMALE, ESTROGEN RECEPTOR POSITIVE, UNSPECIFIED SITE OF BREAST (HCC): ICD-10-CM

## 2021-01-25 LAB
APPEARANCE FLUID: CLEAR
BILIRUBIN, POC: NORMAL
BLOOD URINE, POC: NORMAL
CLARITY, POC: CLEAR
COLOR, POC: NORMAL
GLUCOSE URINE, POC: NORMAL
KETONES, POC: NORMAL
LEUKOCYTE EST, POC: NORMAL
NITRITE, POC: NORMAL
PH, POC: 5.5
PROTEIN, POC: NORMAL
SPECIFIC GRAVITY, POC: 1.01
UROBILINOGEN, POC: 0.2

## 2021-01-25 PROCEDURE — 99214 OFFICE O/P EST MOD 30 MIN: CPT | Performed by: FAMILY MEDICINE

## 2021-01-25 PROCEDURE — 81002 URINALYSIS NONAUTO W/O SCOPE: CPT | Performed by: FAMILY MEDICINE

## 2021-01-25 PROCEDURE — 3017F COLORECTAL CA SCREEN DOC REV: CPT | Performed by: FAMILY MEDICINE

## 2021-01-25 PROCEDURE — G8482 FLU IMMUNIZE ORDER/ADMIN: HCPCS | Performed by: FAMILY MEDICINE

## 2021-01-25 PROCEDURE — G8427 DOCREV CUR MEDS BY ELIG CLIN: HCPCS | Performed by: FAMILY MEDICINE

## 2021-01-25 PROCEDURE — G8417 CALC BMI ABV UP PARAM F/U: HCPCS | Performed by: FAMILY MEDICINE

## 2021-01-25 PROCEDURE — G8431 POS CLIN DEPRES SCRN F/U DOC: HCPCS | Performed by: FAMILY MEDICINE

## 2021-01-25 PROCEDURE — 4004F PT TOBACCO SCREEN RCVD TLK: CPT | Performed by: FAMILY MEDICINE

## 2021-01-25 ASSESSMENT — PATIENT HEALTH QUESTIONNAIRE - PHQ9
10. IF YOU CHECKED OFF ANY PROBLEMS, HOW DIFFICULT HAVE THESE PROBLEMS MADE IT FOR YOU TO DO YOUR WORK, TAKE CARE OF THINGS AT HOME, OR GET ALONG WITH OTHER PEOPLE: 0
SUM OF ALL RESPONSES TO PHQ9 QUESTIONS 1 & 2: 6
4. FEELING TIRED OR HAVING LITTLE ENERGY: 3
1. LITTLE INTEREST OR PLEASURE IN DOING THINGS: 3
SUM OF ALL RESPONSES TO PHQ QUESTIONS 1-9: 12
SUM OF ALL RESPONSES TO PHQ QUESTIONS 1-9: 12
8. MOVING OR SPEAKING SO SLOWLY THAT OTHER PEOPLE COULD HAVE NOTICED. OR THE OPPOSITE, BEING SO FIGETY OR RESTLESS THAT YOU HAVE BEEN MOVING AROUND A LOT MORE THAN USUAL: 0
5. POOR APPETITE OR OVEREATING: 0

## 2021-01-25 NOTE — PATIENT INSTRUCTIONS
[facility] Office  R Rosa Schaeffer 8  [place], [address] [place] [address] 6  6969 43 Gretta Orourke. [place], [address]  [phone]      [facility] Office:  Phone: [phone]  Fax: Kathy Hickman 82 Office:   Phone: [phone]  Fax: 43 Aaliyah Tidwell  [address]  [place], [facility] [place] 210  Phone: [address] [place] [address] [place]  [address]  ANTHONY LI [facility], [address] [place] [address]  Phone: MyQuoteApp [phone]  [address]  [facility], [facility] [address]  Phone: [phone]    Please call to schedule an appointment. Patient Education        Back Stretches: Exercises  Introduction  Here are some examples of exercises for stretching your back. Start each exercise slowly. Ease off the exercise if you start to have pain. Your doctor or physical therapist will tell you when you can start these exercises and which ones will work best for you. How to do the exercises  Overhead stretch   1. Stand comfortably with your feet shoulder-width apart. 2. Looking straight ahead, raise both arms over your head and reach toward the ceiling. Do not allow your head to tilt back. 3. Hold for 15 to 30 seconds, then lower your arms to your sides. 4. Repeat 2 to 4 times. Side stretch   1. Stand comfortably with your feet shoulder-width apart. 2. Raise one arm over your head, and then lean to the other side. 3. Slide your hand down your leg as you let the weight of your arm gently stretch your side muscles. Hold for 15 to 30 seconds. 4. Repeat 2 to 4 times on each side. Press-up   1. Lie on your stomach, supporting your body with your forearms. 2. Press your elbows down into the floor to raise your upper back. As you do this, relax your stomach muscles and allow your back to arch without using your back muscles. As your press up, do not let your hips or pelvis come off the floor. 3. Hold for 15 to 30 seconds, then relax. 4. Repeat 2 to 4 times. Relax and rest   1. Lie on your back with a rolled towel under your neck and a pillow under your knees. Extend your arms comfortably to your sides. 2. Relax and breathe normally. 3. Remain in this position for about 10 minutes. 4. If you can, do this 2 or 3 times each day. Follow-up care is a key part of your treatment and safety. Be sure to make and go to all appointments, and call your doctor if you are having problems. It's also a good idea to know your test results and keep a list of the medicines you take. Where can you learn more? Go to https://[employer].iTwixie. org and sign in to your [employer] account. Enter G483 in the SocialPicks box to learn more about \"Back Stretches: Exercises. \"     If you do not have an account, please click on the \"Sign Up Now\" link. Current as of: March 2, 2020               Content Version: 12.6  © 6465-6390 [employer]. Care instructions adapted under license by [facility] ([facility]). If you have questions about a medical condition or this instruction, always ask your healthcare professional. [place] [address] any warranty or liability for your use of this information.

## 2021-01-25 NOTE — PROGRESS NOTES
[facility]  Office Progress Note - Dr. Lashae Bai  1/25/21    CC:   Chief Complaint   Patient presents with    Hypertension    Back Pain     Lower and mid back pain onset 1 week. Urinary frequency. HPI: mood has bene low  Fatigued. Going to bed earlier, waking in the early AM as a result. Nap during the day. Feels tearful  Feels isolated. Some health concerns. Not willing to try medicine. Has made freidnly relationship with neighbor. Do puzzles, watch tv sometimes. Hypertension  Follow-up  Blood pressure is  controlled today. BP Readings from Last 3 Encounters:   01/25/21 136/80   11/06/20 120/72   11/06/20 (!) 86/56     Patient continues medications regularly. Compliance is good. Denies CP, sob, abd pain, headaches, vision changes, dizziness, hypotensive symptoms. No side effects from medications noted. She has noted some right back pain / flank pain. muscular location / and si joint. No radiation. Has been more inactive than normal. Sitting a lot. UA with trace blood, not present on microscopic. Breast cancer  ~5 years post op this coming spring. Continues arimidex. Hopeful she can stop it. Follows with oncology. No recent problems or changes. _________________________________________________________    Assessment / David Pichardo was seen today for hypertension and back pain. Diagnoses and all orders for this visit:    Urinary frequency  -     POCT Urinalysis no Micro  No signs of infection . Positive depression screening  -     Positive Screen for Clinical Depression with a Documented Follow-up Plan   Depression worse. Microscopic hematuria  -     Urinalysis with Microscopic; Future  No blood present on microscopic exam.     Mild episode of recurrent major depressive disorder ([facility] [place] [address]  Worse lately due to pandemic isolation.  hydrOXYzine (VISTARIL) 25 MG capsule Take 1 capsule by mouth 2 times daily (Patient not taking: Reported on 1/25/2021) 60 capsule 1    fluticasone (FLONASE) 50 MCG/ACT nasal spray 2 sprays by Nasal route daily (Patient not taking: Reported on 1/25/2021) 1 Bottle 3     No current facility-administered medications on file prior to visit.         Patient Active Problem List   Diagnosis Code    Chronic otitis externa of right ear H60.61    Essential hypertension I10    Gastroesophageal reflux disease K21.9    Malignant neoplasm of right female breast ([facility] [address] C50.911    Osteoporosis M81.0    Mild episode of recurrent major depressive disorder ([facility] [address] F33.0     _________________________________________________________  Past Medical History:   Diagnosis Date    Anemia     follows with [facility] / per patient her last counts were good / see Dr. Araiza Blend notes in epic dated 10/8/2020    Arthritis     Blood in stool     Cancer ([facility] [address] 2015    breast right, treated with surgery and radiation and po medication    Dry eye syndrome     GERD (gastroesophageal reflux disease)     Hyperlipidemia     Hypertension     Osteoporosis     PONV (postoperative nausea and vomiting)        Family History   Problem Relation Age of Onset    High Blood Pressure Mother     Asthma Mother     Breast Cancer Mother     Cancer Mother          breast cancer    Heart Disease Father     High Blood Pressure Sister     High Blood Pressure Sister     Cancer Maternal Aunt         breast cancer    Cancer Other         Maternal Aunt Esophageal cancer       Past Surgical History:   Procedure Laterality Date    APPENDECTOMY      CARDIOVASCULAR STRESS TEST      CHOLECYSTECTOMY  1984    COLONOSCOPY  09/17/2018    COLONOSCOPY N/A 9/17/2018    COLONOSCOPY POLYPECTOMY SNARE/COLD BIOPSY performed by Alcon Flores MD at [address] [facility] COLONOSCOPY N/A 11/6/2020 No murmur. No gallop and no friction rub. Pulmonary/Chest:    Effort normal and breath sounds normal.    No wheezes. No rales or rhonchi. Abdominal:    Soft. Bowel sounds are normal.    No distension. No tenderness. Musculoskeletal:    Normal range of motion. No joint swelling noted. No peripheral edema. Neurological:    She is A&Ox3    Motor and sensation grossly intact. Normal Gait. Skin:    Skin is warm and dry. No rashes, No lesions. Psychiatric:    She has a normal mood and affect. Normal groom and dress. No SI or HI.   ________________________________________________________    This note may have been created using dictation software.  Efforts were made to reduce grammatical or syntax errors, but some may persist.

## 2021-01-26 LAB
BACTERIA: ABNORMAL /HPF
BILIRUBIN URINE: NEGATIVE
BLOOD, URINE: ABNORMAL
CLARITY: CLEAR
COLOR: YELLOW
EPITHELIAL CELLS, UA: ABNORMAL /HPF
GLUCOSE URINE: NEGATIVE MG/DL
KETONES, URINE: NEGATIVE MG/DL
LEUKOCYTE ESTERASE, URINE: ABNORMAL
NITRITE, URINE: NEGATIVE
PH UA: 6 (ref 5–9)
PROTEIN UA: NEGATIVE MG/DL
RBC UA: ABNORMAL /HPF (ref 0–2)
SPECIFIC GRAVITY UA: 1.01 (ref 1–1.03)
UROBILINOGEN, URINE: 0.2 E.U./DL
WBC UA: ABNORMAL /HPF (ref 0–5)

## 2021-03-27 ENCOUNTER — HOSPITAL ENCOUNTER (OUTPATIENT)
Dept: CT IMAGING | Age: 61
Discharge: HOME | End: 2021-03-29
Payer: COMMERCIAL

## 2021-03-27 ENCOUNTER — HOSPITAL ENCOUNTER (OUTPATIENT)
Age: 61
Discharge: HOME | End: 2021-03-27
Payer: COMMERCIAL

## 2021-03-27 DIAGNOSIS — C50.211 PRIMARY CANCER OF UPPER INNER QUADRANT OF RIGHT FEMALE BREAST (HCC): ICD-10-CM

## 2021-03-27 LAB
BUN BLDV-MCNC: 14 MG/DL (ref 8–23)
CREAT SERPL-MCNC: 1 MG/DL (ref 0.5–1)
GFR AFRICAN AMERICAN: >60
GFR NON-AFRICAN AMERICAN: 56 ML/MIN/1.73

## 2021-03-27 PROCEDURE — 6360000004 HC RX CONTRAST MEDICATION: Performed by: RADIOLOGY

## 2021-03-27 PROCEDURE — 36415 COLL VENOUS BLD VENIPUNCTURE: CPT

## 2021-03-27 PROCEDURE — 84520 ASSAY OF UREA NITROGEN: CPT

## 2021-03-27 PROCEDURE — 82565 ASSAY OF CREATININE: CPT

## 2021-03-27 PROCEDURE — 71260 CT THORAX DX C+: CPT

## 2021-03-27 RX ADMIN — IOPAMIDOL 75 ML: 755 INJECTION, SOLUTION INTRAVENOUS at 12:50

## 2021-03-28 DIAGNOSIS — I10 ESSENTIAL HYPERTENSION: ICD-10-CM

## 2021-03-29 RX ORDER — OMEPRAZOLE 20 MG/1
CAPSULE, DELAYED RELEASE ORAL
Qty: 30 CAPSULE | Refills: 5 | OUTPATIENT
Start: 2021-03-29

## 2021-03-29 RX ORDER — LISINOPRIL 5 MG/1
TABLET ORAL
Qty: 30 TABLET | Refills: 5 | Status: SHIPPED
Start: 2021-03-29 | End: 2021-08-26 | Stop reason: SDUPTHER

## 2021-03-29 RX ORDER — LISINOPRIL 5 MG/1
TABLET ORAL
Qty: 30 TABLET | Refills: 5 | OUTPATIENT
Start: 2021-03-29

## 2021-03-29 RX ORDER — OMEPRAZOLE 20 MG/1
CAPSULE, DELAYED RELEASE ORAL
Qty: 30 CAPSULE | Refills: 5 | Status: SHIPPED
Start: 2021-03-29 | End: 2021-08-26 | Stop reason: SDUPTHER

## 2021-04-26 ENCOUNTER — OFFICE VISIT (OUTPATIENT)
Dept: FAMILY MEDICINE CLINIC | Age: 61
End: 2021-04-26
Payer: COMMERCIAL

## 2021-04-26 VITALS
HEART RATE: 87 BPM | BODY MASS INDEX: 28.07 KG/M2 | TEMPERATURE: 97.4 F | SYSTOLIC BLOOD PRESSURE: 136 MMHG | OXYGEN SATURATION: 98 % | WEIGHT: 143 LBS | HEIGHT: 60 IN | DIASTOLIC BLOOD PRESSURE: 88 MMHG

## 2021-04-26 DIAGNOSIS — I10 ESSENTIAL HYPERTENSION: ICD-10-CM

## 2021-04-26 DIAGNOSIS — E61.1 IRON DEFICIENCY: Primary | ICD-10-CM

## 2021-04-26 DIAGNOSIS — R21 RASH AND NONSPECIFIC SKIN ERUPTION: ICD-10-CM

## 2021-04-26 PROCEDURE — 90750 HZV VACC RECOMBINANT IM: CPT | Performed by: FAMILY MEDICINE

## 2021-04-26 PROCEDURE — 90471 IMMUNIZATION ADMIN: CPT | Performed by: FAMILY MEDICINE

## 2021-04-26 PROCEDURE — G8417 CALC BMI ABV UP PARAM F/U: HCPCS | Performed by: FAMILY MEDICINE

## 2021-04-26 PROCEDURE — 4004F PT TOBACCO SCREEN RCVD TLK: CPT | Performed by: FAMILY MEDICINE

## 2021-04-26 PROCEDURE — 99214 OFFICE O/P EST MOD 30 MIN: CPT | Performed by: FAMILY MEDICINE

## 2021-04-26 PROCEDURE — 3017F COLORECTAL CA SCREEN DOC REV: CPT | Performed by: FAMILY MEDICINE

## 2021-04-26 PROCEDURE — G8427 DOCREV CUR MEDS BY ELIG CLIN: HCPCS | Performed by: FAMILY MEDICINE

## 2021-04-26 NOTE — PROGRESS NOTES
[facility]  Office Progress Note - Dr. Diana Cline  4/26/21    CC:   Chief Complaint   Patient presents with    Hypertension   [address] told pt her Iron was low. /88 (Site: Left Upper Arm, Position: Sitting, Cuff Size: Medium Adult)   Pulse 87   Temp 97.4 °F (36.3 °C) (Temporal)   Ht 5' (1.524 m)   Wt 143 lb (64.9 kg)   SpO2 98%   BMI 27.93 kg/m²   Wt Readings from Last 3 Encounters:   04/26/21 143 lb (64.9 kg)   01/25/21 143 lb (64.9 kg)   11/06/20 139 lb (63 kg)       HPI: Hx SOLO  resolved  wa son IV feraheme for a bit and transitioned to iron  Oral for maintenance. Recent labs reviewed from [facility] and she was not anemic or microcytic. Her ferritin and iron were normal.    The iron constipates her and upsets her stomach. She has been taking an alternative iron of some sort - and it does seem to be going better. Ferritin 124 from 255, from 31 (suspect the improvement after the IV iron)    We also reviewed CA27-29 marker as she is confused about what that means and says she didn't ask her oncology team.   Her number fluctuates but is steady and not trending up or down. Recheck was normal at 34 one of her lower readings over the past year. She says she was taken off her anastrozole. Hypertension  Follow-up  Blood pressure is borderline controlled today. BP Readings from Last 3 Encounters:   04/26/21 136/88   01/25/21 136/80   11/06/20 120/72   continues low dose lisinopril. Patient continues medications regularly. Compliance is good. Denies CP, sob, abd pain, headaches, vision changes, dizziness, hypotensive symptoms. No side effects from medications noted. She has been walking more. She completed both moderna covid injections. She is going to have a dexa in the summer o see how the prolia went. Thigh rash  Last discussed in 8/2019  Persists, not worsening. tx with steroid and antifungal in past without improvement.  UPPER GASTROINTESTINAL ENDOSCOPY N/A 11/06/2020    EGD BIOPSY performed by Travon Díaz MD at [facility] ENDOSCOPY       Social History     Tobacco Use    Smoking status: Current Every Day Smoker     Packs/day: 0.50     Years: 41.00     Pack years: 20.50     Types: Cigarettes    Smokeless tobacco: Never Used   Substance Use Topics    Alcohol use: No     Alcohol/week: 0.0 standard drinks    Drug use: Never       Chart reviewed and updated where appropriate for PMH, Fam, and Soc Hx.  _________________________________________________________  ROS: POSITIVE: As in the HPI. Otherwise Pertinent negatives are negative.    __________________________________________________________  Physical Exam   Constitutional:    She is oriented to person, place, and time. She appears well-developed and well-nourished. Eyes:    Conjunctivae are normal.    Pupils are equal, round, and reactive to light. EOMI. Neck:    Normal range of motion. No thyromegaly or nodules noted. No bruit. No LAD. Cardiovascular:    Normal rate, regular rhythm and normal heart sounds. No murmur. No gallop and no friction rub. Pulmonary/Chest:    Effort normal and breath sounds normal.    No wheezes. No rales or rhonchi. Abdominal:    Soft. Bowel sounds are normal.    No distension. No tenderness. Musculoskeletal:    Normal range of motion. No joint swelling noted. No peripheral edema. Skin:    Skin is warm and dry. No rashes, No lesions. Psychiatric:    She has a normal mood and affect. Normal groom and dress. No SI or HI.   ________________________________________________________    This note may have been created using dictation software.  Efforts were made to reduce errors, but some may persist.

## 2021-05-19 DIAGNOSIS — Z12.31 VISIT FOR SCREENING MAMMOGRAM: ICD-10-CM

## 2021-05-19 DIAGNOSIS — Z79.811 AROMATASE INHIBITOR USE: Primary | ICD-10-CM

## 2021-06-07 ASSESSMENT — ENCOUNTER SYMPTOMS
DIARRHEA: 0
CONSTIPATION: 0
WHEEZING: 0
CHEST TIGHTNESS: 0
RHINORRHEA: 0
BLOOD IN STOOL: 0
BACK PAIN: 0
COUGH: 0
EYES NEGATIVE: 1
SINUS PAIN: 0
ABDOMINAL DISTENTION: 0
SINUS PRESSURE: 0
APNEA: 0
ROS SKIN COMMENTS: DENIES BREAST SKIN CHANGES, ARM SWELLING, OR PALPABLE AXILLARY OR SUPRACLAVICULAR ADENOPATHY.
TROUBLE SWALLOWING: 0
CHOKING: 0
ABDOMINAL PAIN: 0
SORE THROAT: 0
VOICE CHANGE: 0
EYE DISCHARGE: 0
VOMITING: 0
EYE ITCHING: 0
EYE PAIN: 0
SHORTNESS OF BREATH: 0
COLOR CHANGE: 0
NAUSEA: 0

## 2021-06-07 NOTE — PROGRESS NOTES
Subjective:  Stage I ER/KS positive, HER-2/luis negative ID carcinoma of the right breast.  Oncotype DX recurrence score of 5. Patient ID: Lavern Humphreys is a 59 y.o. female. HPIPatient is here for a follow up visit of her Stage I ER/KS positive, HER-2/luis negative carcinoma of the right breast.    She underwent a right simple mastectomy, right axillary sentinel lymph node excision, right axillary dissection on March 2, 2016. Pathological evaluation demonstrated:  A. 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 Tumor Size: Size of Largest Invasive Carcinoma 2.0 cm. Margins of excision are negative for invasive carcinoma. Skin and nipple, negative for invasive carcinoma  B. Right sentinel lymph nodes #1: Two lymph nodes, negative for neoplasm;  C. [place] lymph node #2: Single lymph node, negative for neoplasm;  D. Upper inner chest wall mass: Invasive ductal carcinoma involving fibrovascular and neurovascular tissue, negative for lymph node. Pathologic Staging:  Primary tumor- pT 1c  Regional lymph nodes-(sn)pN 0(i-)  Distant metastases- pM x  Additional Pathologic Findings-invasive ductal carcinoma involving soft tissue of upper/inner quadrant chest    Additional note: The invasive carcinoma present in the specimen designated as upper/inner quadrant is discontinuous from the centrally located invasive carcinoma and does not involve lymphoid tissue. Tumor involves the inked margins of excision in this specimen. Intradepartmental consultation is obtained. Case discussed with Dr. Len Morel 3/7/2016.    -06/14/2021 Recent inpatient admission for acute hypoxic respiratory failure.       Past Medical History:   Diagnosis Date    Anemia     follows with [facility] / per patient her last counts were good / see Dr. Annmarie Joseph notes in epic dated 10/8/2020    Arthritis     Blood in stool     Cancer [facility]) 2015    breast right, treated with surgery and radiation and po medication  Dry eye syndrome     GERD (gastroesophageal reflux disease)     Hyperlipidemia     Hypertension     Osteoporosis     PONV (postoperative nausea and vomiting)      Past Surgical History:   Procedure Laterality Date    APPENDECTOMY      CARDIOVASCULAR STRESS TEST      CHOLECYSTECTOMY  1984    COLONOSCOPY  09/17/2018    COLONOSCOPY N/A 09/17/2018    COLONOSCOPY POLYPECTOMY SNARE/COLD BIOPSY performed by Kwesi Farris MD at [address] [facility] COLONOSCOPY N/A 11/06/2020    COLONOSCOPY DIAGNOSTIC performed by Kwesi Farris MD at [facility], COLON, DIAGNOSTIC      HERNIA REPAIR  2009,2010    abdominal    HYSTERECTOMY, TOTAL ABDOMINAL N/A 2009    MASTECTOMY Right 2015    simple, blue dye, with right axillary sentinel lymph node excision    UPPER GASTROINTESTINAL ENDOSCOPY  04/03/2017    UPPER GASTROINTESTINAL ENDOSCOPY N/A 11/06/2020    EGD BIOPSY performed by Kwesi Farris MD at [facility] ENDOSCOPY     Current Outpatient Medications on File Prior to Visit   Medication Sig Dispense Refill    Ferrous Fumarate-Vitamin C (ALBERTO-SEQUELS PO) Take 65 mg by mouth daily      omeprazole (PRILOSEC) 20 MG delayed release capsule take 1 capsule by mouth once daily WITH BREAKFAST 30 capsule 5    lisinopril (PRINIVIL;ZESTRIL) 5 MG tablet take 1 tablet by mouth once daily 30 tablet 5    acetaminophen (TYLENOL) 325 MG tablet Take 650 mg by mouth every 6 hours as needed for Pain      Cholecalciferol (VITAMIN D3 PO) Take 2,000 Units by mouth daily      ondansetron (ZOFRAN ODT) 4 MG disintegrating tablet Take 1 tablet by mouth every 8 hours as needed for Nausea or Vomiting (Nausea or Dry heaves) 15 tablet 0     No current facility-administered medications on file prior to visit. Review of Systems   Constitutional: Negative for activity change, appetite change, chills, fatigue, fever and unexpected weight change.         Recent in-patient admission for COPD with exacerbation and acute hypoxic respiratory failure. She quit smoking on June 12; She does not have a pulmonologist; due to see primary care on Friday. HENT: Negative for congestion, postnasal drip, rhinorrhea, sinus pressure, sinus pain, sneezing, sore throat, trouble swallowing and voice change. Eyes: Negative. Negative for pain, discharge, itching and visual disturbance. Respiratory: Negative for apnea, cough, choking, chest tightness, shortness of breath and wheezing. Cardiovascular: Negative for chest pain, palpitations and leg swelling. Gastrointestinal: Negative for abdominal distention, abdominal pain, blood in stool, constipation, diarrhea, nausea and vomiting. Endocrine: Negative for cold intolerance and heat intolerance. Genitourinary: Negative for difficulty urinating, dysuria, frequency and hematuria. Musculoskeletal: Positive for myalgias. Negative for arthralgias, back pain, gait problem, joint swelling, neck pain and neck stiffness. Overall stable. She does have \"muscle spasms\" intermittently of the right chest wall. She reports they are intense, come intermittently, and resolve spontaneously. Skin: Negative for color change, pallor and rash. Denies breast skin changes, arm swelling, or palpable axillary or supraclavicular adenopathy. Allergic/Immunologic: Negative for environmental allergies and food allergies. Neurological: Negative for dizziness, seizures, syncope, speech difficulty, weakness, light-headedness and headaches. Hematological: Negative for adenopathy. Does not bruise/bleed easily. Psychiatric/Behavioral: Negative for agitation, confusion and decreased concentration. The patient is nervous/anxious. Objective:   Physical Exam  Vitals and nursing note reviewed. Constitutional:       General: She is not in acute distress. Appearance: She is well-developed. She is not diaphoretic.       Comments: ECOG remains stable at 0   HENT: Head: Normocephalic and atraumatic. Mouth/Throat:      Pharynx: No oropharyngeal exudate. Eyes:      General: No scleral icterus. Right eye: No discharge. Left eye: No discharge. Conjunctiva/sclera: Conjunctivae normal.   Neck:      Thyroid: No thyromegaly. Vascular: No JVD. Trachea: No tracheal deviation. Cardiovascular:      Rate and Rhythm: Normal rate and regular rhythm. Heart sounds: Normal heart sounds. No murmur heard. No friction rub. No gallop. Pulmonary:      Effort: Pulmonary effort is normal. No respiratory distress or retractions. Breath sounds: Normal breath sounds. No stridor. Comments: Expiratory phase is prolonged. Chest:      Chest wall: No mass, lacerations, deformity, swelling, tenderness or edema. Breasts: Breasts are asymmetrical.         Right: No inverted nipple, mass, nipple discharge, skin change or tenderness. Left: No inverted nipple, mass, nipple discharge, skin change or tenderness. Comments: Right chest scar intact. No clinically suspicious findings. No axillary adenopathy. Abdominal:      General: There is no distension. Palpations: Abdomen is soft. Tenderness: There is no abdominal tenderness. There is no guarding or rebound. Musculoskeletal:         General: No tenderness or deformity. Normal range of motion. Right shoulder: Normal.      Left shoulder: Normal.      Cervical back: Normal range of motion and neck supple. Lymphadenopathy:      Cervical: No cervical adenopathy. Right cervical: No superficial, deep or posterior cervical adenopathy. Left cervical: No superficial, deep or posterior cervical adenopathy. Upper Body:      Right upper body: No supraclavicular or pectoral adenopathy. Left upper body: No supraclavicular or pectoral adenopathy. Skin:     General: Skin is warm and dry. Coloration: Skin is not pale. Findings: No erythema or rash. Neurological:      Mental Status: She is alert and oriented to person, place, and time. Coordination: Coordination normal.   Psychiatric:         Behavior: Behavior normal.         Thought Content: Thought content normal.         Judgment: Judgment normal.       Assessment:      59 y.o. extremely pleasant male without unusual risk factors for carcinoma of the breast, who underwent a right simple mastectomy, right axillary sentinel lymph node excision, right axillary dissection on March 2, 2016. Pathological evaluation demonstrated:  A. 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 Tumor Size: Size of Largest Invasive Carcinoma 2.0 cm. Margins of excision are negative for invasive carcinoma. Skin and nipple, negative for invasive carcinoma  B. Right sentinel lymph nodes #1: Two lymph nodes, negative for neoplasm;  C. [place] lymph node #2: Single lymph node, negative for neoplasm;  D. Upper inner chest wall mass: Invasive ductal carcinoma involving fibrovascular and neurovascular tissue, negative for lymph node. Pathologic Staging:  Primary tumor- pT 1c  Regional lymph nodes-(sn)pN 0(i-)  Distant metastases- pM x  Additional Pathologic Findings-invasive ductal carcinoma involving soft tissue of upper/inner quadrant chest    Additional note: The invasive carcinoma present in the specimen designated as upper/inner quadrant is discontinuous from the centrally located invasive carcinoma and does not involve lymphoid tissue. Tumor involves the inked margins of excision in this specimen. Intradepartmental consultation is obtained. Case discussed with Dr. Larissa Cedillo 3/7/2016.    -Oncotype DX recurrence score 5 (low risk).     -Radiation therapy: Completed 5/24/2016.   -Endocrine therapy:  Started on Arimidex in May 2016 per Dr. Andrade Sites Completed 5 year in April 2021.  -1/17/18, Left mammogram [facility]):  Negative.  -06/21/2021 Left screening mammogram:  Negative, BIRADS 1.  -06/21/2021 DEXA bone density: Final report pending. On calcium and vitamin D daily, Prolia every 6 months.  -06/21/2021 clinical follow-up is without evidence of recurrent disease. The reported spasms of the right anterior chest wall is likely neuropathic. Will trial low-dose gabapentin. - Hx tobacco abuse: Quit smoking in June 12, 2021. Recent admission to the hospital for acute hypoxic respiratory failure and COPD with exacerbation. She does not have a pulmonologist.  To see primary care on Friday and will discuss referral at that time. Plan:        1. Continue monthly breast self examination. Bring any changes to your physician's attention. 2. Routine screening imaging in January 2019 (ordered). 3. Calcium and vitamin D daily; Prolia per Medical Onc.    4. Avoid excessive caffeine intake. 5. Limit alcohol intake. 6. Gabapentin 100 mg tid to pharmacy. Call with update in 1 month  7. Oncology appointments as scheduled with Oncologist-Dr. Ana Rosa Sainz, and primary care. 8. Follow up in 1 year with mammogram (Left) same day. During today's visit, face-to-face time 25 minutes, greater than 50% in counseling education and coordination of care. All questions were answered to her apparent satisfaction, and she is agreeable to the plan as outlined above. Delvin Morton, RN, MSN, ACNP-BC, AOCNP  Advanced Oncology Certified Nurse Practitioner  Department of Breast Surgery  [facility] [place]/  [facility] in collaboration with Dr. Judith Kwong.  Yesenia PEREZ-CNP

## 2021-06-14 ENCOUNTER — HOSPITAL ENCOUNTER (INPATIENT)
Age: 61
LOS: 1 days | Discharge: HOME | DRG: 723 | End: 2021-06-15
Attending: EMERGENCY MEDICINE | Admitting: FAMILY MEDICINE
Payer: COMMERCIAL

## 2021-06-14 ENCOUNTER — APPOINTMENT (OUTPATIENT)
Dept: GENERAL RADIOLOGY | Age: 61
DRG: 723 | End: 2021-06-14
Payer: COMMERCIAL

## 2021-06-14 DIAGNOSIS — J96.01 ACUTE RESPIRATORY FAILURE WITH HYPOXIA (HCC): Primary | ICD-10-CM

## 2021-06-14 DIAGNOSIS — J44.1 COPD EXACERBATION (HCC): ICD-10-CM

## 2021-06-14 LAB
ADENOVIRUS BY PCR: NOT DETECTED
ANION GAP SERPL CALCULATED.3IONS-SCNC: 14 MMOL/L (ref 7–16)
BASOPHILS ABSOLUTE: 0.02 E9/L (ref 0–0.2)
BASOPHILS RELATIVE PERCENT: 0.3 % (ref 0–2)
BORDETELLA PARAPERTUSSIS BY PCR: NOT DETECTED
BORDETELLA PERTUSSIS BY PCR: NOT DETECTED
BUN BLDV-MCNC: 12 MG/DL (ref 6–23)
CALCIUM SERPL-MCNC: 10 MG/DL (ref 8.6–10.2)
CHLAMYDOPHILIA PNEUMONIAE BY PCR: NOT DETECTED
CHLORIDE BLD-SCNC: 101 MMOL/L (ref 98–107)
CO2: 22 MMOL/L (ref 22–29)
CORONAVIRUS 229E BY PCR: NOT DETECTED
CORONAVIRUS HKU1 BY PCR: NOT DETECTED
CORONAVIRUS NL63 BY PCR: NOT DETECTED
CORONAVIRUS OC43 BY PCR: NOT DETECTED
CREAT SERPL-MCNC: 1 MG/DL (ref 0.5–1)
EKG ATRIAL RATE: 90 BPM
EKG P AXIS: 68 DEGREES
EKG P-R INTERVAL: 150 MS
EKG Q-T INTERVAL: 370 MS
EKG QRS DURATION: 72 MS
EKG QTC CALCULATION (BAZETT): 452 MS
EKG R AXIS: -10 DEGREES
EKG T AXIS: 72 DEGREES
EKG VENTRICULAR RATE: 90 BPM
EOSINOPHILS ABSOLUTE: 0.17 E9/L (ref 0.05–0.5)
EOSINOPHILS RELATIVE PERCENT: 2.8 % (ref 0–6)
GFR AFRICAN AMERICAN: >60
GFR NON-AFRICAN AMERICAN: 56 ML/MIN/1.73
GLUCOSE BLD-MCNC: 103 MG/DL (ref 74–99)
HCT VFR BLD CALC: 46.1 % (ref 34–48)
HEMOGLOBIN: 15.6 G/DL (ref 11.5–15.5)
HUMAN METAPNEUMOVIRUS BY PCR: NOT DETECTED
HUMAN RHINOVIRUS/ENTEROVIRUS BY PCR: NOT DETECTED
IMMATURE GRANULOCYTES #: 0.04 E9/L
IMMATURE GRANULOCYTES %: 0.7 % (ref 0–5)
INFLUENZA A BY PCR: NOT DETECTED
INFLUENZA B BY PCR: NOT DETECTED
LYMPHOCYTES ABSOLUTE: 1.35 E9/L (ref 1.5–4)
LYMPHOCYTES RELATIVE PERCENT: 22.6 % (ref 20–42)
MCH RBC QN AUTO: 29.5 PG (ref 26–35)
MCHC RBC AUTO-ENTMCNC: 33.8 % (ref 32–34.5)
MCV RBC AUTO: 87.3 FL (ref 80–99.9)
MONOCYTES ABSOLUTE: 0.69 E9/L (ref 0.1–0.95)
MONOCYTES RELATIVE PERCENT: 11.6 % (ref 2–12)
MYCOPLASMA PNEUMONIAE BY PCR: NOT DETECTED
NEUTROPHILS ABSOLUTE: 3.7 E9/L (ref 1.8–7.3)
NEUTROPHILS RELATIVE PERCENT: 62 % (ref 43–80)
PARAINFLUENZA VIRUS 1 BY PCR: NOT DETECTED
PARAINFLUENZA VIRUS 2 BY PCR: NOT DETECTED
PARAINFLUENZA VIRUS 3 BY PCR: DETECTED
PARAINFLUENZA VIRUS 4 BY PCR: NOT DETECTED
PDW BLD-RTO: 15 FL (ref 11.5–15)
PLATELET # BLD: 386 E9/L (ref 130–450)
PMV BLD AUTO: 10 FL (ref 7–12)
POTASSIUM SERPL-SCNC: 4.5 MMOL/L (ref 3.5–5)
PROCALCITONIN: 0.23 NG/ML (ref 0–0.08)
RBC # BLD: 5.28 E12/L (ref 3.5–5.5)
RESPIRATORY SYNCYTIAL VIRUS BY PCR: NOT DETECTED
SARS-COV-2, NAAT: NOT DETECTED
SARS-COV-2, PCR: NOT DETECTED
SODIUM BLD-SCNC: 137 MMOL/L (ref 132–146)
WBC # BLD: 6 E9/L (ref 4.5–11.5)

## 2021-06-14 PROCEDURE — 93005 ELECTROCARDIOGRAM TRACING: CPT | Performed by: EMERGENCY MEDICINE

## 2021-06-14 PROCEDURE — 99285 EMERGENCY DEPT VISIT HI MDM: CPT

## 2021-06-14 PROCEDURE — 1200000000 HC SEMI PRIVATE

## 2021-06-14 PROCEDURE — 80048 BASIC METABOLIC PNL TOTAL CA: CPT

## 2021-06-14 PROCEDURE — 6360000002 HC RX W HCPCS: Performed by: FAMILY MEDICINE

## 2021-06-14 PROCEDURE — 36415 COLL VENOUS BLD VENIPUNCTURE: CPT

## 2021-06-14 PROCEDURE — 85025 COMPLETE CBC W/AUTO DIFF WBC: CPT

## 2021-06-14 PROCEDURE — 93010 ELECTROCARDIOGRAM REPORT: CPT | Performed by: INTERNAL MEDICINE

## 2021-06-14 PROCEDURE — 6370000000 HC RX 637 (ALT 250 FOR IP): Performed by: EMERGENCY MEDICINE

## 2021-06-14 PROCEDURE — 0202U NFCT DS 22 TRGT SARS-COV-2: CPT

## 2021-06-14 PROCEDURE — 71045 X-RAY EXAM CHEST 1 VIEW: CPT

## 2021-06-14 PROCEDURE — 6370000000 HC RX 637 (ALT 250 FOR IP): Performed by: FAMILY MEDICINE

## 2021-06-14 PROCEDURE — 94664 DEMO&/EVAL PT USE INHALER: CPT

## 2021-06-14 PROCEDURE — 84145 PROCALCITONIN (PCT): CPT

## 2021-06-14 PROCEDURE — 87635 SARS-COV-2 COVID-19 AMP PRB: CPT

## 2021-06-14 PROCEDURE — 94640 AIRWAY INHALATION TREATMENT: CPT

## 2021-06-14 RX ORDER — ACETAMINOPHEN 325 MG/1
650 TABLET ORAL EVERY 6 HOURS PRN
Status: DISCONTINUED | OUTPATIENT
Start: 2021-06-14 | End: 2021-06-15 | Stop reason: HOSPADM

## 2021-06-14 RX ORDER — ONDANSETRON 4 MG/1
4 TABLET, ORALLY DISINTEGRATING ORAL EVERY 8 HOURS PRN
Status: DISCONTINUED | OUTPATIENT
Start: 2021-06-14 | End: 2021-06-15 | Stop reason: HOSPADM

## 2021-06-14 RX ORDER — AZITHROMYCIN 250 MG/1
500 TABLET, FILM COATED ORAL ONCE
Status: COMPLETED | OUTPATIENT
Start: 2021-06-14 | End: 2021-06-14

## 2021-06-14 RX ORDER — PREDNISONE 20 MG/1
60 TABLET ORAL ONCE
Status: COMPLETED | OUTPATIENT
Start: 2021-06-14 | End: 2021-06-14

## 2021-06-14 RX ORDER — IPRATROPIUM BROMIDE AND ALBUTEROL SULFATE 2.5; .5 MG/3ML; MG/3ML
3 SOLUTION RESPIRATORY (INHALATION) ONCE
Status: COMPLETED | OUTPATIENT
Start: 2021-06-14 | End: 2021-06-14

## 2021-06-14 RX ORDER — LISINOPRIL 5 MG/1
5 TABLET ORAL DAILY
Status: DISCONTINUED | OUTPATIENT
Start: 2021-06-14 | End: 2021-06-15 | Stop reason: HOSPADM

## 2021-06-14 RX ORDER — ONDANSETRON 2 MG/ML
4 INJECTION INTRAMUSCULAR; INTRAVENOUS EVERY 6 HOURS PRN
Status: DISCONTINUED | OUTPATIENT
Start: 2021-06-14 | End: 2021-06-15 | Stop reason: HOSPADM

## 2021-06-14 RX ORDER — AZITHROMYCIN 250 MG/1
250 TABLET, FILM COATED ORAL DAILY
Status: DISCONTINUED | OUTPATIENT
Start: 2021-06-15 | End: 2021-06-15 | Stop reason: HOSPADM

## 2021-06-14 RX ORDER — VITAMIN B COMPLEX
2000 TABLET ORAL DAILY
Status: DISCONTINUED | OUTPATIENT
Start: 2021-06-14 | End: 2021-06-15 | Stop reason: HOSPADM

## 2021-06-14 RX ORDER — METHYLPREDNISOLONE SODIUM SUCCINATE 125 MG/2ML
80 INJECTION, POWDER, LYOPHILIZED, FOR SOLUTION INTRAMUSCULAR; INTRAVENOUS ONCE
Status: DISCONTINUED | OUTPATIENT
Start: 2021-06-14 | End: 2021-06-14

## 2021-06-14 RX ORDER — IPRATROPIUM BROMIDE AND ALBUTEROL SULFATE 2.5; .5 MG/3ML; MG/3ML
1 SOLUTION RESPIRATORY (INHALATION) EVERY 4 HOURS
Status: DISCONTINUED | OUTPATIENT
Start: 2021-06-14 | End: 2021-06-15 | Stop reason: HOSPADM

## 2021-06-14 RX ORDER — SODIUM CHLORIDE 9 MG/ML
25 INJECTION, SOLUTION INTRAVENOUS PRN
Status: DISCONTINUED | OUTPATIENT
Start: 2021-06-14 | End: 2021-06-15 | Stop reason: HOSPADM

## 2021-06-14 RX ORDER — POLYETHYLENE GLYCOL 3350 17 G/17G
17 POWDER, FOR SOLUTION ORAL DAILY PRN
Status: DISCONTINUED | OUTPATIENT
Start: 2021-06-14 | End: 2021-06-15 | Stop reason: HOSPADM

## 2021-06-14 RX ORDER — PANTOPRAZOLE SODIUM 40 MG/1
40 TABLET, DELAYED RELEASE ORAL
Status: DISCONTINUED | OUTPATIENT
Start: 2021-06-15 | End: 2021-06-15 | Stop reason: HOSPADM

## 2021-06-14 RX ORDER — SODIUM CHLORIDE 0.9 % (FLUSH) 0.9 %
10 SYRINGE (ML) INJECTION PRN
Status: DISCONTINUED | OUTPATIENT
Start: 2021-06-14 | End: 2021-06-14

## 2021-06-14 RX ORDER — SODIUM CHLORIDE 0.9 % (FLUSH) 0.9 %
5-40 SYRINGE (ML) INJECTION PRN
Status: DISCONTINUED | OUTPATIENT
Start: 2021-06-14 | End: 2021-06-15 | Stop reason: HOSPADM

## 2021-06-14 RX ORDER — METHYLPREDNISOLONE SODIUM SUCCINATE 40 MG/ML
40 INJECTION, POWDER, LYOPHILIZED, FOR SOLUTION INTRAMUSCULAR; INTRAVENOUS EVERY 12 HOURS
Status: DISCONTINUED | OUTPATIENT
Start: 2021-06-14 | End: 2021-06-15 | Stop reason: HOSPADM

## 2021-06-14 RX ORDER — ACETAMINOPHEN 650 MG/1
650 SUPPOSITORY RECTAL EVERY 6 HOURS PRN
Status: DISCONTINUED | OUTPATIENT
Start: 2021-06-14 | End: 2021-06-15 | Stop reason: HOSPADM

## 2021-06-14 RX ORDER — BUDESONIDE 0.5 MG/2ML
500 INHALANT ORAL 2 TIMES DAILY
Status: DISCONTINUED | OUTPATIENT
Start: 2021-06-14 | End: 2021-06-15 | Stop reason: HOSPADM

## 2021-06-14 RX ORDER — ARFORMOTEROL TARTRATE 15 UG/2ML
15 SOLUTION RESPIRATORY (INHALATION) 2 TIMES DAILY
Status: DISCONTINUED | OUTPATIENT
Start: 2021-06-14 | End: 2021-06-15 | Stop reason: HOSPADM

## 2021-06-14 RX ORDER — SODIUM CHLORIDE 0.9 % (FLUSH) 0.9 %
5-40 SYRINGE (ML) INJECTION EVERY 12 HOURS SCHEDULED
Status: DISCONTINUED | OUTPATIENT
Start: 2021-06-14 | End: 2021-06-15 | Stop reason: HOSPADM

## 2021-06-14 RX ADMIN — ENOXAPARIN SODIUM 40 MG: 40 INJECTION SUBCUTANEOUS at 16:02

## 2021-06-14 RX ADMIN — IPRATROPIUM BROMIDE AND ALBUTEROL SULFATE 3 AMPULE: .5; 3 SOLUTION RESPIRATORY (INHALATION) at 10:41

## 2021-06-14 RX ADMIN — BUDESONIDE 500 MCG: 0.5 SUSPENSION RESPIRATORY (INHALATION) at 20:12

## 2021-06-14 RX ADMIN — IPRATROPIUM BROMIDE AND ALBUTEROL SULFATE 1 AMPULE: 2.5; .5 SOLUTION RESPIRATORY (INHALATION) at 20:12

## 2021-06-14 RX ADMIN — IPRATROPIUM BROMIDE AND ALBUTEROL SULFATE 1 AMPULE: 2.5; .5 SOLUTION RESPIRATORY (INHALATION) at 16:11

## 2021-06-14 RX ADMIN — PREDNISONE 60 MG: 20 TABLET ORAL at 10:38

## 2021-06-14 RX ADMIN — METHYLPREDNISOLONE SODIUM SUCCINATE 40 MG: 40 INJECTION, POWDER, LYOPHILIZED, FOR SOLUTION INTRAMUSCULAR; INTRAVENOUS at 16:03

## 2021-06-14 RX ADMIN — AZITHROMYCIN 500 MG: 250 TABLET, FILM COATED ORAL at 16:02

## 2021-06-14 RX ADMIN — ARFORMOTEROL TARTRATE 15 MCG: 15 SOLUTION RESPIRATORY (INHALATION) at 20:12

## 2021-06-14 ASSESSMENT — ENCOUNTER SYMPTOMS
SHORTNESS OF BREATH: 1
BACK PAIN: 0
SINUS PAIN: 0
WHEEZING: 1
EYE REDNESS: 0
ABDOMINAL DISTENTION: 0
SINUS PRESSURE: 0
RHINORRHEA: 1
WHEEZING: 0
COUGH: 1
PHOTOPHOBIA: 0
NAUSEA: 0
SORE THROAT: 0
SPUTUM PRODUCTION: 0
EYE DISCHARGE: 0
VOMITING: 0
DIARRHEA: 0
ABDOMINAL PAIN: 0
EYE PAIN: 0

## 2021-06-14 ASSESSMENT — PAIN SCALES - GENERAL
PAINLEVEL_OUTOF10: 0
PAINLEVEL_OUTOF10: 0

## 2021-06-14 NOTE — H&P
[place] [id number]  Resident History and Physical      CHIEF COMPLAINT:    Chief Complaint   Patient presents with    Shortness of Breath     congestion, wheezes, denies hx of breathing issues, x2 days        History of Present Illness:   Jorden Chang  is a 59 y.o. female patient of Adele Lees MD  with a pertinent PMHx of HTN, Smoking, Breast Ca who presented to the ER from home with chief complaint of shortness of breath. She has had symptoms ongoing for 5 days. Started initially with some nasal congestion and runny nose. Progressed over the past few days to having difficulty breathing. She has not had any measured fevers but did have some sweating/chills yesterday. Feels like when she lies down she feels better. Does have some rib discomfort due to persistent coughing but no chest pain. Feels like her \"bronchial tubes are tight\". Feels more thirsty as well, was able to tolerate PO well. Normal bowel and bladder habits. Tried OTC Robitussin. She is a smoker, stopped 3 days ago. Has smoked 0.5-1ppd for 45+ years. Has never been diagnosed with COPD in the past. No symptoms of KATERIN. In the ER her labs were unremarkable. CXR showing possible emphysematous changes. Ambulated to the rest room and became hypoxic. COVID test negative. Was given duonebs, prednisone. The 10-year ASCVD risk score (Chantell Lane, et al., 2013) is: 24.7%    Values used to calculate the score:      Age: 59 years      Sex: Female      Is Non- : No      Diabetic: No      Tobacco smoker: Yes      Systolic Blood Pressure: 204 mmHg      Is BP treated: Yes      HDL Cholesterol: 29 mg/dL      Total Cholesterol: 192 mg/dL      ROS:   Review of Systems   Constitutional: Positive for fatigue. Negative for chills and fever. HENT: Positive for congestion and rhinorrhea. Negative for sinus pain and sore throat. Eyes: Negative for photophobia and visual disturbance.    Respiratory: Positive for cough and shortness of breath. Negative for wheezing. Cardiovascular: Negative for chest pain, palpitations and leg swelling. Gastrointestinal: Negative for abdominal pain, diarrhea, nausea and vomiting. Genitourinary: Negative for dysuria, frequency, hematuria and urgency. Musculoskeletal: Negative for back pain and joint swelling. Skin: Negative for rash. Neurological: Negative for dizziness, syncope, weakness, light-headedness and numbness. Psychiatric/Behavioral: Negative for sleep disturbance. Past Medical History:   Diagnosis Date    Anemia     follows with [facility] / per patient her last counts were good / see Dr. Yuliya Colvin notes in epic dated 10/8/2020    Arthritis     Blood in stool     Cancer ([facility] [address] 2015    breast right, treated with surgery and radiation and po medication    Dry eye syndrome     GERD (gastroesophageal reflux disease)     Hyperlipidemia     Hypertension     Osteoporosis     PONV (postoperative nausea and vomiting)          Past Surgical History:   Procedure Laterality Date    APPENDECTOMY      CARDIOVASCULAR STRESS TEST      CHOLECYSTECTOMY  1984    COLONOSCOPY  09/17/2018    COLONOSCOPY N/A 09/17/2018    COLONOSCOPY POLYPECTOMY SNARE/COLD BIOPSY performed by Carol Ann Mustafa MD at [address] [facility] COLONOSCOPY N/A 11/06/2020    COLONOSCOPY DIAGNOSTIC performed by Carol Ann Mustafa MD at [address] [facility], COLON, DIAGNOSTIC      HERNIA REPAIR  2009,2010    abdominal    HYSTERECTOMY, TOTAL ABDOMINAL N/A 2009    MASTECTOMY Right 2015    simple, blue dye, with right axillary sentinel lymph node excision    UPPER GASTROINTESTINAL ENDOSCOPY  04/03/2017    UPPER GASTROINTESTINAL ENDOSCOPY N/A 11/06/2020    EGD BIOPSY performed by Carol Ann Mustafa MD at [facility] ENDOSCOPY       Medications Prior to Admission:    Prior to Admission medications    Medication Sig Start Date End Date Taking?  Authorizing Provider Ferrous Fumarate-Vitamin C (ALBERTO-SEQUELS PO) Take 65 mg by mouth daily    Historical Provider, MD   omeprazole (PRILOSEC) 20 MG delayed release capsule take 1 capsule by mouth once daily WITH BREAKFAST 3/29/21   Veda Cain MD   lisinopril (PRINIVIL;ZESTRIL) 5 MG tablet take 1 tablet by mouth once daily 3/29/21   Veda Cain MD   acetaminophen (TYLENOL) 325 MG tablet Take 650 mg by mouth every 6 hours as needed for Pain    Historical Provider, MD   Cholecalciferol (VITAMIN D3 PO) Take 2,000 Units by mouth daily    Historical Provider, MD   ondansetron (ZOFRAN ODT) 4 MG disintegrating tablet Take 1 tablet by mouth every 8 hours as needed for Nausea or Vomiting (Nausea or Dry heaves) 10/24/19   Veda Cain MD        Allergies:   Codeine    Social History:    reports that she has been smoking cigarettes. She has a 20.50 pack-year smoking history. She has never used smokeless tobacco. She reports that she does not drink alcohol and does not use drugs. Family History:   family history includes Asthma in her mother; Breast Cancer in her mother; Cancer in her maternal aunt, mother, and another family member; Heart Disease in her father; High Blood Pressure in her mother, sister, and sister. PHYSICAL EXAM:    Vitals:  BP (!) 166/84   Pulse 94   Temp 98.2 °F (36.8 °C) (Temporal)   Resp 22   Ht 5' (1.524 m)   Wt 140 lb (63.5 kg)   SpO2 94%   BMI 27.34 kg/m²     Physical Exam  Vitals reviewed. Constitutional:       General: She is not in acute distress. Appearance: Normal appearance. She is not ill-appearing. HENT:      Head: Normocephalic and atraumatic. Nose: Congestion present. No rhinorrhea. Mouth/Throat:      Mouth: Mucous membranes are moist.      Pharynx: Oropharynx is clear. No oropharyngeal exudate or posterior oropharyngeal erythema. Eyes:      Conjunctiva/sclera: Conjunctivae normal.      Pupils: Pupils are equal, round, and reactive to light. Cardiovascular:      Rate and Rhythm: Normal rate and regular rhythm. Heart sounds: Normal heart sounds. No murmur heard. Pulmonary:      Effort: Pulmonary effort is normal. No respiratory distress. Breath sounds: No stridor. Wheezing (Inspiratory and Expiratory, diffuse) present. No rhonchi or rales. Abdominal:      General: Bowel sounds are normal. There is no distension. Palpations: Abdomen is soft. Tenderness: There is no abdominal tenderness. Musculoskeletal:         General: No swelling or tenderness. Normal range of motion. Cervical back: Normal range of motion. No rigidity. Right lower leg: No edema. Left lower leg: No edema. Skin:     General: Skin is warm and dry. Findings: No bruising. Neurological:      General: No focal deficit present. Mental Status: She is alert and oriented to person, place, and time. Motor: No weakness.          LABS:  Recent Results (from the past 24 hour(s))   CBC Auto Differential    Collection Time: 06/14/21 10:31 AM   Result Value Ref Range    WBC 6.0 4.5 - 11.5 E9/L    RBC 5.28 3.50 - 5.50 E12/L    Hemoglobin 15.6 (H) 11.5 - 15.5 g/dL    Hematocrit 46.1 34.0 - 48.0 %    MCV 87.3 80.0 - 99.9 fL    MCH 29.5 26.0 - 35.0 pg    MCHC 33.8 32.0 - 34.5 %    RDW 15.0 11.5 - 15.0 fL    Platelets 437 070 - 170 E9/L    MPV 10.0 7.0 - 12.0 fL    Neutrophils % 62.0 43.0 - 80.0 %    Immature Granulocytes % 0.7 0.0 - 5.0 %    Lymphocytes % 22.6 20.0 - 42.0 %    Monocytes % 11.6 2.0 - 12.0 %    Eosinophils % 2.8 0.0 - 6.0 %    Basophils % 0.3 0.0 - 2.0 %    Neutrophils Absolute 3.70 1.80 - 7.30 E9/L    Immature Granulocytes # 0.04 E9/L    Lymphocytes Absolute 1.35 (L) 1.50 - 4.00 E9/L    Monocytes Absolute 0.69 0.10 - 0.95 E9/L    Eosinophils Absolute 0.17 0.05 - 0.50 E9/L    Basophils Absolute 0.02 0.00 - 0.20 A5/H   Basic Metabolic Panel    Collection Time: 06/14/21 10:31 AM   Result Value Ref Range    Sodium 137 132 - 146 mmol/L Potassium 4.5 3.5 - 5.0 mmol/L    Chloride 101 98 - 107 mmol/L    CO2 22 22 - 29 mmol/L    Anion Gap 14 7 - 16 mmol/L    Glucose 103 (H) 74 - 99 mg/dL    BUN 12 6 - 23 mg/dL    CREATININE 1.0 0.5 - 1.0 mg/dL    GFR Non-African American 56 >=60 mL/min/1.73    GFR African American >60     Calcium 10.0 8.6 - 10.2 mg/dL   COVID-19, Rapid    Collection Time: 06/14/21 10:31 AM    Specimen: Nasopharyngeal Swab   Result Value Ref Range    SARS-CoV-2, NAAT Not Detected Not Detected   EKG 12 Lead    Collection Time: 06/14/21 10:43 AM   Result Value Ref Range    Ventricular Rate 90 BPM    Atrial Rate 90 BPM    P-R Interval 150 ms    QRS Duration 72 ms    Q-T Interval 370 ms    QTc Calculation (Bazett) 452 ms    P Axis 68 degrees    R Axis -10 degrees    T Axis 72 degrees       XR CHEST PORTABLE   Final Result   Non-specific large lung volumes may be secondary to pulmonary emphysema   and/or asthma. ASSESSMENT/PLAN:      Active Hospital Problems    Diagnosis Date Noted    Acute respiratory failure with hypoxia ([facility] [address] [J96.01] 06/14/2021    Essential hypertension [I10] 12/01/2015    Gastroesophageal reflux disease [K21.9] 12/01/2015     Acute Respiratory Failure with Hypoxia  Likely secondary to undiagnosed COPD, long term smoker. Has increased sputum production as well as worsening Dyspnea  - Duonebs q4h straight for today, decrease to while awake tomorrow  - Brovana/Pulmicort BID  - Solumedrol 40mg BID  - Resp Film Array  - Procal   - Add Azithro for 5 days    HTN  Continue Home Lisinopril  Currently on 5mg, slightly hypertensive on presentation, may need uptitration    Smoking  Denies need for Nicotine patch at this time    DVT ppx: Lovenox 40mg Daily  Code Status: Full    Home Omeprazole replaced with Protonix  Cont Home Vit D    Case discussed with attending on call Dr. Jennifer Zuluaga.     Buck Maharaj MD MD  Family Medicine Resident Physician   6/14/2021

## 2021-06-14 NOTE — ED PROVIDER NOTES
53-year-old female history of smoking history of hypertension history of breast cancer presents to the emergency department with shortness of breath and wheezing. She states 3 days ago she started noticing some upper respiratory congestion which felt like allergies to her but is now progressed to shortness of breath and wheezing and cough. She states no fevers. She states she was vaccinated with Covid completing that vaccination in April with [facility] Byers. She states no chest pain nausea vomiting diarrhea abdominal pain or leg swelling or other complaints. She has no history of blood clots. The history is provided by the patient. Shortness of Breath  Severity:  Mild  Onset quality:  Gradual  Duration:  3 days  Timing:  Intermittent  Progression:  Waxing and waning  Chronicity:  New  Relieved by:  Nothing  Worsened by:  Nothing  Ineffective treatments:  None tried  Associated symptoms: cough and wheezing    Associated symptoms: no abdominal pain, no chest pain, no ear pain, no fever, no headaches, no neck pain, no PND, no rash, no sore throat, no sputum production, no syncope and no vomiting         Review of Systems   Constitutional: Negative for chills and fever. HENT: Negative for ear pain, sinus pressure and sore throat. Eyes: Negative for pain, discharge and redness. Respiratory: Positive for cough, shortness of breath and wheezing. Negative for sputum production. Cardiovascular: Negative for chest pain, syncope and PND. Gastrointestinal: Negative for abdominal distention, abdominal pain, diarrhea, nausea and vomiting. Genitourinary: Negative for dysuria and frequency. Musculoskeletal: Negative for arthralgias, back pain and neck pain. Skin: Negative for rash and wound. Neurological: Negative for weakness and headaches. Hematological: Negative for adenopathy. All other systems reviewed and are negative. Physical Exam  Constitutional:       General: She is in acute distress. Appearance: She is well-developed. She is obese. HENT:      Head: Normocephalic and atraumatic. Cardiovascular:      Rate and Rhythm: Normal rate and regular rhythm. Pulmonary:      Effort: Pulmonary effort is normal.      Breath sounds: Wheezing present. Abdominal:      General: Bowel sounds are normal.      Palpations: Abdomen is soft. Musculoskeletal:         General: Normal range of motion. Cervical back: Normal range of motion and neck supple. Right lower leg: No tenderness. No edema. Left lower leg: No tenderness. No edema. Skin:     General: Skin is warm. Capillary Refill: Capillary refill takes less than 2 seconds. Neurological:      General: No focal deficit present. Mental Status: She is alert and oriented to person, place, and time. Procedures     MDM  Number of Diagnoses or Management Options  Acute respiratory failure with hypoxia ([facility] [address]  COPD exacerbation ([facility] [address]  Diagnosis management comments: Patient seen and examined. Labs and imaging were ordered. Patient wheezing consistently satting 90 to 92%. Patient provided breathing treatment she still satting 91 to 93% on my reevaluation with ambulation she dropped to 88%. Case discussed with Dr. Lorena Deal who will admit to the family medicine residency program service. ED Course as of Jun 14 2150   Mon Jun 14, 2021   1043 EKG: This EKG is signed and interpreted by the EP.     Time: 10:43  Rate: 90  Rhythm: Sinus  Interpretation: NSR  Comparison: stable as compared to patient's most recent EKG      [CF]      ED Course User Index  [CF] Rubens Gee DO        --------------------------------------------- PAST HISTORY ---------------------------------------------  Past Medical History:  has a past medical history of Anemia, Arthritis, Blood in stool, Cancer ([facility] [address] Dry eye syndrome, GERD (gastroesophageal reflux disease), Hyperlipidemia, Hypertension, Osteoporosis, and PONV (postoperative nausea and vomiting). Past Surgical History:  has a past surgical history that includes Cholecystectomy (1984); Mastectomy (Right, 2015); cardiovascular stress test; Appendectomy; Endoscopy, colon, diagnostic; Upper gastrointestinal endoscopy (04/03/2017); Colonoscopy (09/17/2018); Colonoscopy (N/A, 09/17/2018); hernia repair ([id number]); Upper gastrointestinal endoscopy (N/A, 11/06/2020); Colonoscopy (N/A, 11/06/2020); and Hysterectomy, total abdominal (N/A, 2009). Social History:  reports that she has been smoking cigarettes. She started smoking about 47 years ago. She has a 20.50 pack-year smoking history. She has never used smokeless tobacco. She reports that she does not drink alcohol and does not use drugs. Family History: family history includes Asthma in her mother; Breast Cancer in her mother; Cancer in her maternal aunt, mother, and another family member; Heart Disease in her father; High Blood Pressure in her mother, sister, and sister. The patients home medications have been reviewed.     Allergies: Codeine    -------------------------------------------------- RESULTS -------------------------------------------------    LABS:  Results for orders placed or performed during the hospital encounter of 06/14/21   COVID-19, Rapid    Specimen: Nasopharyngeal Swab   Result Value Ref Range    SARS-CoV-2, NAAT Not Detected Not Detected   Respiratory Panel, Molecular, with COVID-19 (Restricted: peds pts or suitable admitted adults)    Specimen: Nasopharyngeal   Result Value Ref Range    Adenovirus by PCR Not Detected Not Detected    Bordetella parapertussis by PCR Not Detected Not Detected    Bordetella pertussis by PCR Not Detected Not Detected    Chlamydophilia pneumoniae by PCR Not Detected Not Detected    Coronavirus 229E by PCR Not Detected Not Detected    Coronavirus HKU1 by PCR Not Detected Not Detected    Coronavirus NL63 by PCR Not Detected Not Detected    Coronavirus OC43 by PCR Not Detected Not Detected    SARS-CoV-2, PCR Not Detected Not Detected    Human Metapneumovirus by PCR Not Detected Not Detected    Human Rhinovirus/Enterovirus by PCR Not Detected Not Detected    Influenza A by PCR Not Detected Not Detected    Influenza B by PCR Not Detected Not Detected    Mycoplasma pneumoniae by PCR Not Detected Not Detected    Parainfluenza Virus 1 by PCR Not Detected Not Detected    Parainfluenza Virus 2 by PCR Not Detected Not Detected    Parainfluenza Virus 3 by PCR DETECTED (A) Not Detected    Parainfluenza Virus 4 by PCR Not Detected Not Detected    Respiratory Syncytial Virus by PCR Not Detected Not Detected   CBC Auto Differential   Result Value Ref Range    WBC 6.0 4.5 - 11.5 E9/L    RBC 5.28 3.50 - 5.50 E12/L    Hemoglobin 15.6 (H) 11.5 - 15.5 g/dL    Hematocrit 46.1 34.0 - 48.0 %    MCV 87.3 80.0 - 99.9 fL    MCH 29.5 26.0 - 35.0 pg    MCHC 33.8 32.0 - 34.5 %    RDW 15.0 11.5 - 15.0 fL    Platelets 971 883 - 769 E9/L    MPV 10.0 7.0 - 12.0 fL    Neutrophils % 62.0 43.0 - 80.0 %    Immature Granulocytes % 0.7 0.0 - 5.0 %    Lymphocytes % 22.6 20.0 - 42.0 %    Monocytes % 11.6 2.0 - 12.0 %    Eosinophils % 2.8 0.0 - 6.0 %    Basophils % 0.3 0.0 - 2.0 %    Neutrophils Absolute 3.70 1.80 - 7.30 E9/L    Immature Granulocytes # 0.04 E9/L    Lymphocytes Absolute 1.35 (L) 1.50 - 4.00 E9/L    Monocytes Absolute 0.69 0.10 - 0.95 E9/L    Eosinophils Absolute 0.17 0.05 - 0.50 E9/L    Basophils Absolute 0.02 0.00 - 0.20 S6/X   Basic Metabolic Panel   Result Value Ref Range    Sodium 137 132 - 146 mmol/L    Potassium 4.5 3.5 - 5.0 mmol/L    Chloride 101 98 - 107 mmol/L    CO2 22 22 - 29 mmol/L    Anion Gap 14 7 - 16 mmol/L    Glucose 103 (H) 74 - 99 mg/dL    BUN 12 6 - 23 mg/dL    CREATININE 1.0 0.5 - 1.0 mg/dL    GFR Non-African American 56 >=60 mL/min/1.73    GFR African American >60     Calcium 10.0 8.6 - 10.2 mg/dL   Procalcitonin   Result Value Ref Range    Procalcitonin 0.23 (H) 0.00 - 0.08 ng/mL   EKG 12 Lead   Result Value Ref Range    Ventricular Rate 90 BPM    Atrial Rate 90 BPM    P-R Interval 150 ms    QRS Duration 72 ms    Q-T Interval 370 ms    QTc Calculation (Bazett) 452 ms    P Axis 68 degrees    R Axis -10 degrees    T Axis 72 degrees       RADIOLOGY:  XR CHEST PORTABLE    Result Date: 6/14/2021  EXAMINATION: ONE XRAY VIEW OF THE CHEST 6/14/2021 10:37 am COMPARISON: Chest CT 03/27/2021 HISTORY: ORDERING SYSTEM PROVIDED HISTORY: cough, congestion TECHNOLOGIST PROVIDED HISTORY: Reason for exam:->cough, congestion FINDINGS: Slight thoracic aortic calcification again noted. There is degenerative spondylosis of the thoracic spine. Oblique patient position limits the examination. Nonspecific large lung volumes suggest hyperinflation. This may reflect pulmonary emphysema. Cardiac silhouette size is normal without vascular congestion. There is no pulmonary consolidation or infiltrate. No evidence of pleural effusion. No radiographically visible pneumothorax. No acute displaced fracture. Non-specific large lung volumes may be secondary to pulmonary emphysema and/or asthma.     ------------------------- NURSING NOTES AND VITALS REVIEWED ---------------------------  Date / Time Roomed:  6/14/2021  9:44 AM  ED Bed Assignment:  [id number]    The nursing notes within the ED encounter and vital signs as below have been reviewed.      Patient Vitals for the past 24 hrs:   BP Temp Temp src Pulse Resp SpO2 Height Weight   06/14/21 2034 (!) 112/55 97.4 °F (36.3 °C) Oral 99 20 95 % -- --   06/14/21 1613 -- -- -- -- 22 96 % -- --   06/14/21 1430 137/81 97.8 °F (36.6 °C) Oral 95 22 95 % -- --   06/14/21 1342 135/72 97.9 °F (36.6 °C) Oral 92 18 -- -- --   06/14/21 1308 135/72 -- -- 89 -- -- -- --   06/14/21 1306 -- -- -- 91 -- 96 % -- --   06/14/21 1216 -- -- -- -- -- 94 % -- --   06/14/21 1213 -- -- -- 94 -- 90 % -- --   06/14/21 1143 -- -- -- -- -- 91 % -- --   06/14/21 0942 (!) 166/84 98.2 °F (36.8 °C) Temporal 108 22 93 % 5' (1.524 m) 140 lb (63.5 kg)       Oxygen Saturation Interpretation: Normal    ------------------------------------------ PROGRESS NOTES ------------------------------------------  Counseling:  I have spoken with the patient and discussed todays results, in addition to providing specific details for the plan of care and counseling regarding the diagnosis and prognosis. Their questions are answered at this time and they are agreeable with the plan of admission.    --------------------------------- ADDITIONAL PROVIDER NOTES ---------------------------------  This patient's ED course included: a personal history and physicial examination, re-evaluation prior to disposition, multiple bedside re-evaluations, IV medications, cardiac monitoring and continuous pulse oximetry    This patient has remained hemodynamically stable during their ED course. Diagnosis:  1. Acute respiratory failure with hypoxia ([address]    2. COPD exacerbation ([facility] [address]        Disposition:  Patient's disposition: Admit to telemetry  Patient's condition is fair.          Vernelle Scheuermann, DO  06/14/21 [id number]

## 2021-06-14 NOTE — PROGRESS NOTES
Database complete. Medications reconciled. Care plans and education initiated. Hx of R mastectomy with restrictions to RUE.

## 2021-06-15 VITALS
SYSTOLIC BLOOD PRESSURE: 140 MMHG | BODY MASS INDEX: 27.48 KG/M2 | HEART RATE: 91 BPM | RESPIRATION RATE: 18 BRPM | OXYGEN SATURATION: 96 % | DIASTOLIC BLOOD PRESSURE: 70 MMHG | HEIGHT: 60 IN | WEIGHT: 140 LBS | TEMPERATURE: 98 F

## 2021-06-15 PROBLEM — J96.01 ACUTE RESPIRATORY FAILURE WITH HYPOXIA (HCC): Status: RESOLVED | Noted: 2021-06-14 | Resolved: 2021-06-15

## 2021-06-15 LAB
ANION GAP SERPL CALCULATED.3IONS-SCNC: 12 MMOL/L (ref 7–16)
BASOPHILS ABSOLUTE: 0.02 E9/L (ref 0–0.2)
BASOPHILS RELATIVE PERCENT: 0.2 % (ref 0–2)
BUN BLDV-MCNC: 21 MG/DL (ref 6–23)
CALCIUM SERPL-MCNC: 9.5 MG/DL (ref 8.6–10.2)
CHLORIDE BLD-SCNC: 100 MMOL/L (ref 98–107)
CO2: 22 MMOL/L (ref 22–29)
CREAT SERPL-MCNC: 1.2 MG/DL (ref 0.5–1)
EOSINOPHILS ABSOLUTE: 0 E9/L (ref 0.05–0.5)
EOSINOPHILS RELATIVE PERCENT: 0 % (ref 0–6)
GFR AFRICAN AMERICAN: 55
GFR NON-AFRICAN AMERICAN: 45 ML/MIN/1.73
GLUCOSE BLD-MCNC: 169 MG/DL (ref 74–99)
HCT VFR BLD CALC: 40.5 % (ref 34–48)
HEMOGLOBIN: 13.8 G/DL (ref 11.5–15.5)
IMMATURE GRANULOCYTES #: 0.07 E9/L
IMMATURE GRANULOCYTES %: 0.8 % (ref 0–5)
LYMPHOCYTES ABSOLUTE: 0.98 E9/L (ref 1.5–4)
LYMPHOCYTES RELATIVE PERCENT: 11.2 % (ref 20–42)
MCH RBC QN AUTO: 29.2 PG (ref 26–35)
MCHC RBC AUTO-ENTMCNC: 34.1 % (ref 32–34.5)
MCV RBC AUTO: 85.8 FL (ref 80–99.9)
MONOCYTES ABSOLUTE: 0.92 E9/L (ref 0.1–0.95)
MONOCYTES RELATIVE PERCENT: 10.6 % (ref 2–12)
NEUTROPHILS ABSOLUTE: 6.73 E9/L (ref 1.8–7.3)
NEUTROPHILS RELATIVE PERCENT: 77.2 % (ref 43–80)
PDW BLD-RTO: 14.9 FL (ref 11.5–15)
PLATELET # BLD: 318 E9/L (ref 130–450)
PMV BLD AUTO: 9.2 FL (ref 7–12)
POTASSIUM REFLEX MAGNESIUM: 4.5 MMOL/L (ref 3.5–5)
RBC # BLD: 4.72 E12/L (ref 3.5–5.5)
SODIUM BLD-SCNC: 134 MMOL/L (ref 132–146)
WBC # BLD: 8.7 E9/L (ref 4.5–11.5)

## 2021-06-15 PROCEDURE — 94669 MECHANICAL CHEST WALL OSCILL: CPT

## 2021-06-15 PROCEDURE — 6370000000 HC RX 637 (ALT 250 FOR IP): Performed by: FAMILY MEDICINE

## 2021-06-15 PROCEDURE — 94640 AIRWAY INHALATION TREATMENT: CPT

## 2021-06-15 PROCEDURE — 2580000003 HC RX 258: Performed by: FAMILY MEDICINE

## 2021-06-15 PROCEDURE — 6360000002 HC RX W HCPCS: Performed by: FAMILY MEDICINE

## 2021-06-15 PROCEDURE — 36415 COLL VENOUS BLD VENIPUNCTURE: CPT

## 2021-06-15 PROCEDURE — 80048 BASIC METABOLIC PNL TOTAL CA: CPT

## 2021-06-15 PROCEDURE — 85025 COMPLETE CBC W/AUTO DIFF WBC: CPT

## 2021-06-15 PROCEDURE — 99235 HOSP IP/OBS SAME DATE MOD 70: CPT | Performed by: FAMILY MEDICINE

## 2021-06-15 PROCEDURE — 2700000000 HC OXYGEN THERAPY PER DAY

## 2021-06-15 RX ORDER — AZITHROMYCIN 250 MG/1
250 TABLET, FILM COATED ORAL DAILY
Qty: 3 TABLET | Refills: 0 | Status: SHIPPED | OUTPATIENT
Start: 2021-06-16 | End: 2021-06-19

## 2021-06-15 RX ORDER — PREDNISONE 10 MG/1
40 TABLET ORAL DAILY
Qty: 3 TABLET | Refills: 0 | Status: SHIPPED | OUTPATIENT
Start: 2021-06-15 | End: 2021-06-15 | Stop reason: SDUPTHER

## 2021-06-15 RX ORDER — ALBUTEROL SULFATE 90 UG/1
2 AEROSOL, METERED RESPIRATORY (INHALATION) 4 TIMES DAILY PRN
Qty: 1 INHALER | Refills: 1 | Status: SHIPPED | OUTPATIENT
Start: 2021-06-15

## 2021-06-15 RX ORDER — PREDNISONE 20 MG/1
40 TABLET ORAL DAILY
Qty: 6 TABLET | Refills: 0 | Status: SHIPPED | OUTPATIENT
Start: 2021-06-15 | End: 2021-06-18

## 2021-06-15 RX ADMIN — Medication 2000 UNITS: at 09:19

## 2021-06-15 RX ADMIN — METHYLPREDNISOLONE SODIUM SUCCINATE 40 MG: 40 INJECTION, POWDER, LYOPHILIZED, FOR SOLUTION INTRAMUSCULAR; INTRAVENOUS at 04:42

## 2021-06-15 RX ADMIN — ACETAMINOPHEN 650 MG: 325 TABLET ORAL at 09:19

## 2021-06-15 RX ADMIN — BUDESONIDE 500 MCG: 0.5 SUSPENSION RESPIRATORY (INHALATION) at 08:45

## 2021-06-15 RX ADMIN — PANTOPRAZOLE SODIUM 40 MG: 40 TABLET, DELAYED RELEASE ORAL at 06:08

## 2021-06-15 RX ADMIN — LISINOPRIL 5 MG: 5 TABLET ORAL at 09:19

## 2021-06-15 RX ADMIN — IPRATROPIUM BROMIDE AND ALBUTEROL SULFATE 1 AMPULE: 2.5; .5 SOLUTION RESPIRATORY (INHALATION) at 08:45

## 2021-06-15 RX ADMIN — AZITHROMYCIN 250 MG: 250 TABLET, FILM COATED ORAL at 09:19

## 2021-06-15 RX ADMIN — ARFORMOTEROL TARTRATE 15 MCG: 15 SOLUTION RESPIRATORY (INHALATION) at 08:45

## 2021-06-15 RX ADMIN — SODIUM CHLORIDE, PRESERVATIVE FREE 10 ML: 5 INJECTION INTRAVENOUS at 09:20

## 2021-06-15 RX ADMIN — IPRATROPIUM BROMIDE AND ALBUTEROL SULFATE 1 AMPULE: 2.5; .5 SOLUTION RESPIRATORY (INHALATION) at 12:21

## 2021-06-15 RX ADMIN — IPRATROPIUM BROMIDE AND ALBUTEROL SULFATE 1 AMPULE: 2.5; .5 SOLUTION RESPIRATORY (INHALATION) at 00:35

## 2021-06-15 ASSESSMENT — ENCOUNTER SYMPTOMS
DIARRHEA: 0
CHEST TIGHTNESS: 0
NAUSEA: 0
COUGH: 0
ABDOMINAL PAIN: 0

## 2021-06-15 ASSESSMENT — PAIN DESCRIPTION - ONSET: ONSET: ON-GOING

## 2021-06-15 ASSESSMENT — PAIN DESCRIPTION - FREQUENCY: FREQUENCY: INTERMITTENT

## 2021-06-15 ASSESSMENT — PAIN DESCRIPTION - DESCRIPTORS: DESCRIPTORS: HEADACHE

## 2021-06-15 ASSESSMENT — PAIN SCALES - GENERAL
PAINLEVEL_OUTOF10: 0
PAINLEVEL_OUTOF10: 5

## 2021-06-15 ASSESSMENT — PAIN DESCRIPTION - PAIN TYPE: TYPE: ACUTE PAIN

## 2021-06-15 ASSESSMENT — PAIN DESCRIPTION - ORIENTATION: ORIENTATION: POSTERIOR

## 2021-06-15 ASSESSMENT — PAIN DESCRIPTION - PROGRESSION: CLINICAL_PROGRESSION: NOT CHANGED

## 2021-06-15 ASSESSMENT — PAIN DESCRIPTION - LOCATION: LOCATION: HEAD

## 2021-06-15 NOTE — PROGRESS NOTES
CLINICAL PHARMACY NOTE: MEDS TO BEDS    Total # of Prescriptions Filled: 3   The following medications were delivered to the patient:  · Albuterol hfa  · Prednisone 20 mg  · Azithromycin 250 mg    Additional Documentation:

## 2021-06-15 NOTE — DISCHARGE INSTR - COC
Continuity of Care Form    Patient Name: Sobia Jack   :  1960  MRN:  [id number]    6 [facility] date:  2021  Discharge date:  ***    Code Status Order: Full Code   Advance Directives:   Advance Care Flowsheet Documentation     Date/Time Healthcare Directive Type of Healthcare Directive Copy in [address] Agent's Name Healthcare Agent's Phone Number    21 1400  Yes, patient has an advance directive for healthcare treatment  Health care treatment directive  No, copy requested from family  Adult Children  Lenward Sandhoff  [phone]          Admitting Physician:  Chava Laguna MD  PCP: Sukhi Muller MD    Discharging Nurse: [facility] Unit/Room#: 1410/9834-Q  Discharging Unit Phone Number: ***    Emergency Contact:   Extended Emergency Contact Information  Primary Emergency Contact: Rogelio Viramontes [address] Phone: [phone]  Mobile Phone: [phone]  Relation: Child   needed?  No    Past Surgical History:  Past Surgical History:   Procedure Laterality Date    APPENDECTOMY      CARDIOVASCULAR STRESS TEST      CHOLECYSTECTOMY  1984    COLONOSCOPY  2018    COLONOSCOPY N/A 2018    COLONOSCOPY POLYPECTOMY SNARE/COLD BIOPSY performed by Chelita Flannery MD at [address] [facility] COLONOSCOPY N/A 2020    COLONOSCOPY DIAGNOSTIC performed by Chelita Flannery MD at [facility], COLON, DIAGNOSTIC      HERNIA REPAIR  ,    abdominal    HYSTERECTOMY, TOTAL ABDOMINAL N/A 2009    MASTECTOMY Right 2015    simple, blue dye, with right axillary sentinel lymph node excision    UPPER GASTROINTESTINAL ENDOSCOPY  2017    UPPER GASTROINTESTINAL ENDOSCOPY N/A 2020    EGD BIOPSY performed by Chelita Flannery MD at [facility] ENDOSCOPY       Immunization History:   Immunization History   Administered Date(s) Administered    COVID-19, Moderna, PF, 100mcg/0.5mL 03/24/2021, 04/21/2021    Influenza Virus Vaccine 12/01/2015, 10/01/2018    Influenza, Laz [place], IM, PF (6 mo and older Fluzone, Flulaval, Fluarix, and 3 yrs and older Afluria) 09/26/2016, 09/21/2017, 10/24/2019, 10/08/2020    Pneumococcal Conjugate 13-valent (Sffrydt61) 09/26/2016    Pneumococcal Polysaccharide (Iccjvugiu87) 09/21/2017    Td vaccine (adult) 10/23/1998    Zoster Recombinant (Shingrix) 04/26/2021       Active Problems:  Patient Active Problem List   Diagnosis Code    Chronic otitis externa of right ear H60.61    Essential hypertension I10    Gastroesophageal reflux disease K21.9    Malignant neoplasm of right female breast ([facility] C50.911    Osteoporosis M81.0    Mild episode of recurrent major depressive disorder ([facility] F33.0       Isolation/Infection:   Isolation          Contact        Patient Infection Status     Infection Onset Added Last Indicated Last Indicated By Review Planned Expiration Resolved Resolved By    Parainfluenza 06/14/21 06/14/21 06/14/21 Respiratory Panel, Molecular, with COVID-19 (Restricted: peds pts or suitable admitted adults) 06/21/21       Resolved    COVID-19 Rule Out 06/14/21 06/14/21 06/14/21 Respiratory Panel, Molecular, with COVID-19 (Restricted: peds pts or suitable admitted adults) (Ordered)   06/14/21 Rule-Out Test Resulted    COVID-19 Rule Out 06/14/21 06/14/21 06/14/21 COVID-19, Rapid (Ordered)   06/14/21 Rule-Out Test Resulted    COVID-19 Rule Out 11/02/20 11/02/20 11/02/20 Covid-19 Ambulatory (Ordered)   11/04/20 Rule-Out Test Resulted          Nurse Assessment:  Last Vital Signs: BP (!) 140/70   Pulse 91   Temp 98 °F (36.7 °C) (Oral)   Resp 18   Ht 5' (1.524 m)   Wt 140 lb (63.5 kg)   SpO2 93%   BMI 27.34 kg/m²     Last documented pain score (0-10 scale): Pain Level: 5  Last Weight:   Wt Readings from Last 1 Encounters:   06/14/21 140 lb (63.5 kg)     Mental Status:  {IP PT MENTAL STATUS:20030}    IV Access:  {[facility] IV TVERRF:343215007}    Nursing Mobility/ADLs:  Walking   {CHP DME SPXH:373697857}  Transfer  {CHP DME AJGI:162775621}  Bathing  {CHP DME DCFA:369698777}  Dressing  {CHP DME OOP}  Toileting  {CHP DME JNIW:665509455}  Feeding  {CHP DME KJKD:817145852}  Med Admin  {P DME ZGAD:297679892}  Med Delivery   {[facility] MED Delivery:807073179}    Wound Care Documentation and Therapy:  Incision 16 Breast Right (Active)   Number of days: 193        Elimination:  Continence:   · Bowel: {YES / GB:68859}  · Bladder: {YES / YV:23772}  Urinary Catheter: {Urinary Catheter:923192373}   Colostomy/Ileostomy/Ileal Conduit: {YES / PB:48825}       Date of Last BM: ***    Intake/Output Summary (Last 24 hours) at 6/15/2021 1112  Last data filed at 6/15/2021 0925  Gross per 24 hour   Intake 480 ml   Output --   Net 480 ml     No intake/output data recorded.     Safety Concerns:     [address] [employer] Safety Concerns:417436475}    Impairments/Disabilities:      [address] [employer] Impairments/Disabilities:573226866}    Nutrition Therapy:  Current Nutrition Therapy:   [address] [employer] Diet List:797347780}    Routes of Feeding: {P DME Other Feedings:209771572}  Liquids: {Slp liquid thickness:03327}  Daily Fluid Restriction: {CHP DME Yes amt example:263653672}  Last Modified Barium Swallow with Video (Video Swallowing Test): {Done Not Done TLNM:702391123}    Treatments at the Time of Hospital Discharge:   Respiratory Treatments: ***  Oxygen Therapy:  {Therapy; copd oxygen:04435}  Ventilator:    { CC Vent JOZN:878652959}    Rehab Therapies: {THERAPEUTIC INTERVENTION:3339809704}  Weight Bearing Status/Restrictions: [employer]  Weight Bearin}  Other Medical Equipment (for information only, NOT a DME order):  {EQUIPMENT:692302427}  Other Treatments: ***    Patient's personal belongings (please select all that are sent with patient):  {YARY DME Belongings:873212582}    RN SIGNATURE:  {Esignature:126826067}    CASE MANAGEMENT/SOCIAL WORK SECTION    Inpatient Status Date: ***    Readmission Risk Assessment Score:  Readmission Risk              Risk of Unplanned Readmission:  13           Discharging to Facility/ Agency   · Name:   · Address:  · Phone:  · Fax:    Dialysis Facility (if applicable)   · Name:  · Address:  · Dialysis Schedule:  · Phone:  · Fax:    / signature: {Esignature:224687028}    PHYSICIAN SECTION    Prognosis: {Prognosis:4250652266}    Condition at Discharge: [address] [place] [address] Patient Condition:609703741}    Rehab Potential (if transferring to Rehab): {Prognosis:8645310326}    Recommended Labs or Other Treatments After Discharge: ***    Physician Certification: I certify the above information and transfer of Michael Edwards  is necessary for the continuing treatment of the diagnosis listed and that she requires {Admit to Appropriate Level of Care:23566} for {GREATER/LESS:975213405} 30 days.      Update Admission H&P: {CHP DME Changes in PDRBR:259026953}    PHYSICIAN SIGNATURE:  {Esignature:798168653}

## 2021-06-15 NOTE — CARE COORDINATION
Social Work / Discharge Planning : SW met with patient and explained role as discharge planner/ transition of care. Patient admitted with Parainfluenza Virus 3. Patient verified plan at discharge is to return home to her apartment where she independently resides alone. Patient stated she drove herself to the hospital. Patient currently off 02 but has been wearing it. Patient currently on nebulizer treat,ment. Patient does NOT have these at home and if needed, does NOT have DME preference. Patient denies any additional needs. Patient PCP is DR Hector Crawford. Patient uses [facility] [address] in [facility]. She stated she recently moved to [place] 5 years ago and she has her daughter and two grandchildren here. Await needs. SW to follow.  Electronically signed by ESTELA King on 6/15/21 at 10:00 AM EDT

## 2021-06-15 NOTE — DISCHARGE SUMMARY
Physician Discharge Summary  [facility]     Patient ID:  Waylon Loza  [id number]  83 y.o.  1960    Admit date: 6/14/2021    Discharge date: 06/15/2021    Admission Diagnoses:   Acute respiratory failure with hypoxia [facility]) [J96.01]    Discharge Diagnoses: Active Problems:    Essential hypertension    Gastroesophageal reflux disease  Resolved Problems:    Acute respiratory failure with hypoxia ([facility])    Consults: none    Procedures: None    Hospital Course: 59year old female was admitted for acute respiratory failure with hypoxia secondary to viral infection/newly diagnosed COPD. She has had symptoms going on for 5 days which initially started with some nasal congestion and runny nose. This then progressed over the past few days to having difficulty breathing. She has smoked 0.5-1 PPD for 45+ years. In the ER her labs were unremarkable. CXR showing possible emphysematous changes. She ambulated to the rest room and became hypoxic. COVID test negative. She was treated with duonebs and prednisone in the ER. During the admission, she was treated with breathing treatments and solumedrol. Due to dyspnea and increased sputum production, she was started on Azithromycin. Respiratory film array was positive for parainfluenza. On discharge, prednisone and azithromycin was continued for an additional 3 days. She was also given MDI (albuterol) to use at home. Patient was discharged in a stable and improved condition. Post Discharge Follow up: Formal testing for COPD    Discharge Exam: See progress note from today    Disposition: home  Patient condition: good    Patient Instructions:    Activity: activity as tolerated  Diet: regular diet    Discharge Medication List:     Medication List      START taking these medications    albuterol sulfate  (90 Base) MCG/ACT inhaler  Commonly known as: Ventolin HFA  Inhale 2 puffs into the lungs 4 times daily as needed for Wheezing azithromycin 250 MG tablet  Commonly known as: ZITHROMAX  Take 1 tablet by mouth daily for 3 days  Start taking on: June 16, 2021     predniSONE 20 MG tablet  Commonly known as: DELTASONE  Take 2 tablets by mouth daily for 3 days        CONTINUE taking these medications    acetaminophen 325 MG tablet  Commonly known as: TYLENOL     ALBERTO-SEQUELS PO     lisinopril 5 MG tablet  Commonly known as: PRINIVIL;ZESTRIL  take 1 tablet by mouth once daily     omeprazole 20 MG delayed release capsule  Commonly known as: PRILOSEC  take 1 capsule by mouth once daily WITH BREAKFAST     ondansetron 4 MG disintegrating tablet  Commonly known as: Zofran ODT  Take 1 tablet by mouth every 8 hours as needed for Nausea or Vomiting (Nausea or Dry heaves)     VITAMIN D3 PO           Where to Get Your Medications      These medications were sent to [address] [address]    Phone: [phone]   · albuterol sulfate  (90 Base) MCG/ACT inhaler  · azithromycin 250 MG tablet  · predniSONE 20 MG tablet       Follow up:  Patrick Mccarthy MD  [facility] [address]  [address] [address]  [phone]    In 3 days  Saulo Gandhi MD  Family Medicine Resident PGY-1  06/15/21   1:47 PM

## 2021-06-15 NOTE — PLAN OF CARE
Problem: Pain:  Goal: Pain level will decrease  Description: Pain level will decrease  4/34/8791 9493 by Brandan Mackey RN  Outcome: Ongoing  6/14/2021 1407 by Miguel Sullivan RN  Outcome: Met This Shift  Goal: Control of acute pain  Description: Control of acute pain  3/42/4010 3846 by Brandan Mackey RN  Outcome: Ongoing  6/14/2021 1407 by Miguel Sullivan RN  Outcome: Met This Shift  Goal: Control of chronic pain  Description: Control of chronic pain  5/86/7680 9678 by Brandan Mackey RN  Outcome: Ongoing  6/14/2021 1407 by Miguel Sullivan RN  Outcome: Met This Shift     Problem: Falls - Risk of:  Goal: Will remain free from falls  Description: Will remain free from falls  8/93/9392 1566 by Brandan Mackey RN  Outcome: Ongoing  6/14/2021 1407 by Miguel Sullivan RN  Outcome: Met This Shift  Goal: Absence of physical injury  Description: Absence of physical injury  8/15/5176 5682 by Brandan Mackey RN  Outcome: Ongoing  6/14/2021 1407 by Miguel Sullivan RN  Outcome: Met This Shift     Problem: Gas Exchange - Impaired:  Goal: Able to breathe comfortably  Description: Able to breathe comfortably  2/53/1066 1987 by Brandan Mackey RN  Outcome: Ongoing  6/14/2021 1407 by Miguel Sullivan RN  Outcome: Met This Shift

## 2021-06-16 ENCOUNTER — TELEPHONE (OUTPATIENT)
Dept: ADMINISTRATIVE | Age: 61
End: 2021-06-16

## 2021-06-16 ENCOUNTER — TELEPHONE (OUTPATIENT)
Dept: FAMILY MEDICINE CLINIC | Age: 61
End: 2021-06-16

## 2021-06-16 NOTE — TELEPHONE ENCOUNTER
[facility] Transitions Initial Follow Up Call    Outreach made within 2 business days of discharge: Yes    Patient: Steve Pritchardite Patient : 1960   MRN: [id number]  Reason for Admission: There are no discharge diagnoses documented for the most recent discharge. Discharge Date: 6/15/21       Spoke with: Gladys Mckenna     Discharge department/facility: home    TCM Interactive Patient Contact:  Was patient able to fill all prescriptions: Yes  Was patient instructed to bring all medications to the follow-up visit: Yes  Is patient taking all medications as directed in the discharge summary?  Yes  Does patient understand their discharge instructions: Yes  Does patient have questions or concerns that need addressed prior to 7-14 day follow up office visit: no    Scheduled appointment with PCP within 7-14 days    Follow Up  Future Appointments   Date Time Provider Tonio Jimenes   2021 10:00 AM [facility] [address] [facility] 1 SEYZ [facility]   2021 10:15 AM [facility] DEXA SEYZ [facility]   2021 11:00 AM [address], [facility]   10/22/2021 10:00 AM MD PETER Argueta [facility]       Arabella Luevano

## 2021-06-21 ENCOUNTER — OFFICE VISIT (OUTPATIENT)
Dept: BREAST CENTER | Age: 61
End: 2021-06-21
Payer: COMMERCIAL

## 2021-06-21 ENCOUNTER — HOSPITAL ENCOUNTER (OUTPATIENT)
Dept: GENERAL RADIOLOGY | Age: 61
Discharge: HOME | End: 2021-06-23
Payer: COMMERCIAL

## 2021-06-21 VITALS
TEMPERATURE: 96.8 F | DIASTOLIC BLOOD PRESSURE: 70 MMHG | RESPIRATION RATE: 18 BRPM | HEART RATE: 87 BPM | HEIGHT: 60 IN | WEIGHT: 140 LBS | SYSTOLIC BLOOD PRESSURE: 120 MMHG | BODY MASS INDEX: 27.48 KG/M2 | OXYGEN SATURATION: 98 %

## 2021-06-21 DIAGNOSIS — Z12.31 VISIT FOR SCREENING MAMMOGRAM: ICD-10-CM

## 2021-06-21 DIAGNOSIS — Z79.811 AROMATASE INHIBITOR USE: ICD-10-CM

## 2021-06-21 DIAGNOSIS — Z85.3 HISTORY OF BREAST CANCER: ICD-10-CM

## 2021-06-21 PROCEDURE — G8417 CALC BMI ABV UP PARAM F/U: HCPCS | Performed by: NURSE PRACTITIONER

## 2021-06-21 PROCEDURE — 1111F DSCHRG MED/CURRENT MED MERGE: CPT | Performed by: NURSE PRACTITIONER

## 2021-06-21 PROCEDURE — 1036F TOBACCO NON-USER: CPT | Performed by: NURSE PRACTITIONER

## 2021-06-21 PROCEDURE — 77063 BREAST TOMOSYNTHESIS BI: CPT

## 2021-06-21 PROCEDURE — 3017F COLORECTAL CA SCREEN DOC REV: CPT | Performed by: NURSE PRACTITIONER

## 2021-06-21 PROCEDURE — G8427 DOCREV CUR MEDS BY ELIG CLIN: HCPCS | Performed by: NURSE PRACTITIONER

## 2021-06-21 PROCEDURE — 99213 OFFICE O/P EST LOW 20 MIN: CPT | Performed by: NURSE PRACTITIONER

## 2021-06-21 PROCEDURE — 77080 DXA BONE DENSITY AXIAL: CPT

## 2021-06-21 RX ORDER — GABAPENTIN 100 MG/1
100 CAPSULE ORAL 3 TIMES DAILY
Qty: 270 CAPSULE | Refills: 1 | Status: SHIPPED
Start: 2021-06-21 | End: 2022-05-24

## 2021-06-25 ENCOUNTER — OFFICE VISIT (OUTPATIENT)
Dept: FAMILY MEDICINE CLINIC | Age: 61
End: 2021-06-25
Payer: COMMERCIAL

## 2021-06-25 VITALS
SYSTOLIC BLOOD PRESSURE: 118 MMHG | DIASTOLIC BLOOD PRESSURE: 70 MMHG | OXYGEN SATURATION: 97 % | HEART RATE: 83 BPM | WEIGHT: 142 LBS | TEMPERATURE: 97.6 F | BODY MASS INDEX: 27.73 KG/M2

## 2021-06-25 DIAGNOSIS — B34.8 PARAINFLUENZA VIRUS INFECTION: ICD-10-CM

## 2021-06-25 DIAGNOSIS — J44.1 COPD WITH ACUTE EXACERBATION (HCC): Primary | ICD-10-CM

## 2021-06-25 DIAGNOSIS — F17.211 CIGARETTE NICOTINE DEPENDENCE IN REMISSION: ICD-10-CM

## 2021-06-25 PROCEDURE — 1111F DSCHRG MED/CURRENT MED MERGE: CPT | Performed by: FAMILY MEDICINE

## 2021-06-25 PROCEDURE — 99214 OFFICE O/P EST MOD 30 MIN: CPT | Performed by: FAMILY MEDICINE

## 2021-06-25 SDOH — ECONOMIC STABILITY: FOOD INSECURITY: WITHIN THE PAST 12 MONTHS, THE FOOD YOU BOUGHT JUST DIDN'T LAST AND YOU DIDN'T HAVE MONEY TO GET MORE.: NEVER TRUE

## 2021-06-25 SDOH — ECONOMIC STABILITY: FOOD INSECURITY: WITHIN THE PAST 12 MONTHS, YOU WORRIED THAT YOUR FOOD WOULD RUN OUT BEFORE YOU GOT MONEY TO BUY MORE.: NEVER TRUE

## 2021-06-25 ASSESSMENT — SOCIAL DETERMINANTS OF HEALTH (SDOH): HOW HARD IS IT FOR YOU TO PAY FOR THE VERY BASICS LIKE FOOD, HOUSING, MEDICAL CARE, AND HEATING?: NOT HARD AT ALL

## 2021-06-25 NOTE — PROGRESS NOTES
Post-Discharge Transitional Care Management Services or Hospital Follow Up      Shannen Lombardo   YOB: 1960    Date of Office Visit:  6/25/2021  Date of Hospital Admission: 6/14/21  Date of Hospital Discharge: 6/15/21  Risk of hospital readmission (high >=14%. Medium >=10%) :Readmission Risk Score: 13      Care management risk score Rising risk (score 2-5) and Complex Care (Scores >=6): 1     Non face to face  following discharge, date last encounter closed (first attempt may have been earlier): 6/16/2021  1:02 PM    Call initiated 2 business days of discharge: Yes    Patient Active Problem List   Diagnosis    Chronic otitis externa of right ear    Essential hypertension    Gastroesophageal reflux disease    Malignant neoplasm of right female breast ([address]    Osteoporosis    Mild episode of recurrent major depressive disorder ([facility]       Allergies   Allergen Reactions    Codeine Nausea And Vomiting       Medications listed as ordered at the time of discharge from hospital   [facility] Medication Instructions ISIDRO:    Printed on:06/25/21 1354   Medication Information                      acetaminophen (TYLENOL) 325 MG tablet  Take 650 mg by mouth every 6 hours as needed for Pain             albuterol sulfate HFA (VENTOLIN HFA) 108 (90 Base) MCG/ACT inhaler  Inhale 2 puffs into the lungs 4 times daily as needed for Wheezing             Cholecalciferol (VITAMIN D3 PO)  Take 2,000 Units by mouth daily             Ferrous Fumarate-Vitamin C (ALBERTO-SEQUELS PO)  Take 65 mg by mouth Daily with supper              gabapentin (NEURONTIN) 100 MG capsule  Take 1 capsule by mouth 3 times daily for 180 days.  Intended supply: 90 days             lisinopril (PRINIVIL;ZESTRIL) 5 MG tablet  take 1 tablet by mouth once daily             omeprazole (PRILOSEC) 20 MG delayed release capsule  take 1 capsule by mouth once daily WITH BREAKFAST             ondansetron (ZOFRAN ODT) 4 MG disintegrating tablet  Take 1 tablet by mouth every 8 hours as needed for Nausea or Vomiting (Nausea or Dry heaves)                   Medications marked \"taking\" at this time  Outpatient Medications Marked as Taking for the 6/25/21 encounter (Office Visit) with Lenore Hernandez MD   Medication Sig Dispense Refill    gabapentin (NEURONTIN) 100 MG capsule Take 1 capsule by mouth 3 times daily for 180 days. Intended supply: 90 days 270 capsule 1    albuterol sulfate HFA (VENTOLIN HFA) 108 (90 Base) MCG/ACT inhaler Inhale 2 puffs into the lungs 4 times daily as needed for Wheezing 1 Inhaler 1    Ferrous Fumarate-Vitamin C (ALBERTO-SEQUELS PO) Take 65 mg by mouth Daily with supper       omeprazole (PRILOSEC) 20 MG delayed release capsule take 1 capsule by mouth once daily WITH BREAKFAST 30 capsule 5    lisinopril (PRINIVIL;ZESTRIL) 5 MG tablet take 1 tablet by mouth once daily 30 tablet 5    acetaminophen (TYLENOL) 325 MG tablet Take 650 mg by mouth every 6 hours as needed for Pain      Cholecalciferol (VITAMIN D3 PO) Take 2,000 Units by mouth daily      ondansetron (ZOFRAN ODT) 4 MG disintegrating tablet Take 1 tablet by mouth every 8 hours as needed for Nausea or Vomiting (Nausea or Dry heaves) 15 tablet 0        Medications patient taking as of now reconciled against medications ordered at time of hospital discharge: Yes    Chief Complaint   Patient presents with    Follow-Up from Hospital     SOB, \"couldn't breathe\" no chest pain       History of Present illness - Follow up of Hospital diagnosis(es): COPD exacerbation    Inpatient course: Discharge summary reviewed- see chart. Interval history/Current status: she was admitted to hospital with COPD exacerbation. Found she had a viral infection at the time. She thought allergies. She woke with difficulty breathing, exertional fatigue and SOB. No CP. She was treated and discharged as her condition improved. She has stopped smoking since coming home.    She down.  At baseline, she does not usually have shortness of breath or limitation in activities. We discussed albuterol use when needed and possibly adding a daily inhaler, though I do not know that we will give her much benefit at this time. May possibly start Spiriva depending on severity of PFTs. - Full PFT Study With Bronchodilator; Future    2. Parainfluenza virus infection  Recovered  - Full PFT Study With Bronchodilator;  Future        Medical Decision Making: moderate complexity

## 2021-07-15 DIAGNOSIS — E61.1 IRON DEFICIENCY: ICD-10-CM

## 2021-07-15 LAB
FERRITIN: 74 NG/ML
IRON SATURATION: 17 % (ref 15–50)
IRON: 56 MCG/DL (ref 37–145)
TOTAL IRON BINDING CAPACITY: 326 MCG/DL (ref 250–450)

## 2021-08-13 ENCOUNTER — TELEPHONE (OUTPATIENT)
Dept: FAMILY MEDICINE CLINIC | Age: 61
End: 2021-08-13

## 2021-08-13 NOTE — TELEPHONE ENCOUNTER
I apologized for this not being scheduled. I Gave patient central scheduling Ph # to call and get scheduled. Pt will let us know her schedule date and we will assess if we need to push out her August Follow up appt with doctor at that time.

## 2021-08-13 NOTE — TELEPHONE ENCOUNTER
----- Message from Per Crow sent at 8/12/2021 11:44 AM EDT -----  Subject: Message to Provider    QUESTIONS  Information for Provider? Patient was told she was going to be scheduled   for a pulmonary breathing test (Full PFT Study With Bronchodilator) at   [facility] on her last appointment in June 25,2021. She still has not heard   anything more about this since then.   ---------------------------------------------------------------------------  --------------  CALL BACK INFO  What is the best way for the office to contact you? OK to leave message on   voicemail  Preferred Call Back Phone Number? [phone]  ---------------------------------------------------------------------------  --------------  SCRIPT ANSWERS  Relationship to Patient?  Self

## 2021-08-20 ENCOUNTER — HOSPITAL ENCOUNTER (OUTPATIENT)
Dept: PULMONOLOGY | Age: 61
Discharge: HOME | End: 2021-08-20
Payer: COMMERCIAL

## 2021-08-20 DIAGNOSIS — J44.1 COPD WITH ACUTE EXACERBATION (HCC): ICD-10-CM

## 2021-08-20 DIAGNOSIS — B34.8 PARAINFLUENZA VIRUS INFECTION: ICD-10-CM

## 2021-08-20 PROCEDURE — 94726 PLETHYSMOGRAPHY LUNG VOLUMES: CPT

## 2021-08-20 PROCEDURE — 94729 DIFFUSING CAPACITY: CPT

## 2021-08-20 PROCEDURE — 94060 EVALUATION OF WHEEZING: CPT

## 2021-08-26 ENCOUNTER — OFFICE VISIT (OUTPATIENT)
Dept: FAMILY MEDICINE CLINIC | Age: 61
End: 2021-08-26
Payer: COMMERCIAL

## 2021-08-26 VITALS
HEART RATE: 85 BPM | OXYGEN SATURATION: 97 % | SYSTOLIC BLOOD PRESSURE: 126 MMHG | BODY MASS INDEX: 27.68 KG/M2 | WEIGHT: 141 LBS | TEMPERATURE: 98.1 F | HEIGHT: 60 IN | DIASTOLIC BLOOD PRESSURE: 86 MMHG

## 2021-08-26 DIAGNOSIS — I10 ESSENTIAL HYPERTENSION: ICD-10-CM

## 2021-08-26 DIAGNOSIS — R94.2 ABNORMAL PFT: ICD-10-CM

## 2021-08-26 DIAGNOSIS — R35.0 URINARY FREQUENCY: Primary | ICD-10-CM

## 2021-08-26 LAB
APPEARANCE FLUID: CLEAR
BILIRUBIN, POC: NORMAL
BLOOD URINE, POC: NORMAL
CLARITY, POC: CLEAR
COLOR, POC: NORMAL
GLUCOSE URINE, POC: NORMAL
KETONES, POC: NORMAL
LEUKOCYTE EST, POC: NORMAL
NITRITE, POC: NORMAL
PH, POC: 6
PROTEIN, POC: NORMAL
SPECIFIC GRAVITY, POC: 1.01
UROBILINOGEN, POC: 0.2

## 2021-08-26 PROCEDURE — 81002 URINALYSIS NONAUTO W/O SCOPE: CPT | Performed by: FAMILY MEDICINE

## 2021-08-26 PROCEDURE — G8417 CALC BMI ABV UP PARAM F/U: HCPCS | Performed by: FAMILY MEDICINE

## 2021-08-26 PROCEDURE — 99213 OFFICE O/P EST LOW 20 MIN: CPT | Performed by: FAMILY MEDICINE

## 2021-08-26 PROCEDURE — 1036F TOBACCO NON-USER: CPT | Performed by: FAMILY MEDICINE

## 2021-08-26 PROCEDURE — 3017F COLORECTAL CA SCREEN DOC REV: CPT | Performed by: FAMILY MEDICINE

## 2021-08-26 PROCEDURE — G8427 DOCREV CUR MEDS BY ELIG CLIN: HCPCS | Performed by: FAMILY MEDICINE

## 2021-08-26 RX ORDER — LISINOPRIL 5 MG/1
TABLET ORAL
Qty: 30 TABLET | Refills: 5 | Status: SHIPPED
Start: 2021-08-26 | End: 2022-02-22

## 2021-08-26 RX ORDER — OMEPRAZOLE 20 MG/1
CAPSULE, DELAYED RELEASE ORAL
Qty: 30 CAPSULE | Refills: 5 | Status: SHIPPED
Start: 2021-08-26 | End: 2022-02-22

## 2021-08-26 NOTE — PROGRESS NOTES
[facility]  Office Progress Note - Dr. Alyson Wilder  8/26/21    CC:   Chief Complaint   Patient presents with    COPD     PFT on 08/20/21    Urinary Frequency        /86 (Site: Left Upper Arm, Position: Sitting, Cuff Size: Medium Adult)   Pulse 85   Temp 98.1 °F (36.7 °C) (Temporal)   Ht 5' (1.524 m)   Wt 141 lb (64 kg)   SpO2 97%   BMI 27.54 kg/m²   Wt Readings from Last 3 Encounters:   08/26/21 141 lb (64 kg)   06/25/21 142 lb (64.4 kg)   06/21/21 140 lb (63.5 kg)     HPI:     Here for FU on PFTS  Had PFT done recently   Noticed breathing difficult when hot humid days otherwise breathing is fine  Using albuterol once in 10 days  Used albuterol when got home after discharge only twice until today   smoked 0.5-1 PPD for 45+ years  Quit smoking 06/21   Feels happy about quitting  I reviewed PFTs and no major obstruction. FEV1 to FVC ratio is normal.  FEV1 is decreased slightly. Flow volume loop looks like possible mild restrictive pattern. Going to wait on official read. No significant improvement with albuterol administration to FEV1 or FEV1 to FVC ratio. Urinary Freq  Went to [facility] and used kit for UTI s/sx  Took azole   Overall better with urinary symptoms  Still having urgency to pee and peeing a lot   No dysuria  She is drinking a lot of water daily. Sometimes up to 1 gallon. She is doing this because she thought it was healthy.  _________________________________________________________    Assessment / Charles Serrato was seen today for copd and urinary frequency. Diagnoses and all orders for this visit:    Urinary frequency  -     POCT Urinalysis no Micro  No strongly suggestive signs of infection on her urinalysis. No dysuria. Urinary frequency likely secondary to drinking a lot of water. She will cut back on that slightly. Essential hypertension  -     lisinopril (PRINIVIL;ZESTRIL) 5 MG tablet; take 1 tablet by mouth once daily  Controlled. Continue same. Abnormal PFT  PFTs look mildly abnormal to me. I will wait on official read from pulmonologist.  She likely has early COPD given long smoking history and deterioration of her breathing status with recent respiratory infection. She has stopped smoking and has maintained that so far. Strongly encouraged to continue efforts. Other orders  -Refill   omeprazole (PRILOSEC) 20 MG delayed release capsule; take 1 capsule by mouth once daily WITH BREAKFAST      Keep regular follow-up or as scheduled. Patient counseled to follow up sooner or seek more acute care if symptoms worsening or not improving according to plan. Electronically signed by Gayathri Chang MD on 8/26/2021    _________________________________________________________  Current Outpatient Medications on File Prior to Visit   Medication Sig Dispense Refill    gabapentin (NEURONTIN) 100 MG capsule Take 1 capsule by mouth 3 times daily for 180 days. Intended supply: 90 days (Patient taking differently: Take 100 mg by mouth nightly. Intended supply: 90 days) 270 capsule 1    albuterol sulfate HFA (VENTOLIN HFA) 108 (90 Base) MCG/ACT inhaler Inhale 2 puffs into the lungs 4 times daily as needed for Wheezing 1 Inhaler 1    acetaminophen (TYLENOL) 325 MG tablet Take 650 mg by mouth every 6 hours as needed for Pain      Cholecalciferol (VITAMIN D3 PO) Take 2,000 Units by mouth daily      ondansetron (ZOFRAN ODT) 4 MG disintegrating tablet Take 1 tablet by mouth every 8 hours as needed for Nausea or Vomiting (Nausea or Dry heaves) 15 tablet 0    Ferrous Fumarate-Vitamin C (ALBERTO-SEQUELS PO) Take 65 mg by mouth Daily with supper        No current facility-administered medications on file prior to visit.        Patient Active Problem List   Diagnosis Code    Chronic otitis externa of right ear H60.61    Essential hypertension I10    Gastroesophageal reflux disease K21.9    Malignant neoplasm of right female breast ([facility] [address] C50.918  Osteoporosis M81.0    Mild episode of recurrent major depressive disorder ([facility] [address] F33.0     _________________________________________________________  Past Medical History:   Diagnosis Date    Anemia     follows with [facility] / per patient her last counts were good / see Dr. Dominik Raymond notes in epic dated 10/8/2020    Arthritis     Blood in stool     Breast cancer ([facility] [address] 2015    right breast    Cancer ([facility] [address] 2015    breast right, treated with surgery and radiation and po medication    Dry eye syndrome     GERD (gastroesophageal reflux disease)     Hyperlipidemia     Hypertension     Osteoporosis     PONV (postoperative nausea and vomiting)     URI (upper respiratory infection) 06/14/2021       Family History   Problem Relation Age of Onset    High Blood Pressure Mother     Asthma Mother     Breast Cancer Mother     Cancer Mother          breast cancer    Heart Disease Father     High Blood Pressure Sister     High Blood Pressure Sister     Breast Cancer Maternal Aunt         breast cancer    Cancer Other         Maternal Aunt Esophageal cancer       Past Surgical History:   Procedure Laterality Date    APPENDECTOMY      CARDIOVASCULAR STRESS TEST      CHOLECYSTECTOMY  1984    COLONOSCOPY  09/17/2018    COLONOSCOPY N/A 09/17/2018    COLONOSCOPY POLYPECTOMY SNARE/COLD BIOPSY performed by Brenna Reyna MD at [address] COLONOSCOPY N/A 11/06/2020    COLONOSCOPY DIAGNOSTIC performed by Brenna Reyna MD at [address] [place] [address], COLON, DIAGNOSTIC      HERNIA REPAIR  2009,2010    abdominal    HYSTERECTOMY, TOTAL ABDOMINAL N/A 2009    MASTECTOMY Right 2015    simple, blue dye, with right axillary sentinel lymph node excision    UPPER GASTROINTESTINAL ENDOSCOPY  04/03/2017    UPPER GASTROINTESTINAL ENDOSCOPY N/A 11/06/2020    EGD BIOPSY performed by Brenna Reyna MD at [facility] ENDOSCOPY       Social History     Tobacco Use    Smoking status: Former Smoker     Packs/day: 0.50     Years: 41.00     Pack years: 20.50     Types: Cigarettes     Start date: 1974     Quit date: 2021     Years since quittin.2    Smokeless tobacco: Never Used   Vaping Use    Vaping Use: Never used   Substance Use Topics    Alcohol use: No     Alcohol/week: 0.0 standard drinks    Drug use: Never       Chart reviewed and updated where appropriate for PMH, Fam, and Soc Hx.  _________________________________________________________  ROS: POSITIVE: As in the HPI. Otherwise Pertinent negatives are negative.    __________________________________________________________  Physical Exam   Constitutional:    She is oriented to person, place, and time. She appears well-developed and well-nourished. Eyes:    Conjunctivae are normal.    Pupils are equal, round, and reactive to light. EOMI. Neck:    Normal range of motion. No thyromegaly or nodules noted. No bruit. No LAD. Cardiovascular:    Normal rate, regular rhythm and normal heart sounds. No murmur. No gallop and no friction rub. Pulmonary/Chest:    Effort normal and rales lower bases    No wheezes. Abdominal:    Soft. Bowel sounds are normal.    No distension. No tenderness. Musculoskeletal:    Normal range of motion. No joint swelling noted. No peripheral edema. Skin:    Skin is warm and dry. No rashes, No lesions. Psychiatric:    She has a normal mood and affect. Normal groom and dress. No SI or HI.   ________________________________________________________    This note may have been created using dictation software.  Efforts were made to reduce errors, but some may persist.

## 2021-09-01 NOTE — PROCEDURES
[address] [address]                               PULMONARY FUNCTION    PATIENT NAME: Ken Lenz                  :        1960  MED REC NO:   [id number]                            ROOM:  ACCOUNT NO:   [de-identified]                           ADMIT DATE: 2021  PROVIDER:     Gerda Barton MD    DATE OF PROCEDURE:  2021    ORDERING PROVIDER:  Floresita Barrett MD.    DIAGNOSIS:  COPD. FINDINGS:  As follows:  FVC was 1.94 liters, which was 73% of predicted. FEV1 was 1.43 liters, which was 68% of predicted. FEV1 to FVC was 74. [id number] was 52. Minute volume ventilation was severely decreased at 48  liters per minute, which was 58% of predicted. Total lung capacity was  5.22 liters, which was 117% of predicted and diffusion capacity was  11.02 mL per minute per mmHg, which was 58% of predicted. IMPRESSION:  1. Moderate obstructive lung disease. 2.  Severe decrement in minute volume ventilation. 3.  High normal total lung capacity. 4.  Moderate decrement in diffusion capacity.         Lizbeth Barreto MD    D: 2021 11:12:48       T: 2021 11:15:10     [id number]  Job#: [id number]     Doc#: [id number]    CC:

## 2021-10-22 ENCOUNTER — OFFICE VISIT (OUTPATIENT)
Dept: FAMILY MEDICINE CLINIC | Age: 61
End: 2021-10-22
Payer: COMMERCIAL

## 2021-10-22 VITALS
DIASTOLIC BLOOD PRESSURE: 74 MMHG | WEIGHT: 144 LBS | TEMPERATURE: 97.4 F | OXYGEN SATURATION: 96 % | BODY MASS INDEX: 28.27 KG/M2 | SYSTOLIC BLOOD PRESSURE: 136 MMHG | HEART RATE: 93 BPM | HEIGHT: 60 IN

## 2021-10-22 DIAGNOSIS — I10 ESSENTIAL HYPERTENSION: ICD-10-CM

## 2021-10-22 DIAGNOSIS — J06.9 VIRAL UPPER RESPIRATORY TRACT INFECTION: Primary | ICD-10-CM

## 2021-10-22 DIAGNOSIS — Z23 NEED FOR SHINGLES VACCINE: ICD-10-CM

## 2021-10-22 PROCEDURE — G8417 CALC BMI ABV UP PARAM F/U: HCPCS | Performed by: FAMILY MEDICINE

## 2021-10-22 PROCEDURE — G8482 FLU IMMUNIZE ORDER/ADMIN: HCPCS | Performed by: FAMILY MEDICINE

## 2021-10-22 PROCEDURE — 99213 OFFICE O/P EST LOW 20 MIN: CPT | Performed by: FAMILY MEDICINE

## 2021-10-22 PROCEDURE — 90472 IMMUNIZATION ADMIN EACH ADD: CPT | Performed by: FAMILY MEDICINE

## 2021-10-22 PROCEDURE — 1036F TOBACCO NON-USER: CPT | Performed by: FAMILY MEDICINE

## 2021-10-22 PROCEDURE — 3017F COLORECTAL CA SCREEN DOC REV: CPT | Performed by: FAMILY MEDICINE

## 2021-10-22 PROCEDURE — G8427 DOCREV CUR MEDS BY ELIG CLIN: HCPCS | Performed by: FAMILY MEDICINE

## 2021-10-22 PROCEDURE — 90750 HZV VACC RECOMBINANT IM: CPT | Performed by: FAMILY MEDICINE

## 2021-10-22 PROCEDURE — 90674 CCIIV4 VAC NO PRSV 0.5 ML IM: CPT | Performed by: FAMILY MEDICINE

## 2021-10-22 PROCEDURE — 90471 IMMUNIZATION ADMIN: CPT | Performed by: FAMILY MEDICINE

## 2021-10-22 NOTE — PROGRESS NOTES
sooner or seek more acute care if symptoms worsening or not improving according to plan. Electronically signed by Edna Lira MD on 10/23/2021    _________________________________________________________  Current Outpatient Medications on File Prior to Visit   Medication Sig Dispense Refill    lisinopril (PRINIVIL;ZESTRIL) 5 MG tablet take 1 tablet by mouth once daily 30 tablet 5    omeprazole (PRILOSEC) 20 MG delayed release capsule take 1 capsule by mouth once daily WITH BREAKFAST 30 capsule 5    gabapentin (NEURONTIN) 100 MG capsule Take 1 capsule by mouth 3 times daily for 180 days. Intended supply: 90 days (Patient taking differently: Take 100 mg by mouth nightly. Intended supply: 90 days) 270 capsule 1    albuterol sulfate HFA (VENTOLIN HFA) 108 (90 Base) MCG/ACT inhaler Inhale 2 puffs into the lungs 4 times daily as needed for Wheezing 1 Inhaler 1    acetaminophen (TYLENOL) 325 MG tablet Take 650 mg by mouth every 6 hours as needed for Pain      Cholecalciferol (VITAMIN D3 PO) Take 2,000 Units by mouth daily      ondansetron (ZOFRAN ODT) 4 MG disintegrating tablet Take 1 tablet by mouth every 8 hours as needed for Nausea or Vomiting (Nausea or Dry heaves) 15 tablet 0     No current facility-administered medications on file prior to visit.        Patient Active Problem List   Diagnosis Code    Chronic otitis externa of right ear H60.61    Essential hypertension I10    Gastroesophageal reflux disease K21.9    Malignant neoplasm of right female breast ([facility] [address] C50.911    Osteoporosis M81.0    Mild episode of recurrent major depressive disorder ([facility] [address] F33.0     _________________________________________________________  Past Medical History:   Diagnosis Date    Anemia     follows with [facility] / per patient her last counts were good / see Dr. Gutierrez Asp notes in epic dated 10/8/2020    Arthritis     Blood in stool     [facility]    right breast    Cancer ([facility] 2015    breast right, treated with surgery and radiation and po medication    Dry eye syndrome     GERD (gastroesophageal reflux disease)     Hyperlipidemia     Hypertension     Osteoporosis     PONV (postoperative nausea and vomiting)     URI (upper respiratory infection) 2021       Family History   Problem Relation Age of Onset    High Blood Pressure Mother     Asthma Mother     Breast Cancer Mother     Cancer Mother          breast cancer    Heart Disease Father     High Blood Pressure Sister     High Blood Pressure Sister     Breast Cancer Maternal Aunt         breast cancer    Cancer Other         Maternal Aunt Esophageal cancer       Past Surgical History:   Procedure Laterality Date    APPENDECTOMY      CARDIOVASCULAR STRESS TEST      CHOLECYSTECTOMY  1984    COLONOSCOPY  2018    COLONOSCOPY N/A 2018    COLONOSCOPY POLYPECTOMY SNARE/COLD BIOPSY performed by Isreal Baker MD at [address] [facility] COLONOSCOPY N/A 2020    COLONOSCOPY DIAGNOSTIC performed by Isreal Baker MD at [facility] [place], COLON, DIAGNOSTIC      HERNIA REPAIR  ,    abdominal    HYSTERECTOMY, TOTAL ABDOMINAL N/A     MASTECTOMY Right 2015    simple, blue dye, with right axillary sentinel lymph node excision    UPPER GASTROINTESTINAL ENDOSCOPY  2017    UPPER GASTROINTESTINAL ENDOSCOPY N/A 2020    EGD BIOPSY performed by Isreal Baker MD at [facility] ENDOSCOPY       Social History     Tobacco Use    Smoking status: Former Smoker     Packs/day: 0.50     Years: 41.00     Pack years: 20.50     Types: Cigarettes     Start date: 1974     Quit date: 2021     Years since quittin.3    Smokeless tobacco: Never Used   Vaping Use    Vaping Use: Never used   Substance Use Topics    Alcohol use: No     Alcohol/week: 0.0 standard drinks    Drug use: Never       Chart reviewed and updated where appropriate for PMH, Fam, and Soc Hx.  _________________________________________________________  ROS: POSITIVE: As in the HPI. Otherwise Pertinent negatives are negative.    __________________________________________________________  Physical Exam   Constitutional:    She is oriented to person, place, and time. She appears well-developed and well-nourished. HENT:    Right Ear: normal Pinna, normal canal, normal TM. Left Ear: normal Pinna, normal canal, normal TM. Nose: Nose normal.    Mouth/Throat: Oropharynx is clear and moist.   Eyes:    Conjunctivae are normal.    Pupils are equal, round, and reactive to light. EOMI. Neck:    Normal range of motion. No thyromegaly or nodules noted. No bruit. No LAD. Cardiovascular:    Normal rate, regular rhythm and normal heart sounds. No murmur. No gallop and no friction rub. Pulmonary/Chest:    Effort normal and breath sounds normal.    No wheezes. No rales or rhonchi. Abdominal:    Soft. Bowel sounds are normal.    No distension. No tenderness. Skin:    Skin is warm and dry. No rashes, No lesions. ________________________________________________________    This note may have been created using dictation software.  Efforts were made to reduce errors, but some may persist.

## 2021-11-15 ENCOUNTER — OFFICE VISIT (OUTPATIENT)
Dept: FAMILY MEDICINE CLINIC | Age: 61
End: 2021-11-15
Payer: COMMERCIAL

## 2021-11-15 VITALS
TEMPERATURE: 97.5 F | DIASTOLIC BLOOD PRESSURE: 78 MMHG | OXYGEN SATURATION: 97 % | BODY MASS INDEX: 28.12 KG/M2 | SYSTOLIC BLOOD PRESSURE: 124 MMHG | WEIGHT: 144 LBS | HEART RATE: 92 BPM

## 2021-11-15 DIAGNOSIS — R05.9 COUGH: ICD-10-CM

## 2021-11-15 DIAGNOSIS — H66.001 NON-RECURRENT ACUTE SUPPURATIVE OTITIS MEDIA OF RIGHT EAR WITHOUT SPONTANEOUS RUPTURE OF TYMPANIC MEMBRANE: Primary | ICD-10-CM

## 2021-11-15 PROCEDURE — 99213 OFFICE O/P EST LOW 20 MIN: CPT | Performed by: STUDENT IN AN ORGANIZED HEALTH CARE EDUCATION/TRAINING PROGRAM

## 2021-11-15 PROCEDURE — G8417 CALC BMI ABV UP PARAM F/U: HCPCS | Performed by: STUDENT IN AN ORGANIZED HEALTH CARE EDUCATION/TRAINING PROGRAM

## 2021-11-15 PROCEDURE — G8427 DOCREV CUR MEDS BY ELIG CLIN: HCPCS | Performed by: STUDENT IN AN ORGANIZED HEALTH CARE EDUCATION/TRAINING PROGRAM

## 2021-11-15 PROCEDURE — 3017F COLORECTAL CA SCREEN DOC REV: CPT | Performed by: STUDENT IN AN ORGANIZED HEALTH CARE EDUCATION/TRAINING PROGRAM

## 2021-11-15 PROCEDURE — G8482 FLU IMMUNIZE ORDER/ADMIN: HCPCS | Performed by: STUDENT IN AN ORGANIZED HEALTH CARE EDUCATION/TRAINING PROGRAM

## 2021-11-15 PROCEDURE — 1036F TOBACCO NON-USER: CPT | Performed by: STUDENT IN AN ORGANIZED HEALTH CARE EDUCATION/TRAINING PROGRAM

## 2021-11-15 RX ORDER — PREDNISONE 20 MG/1
20 TABLET ORAL 2 TIMES DAILY
Qty: 10 TABLET | Refills: 0 | Status: SHIPPED | OUTPATIENT
Start: 2021-11-15 | End: 2021-11-20

## 2021-11-15 RX ORDER — AMOXICILLIN 500 MG/1
500 CAPSULE ORAL 2 TIMES DAILY
Qty: 14 CAPSULE | Refills: 0 | Status: SHIPPED | OUTPATIENT
Start: 2021-11-15 | End: 2021-11-22

## 2021-11-15 ASSESSMENT — ENCOUNTER SYMPTOMS
NAUSEA: 0
SORE THROAT: 1
SHORTNESS OF BREATH: 0
RHINORRHEA: 1
VOMITING: 0
COUGH: 1
ABDOMINAL PAIN: 0

## 2021-11-15 NOTE — PROGRESS NOTES
Amelia Marc (:  1960) is a 59 y.o. female,Established patient, here for evaluation of the following chief complaint(s):  Congestion (drainage down throat), Otalgia (right), and Sinus Problem         ASSESSMENT/PLAN:  1. Non-recurrent acute suppurative otitis media of right ear without spontaneous rupture of tympanic membrane  -     amoxicillin (AMOXIL) 500 MG capsule; Take 1 capsule by mouth 2 times daily for 7 days, Disp-14 capsule, R-0Normal  2. Cough  -     predniSONE (DELTASONE) 20 MG tablet; Take 1 tablet by mouth 2 times daily for 5 days, Disp-10 tablet, R-0Normal  She will use amoxil for ear infection. Since she has a cough and COPD history will use prednisone. Encouraged her to use inhaler she has at home as well. Discussed return and ER precautions. Patient and or parent verbalized understanding      Return if symptoms worsen or fail to improve. Subjective   SUBJECTIVE/OBJECTIVE:  Congestion, ear pain, cough  -started Thursday  -mucinex helped somewhat with sore throat  -felt like head was going to explode  -ear pain is most bothersome  -warm compress over face helps some  -heating pad on ear made it worse  -no fever  -cough is productive of mucous  -feels like mucinex has stopped working as well  -coughing hard makes her dizzy  -she is a little SOB. She has COPD-she quit smoking in 2021      Review of Systems   Constitutional: Negative for chills and fever. HENT: Positive for congestion, ear pain, postnasal drip, rhinorrhea and sore throat. Respiratory: Positive for cough. Negative for shortness of breath. Cardiovascular: Negative for chest pain and leg swelling. Gastrointestinal: Negative for abdominal pain, nausea and vomiting. Genitourinary: Negative for dysuria and hematuria. Musculoskeletal: Negative for arthralgias and myalgias. Skin: Negative for rash and wound. Neurological: Negative for dizziness and light-headedness.           Objective   Physical Exam  Vitals reviewed. Constitutional:       General: She is not in acute distress. HENT:      Head: Normocephalic and atraumatic. Right Ear: Tympanic membrane is erythematous and bulging. Left Ear: Tympanic membrane normal.      Nose: Congestion present. Mouth/Throat:      Pharynx: Posterior oropharyngeal erythema present. Eyes:      Extraocular Movements: Extraocular movements intact. Conjunctiva/sclera: Conjunctivae normal.   Cardiovascular:      Rate and Rhythm: Normal rate and regular rhythm. Pulmonary:      Effort: Pulmonary effort is normal.      Breath sounds: Normal breath sounds. No wheezing. Abdominal:      General: Abdomen is flat. There is no distension. Musculoskeletal:         General: No tenderness or deformity. Neurological:      General: No focal deficit present. Mental Status: She is alert and oriented to person, place, and time. An electronic signature was used to authenticate this note.     --Sheree Carrion MD

## 2022-02-21 DIAGNOSIS — I10 ESSENTIAL HYPERTENSION: ICD-10-CM

## 2022-02-22 RX ORDER — LISINOPRIL 5 MG/1
TABLET ORAL
Qty: 30 TABLET | Refills: 5 | Status: SHIPPED
Start: 2022-02-22 | End: 2022-05-05 | Stop reason: SDUPTHER

## 2022-02-22 RX ORDER — OMEPRAZOLE 20 MG/1
CAPSULE, DELAYED RELEASE ORAL
Qty: 30 CAPSULE | Refills: 5 | Status: SHIPPED
Start: 2022-02-22 | End: 2022-05-05 | Stop reason: SDUPTHER

## 2022-05-05 ENCOUNTER — OFFICE VISIT (OUTPATIENT)
Dept: FAMILY MEDICINE CLINIC | Age: 62
End: 2022-05-05
Payer: COMMERCIAL

## 2022-05-05 VITALS
TEMPERATURE: 97.9 F | HEIGHT: 60 IN | SYSTOLIC BLOOD PRESSURE: 122 MMHG | WEIGHT: 144 LBS | HEART RATE: 88 BPM | DIASTOLIC BLOOD PRESSURE: 74 MMHG | BODY MASS INDEX: 28.27 KG/M2 | OXYGEN SATURATION: 98 %

## 2022-05-05 DIAGNOSIS — G89.29 CHRONIC BILATERAL LOW BACK PAIN WITHOUT SCIATICA: ICD-10-CM

## 2022-05-05 DIAGNOSIS — J43.9 PULMONARY EMPHYSEMA, UNSPECIFIED EMPHYSEMA TYPE (HCC): Primary | ICD-10-CM

## 2022-05-05 DIAGNOSIS — B00.1 HERPES LABIALIS: ICD-10-CM

## 2022-05-05 DIAGNOSIS — I10 ESSENTIAL HYPERTENSION: ICD-10-CM

## 2022-05-05 DIAGNOSIS — Z85.3 HISTORY OF BREAST CANCER: ICD-10-CM

## 2022-05-05 DIAGNOSIS — M54.50 CHRONIC BILATERAL LOW BACK PAIN WITHOUT SCIATICA: ICD-10-CM

## 2022-05-05 PROBLEM — J44.1 COPD WITH ACUTE EXACERBATION (HCC): Status: ACTIVE | Noted: 2022-05-05

## 2022-05-05 PROCEDURE — 3017F COLORECTAL CA SCREEN DOC REV: CPT | Performed by: FAMILY MEDICINE

## 2022-05-05 PROCEDURE — 3023F SPIROM DOC REV: CPT | Performed by: FAMILY MEDICINE

## 2022-05-05 PROCEDURE — 1036F TOBACCO NON-USER: CPT | Performed by: FAMILY MEDICINE

## 2022-05-05 PROCEDURE — G8417 CALC BMI ABV UP PARAM F/U: HCPCS | Performed by: FAMILY MEDICINE

## 2022-05-05 PROCEDURE — 99214 OFFICE O/P EST MOD 30 MIN: CPT | Performed by: FAMILY MEDICINE

## 2022-05-05 PROCEDURE — G8427 DOCREV CUR MEDS BY ELIG CLIN: HCPCS | Performed by: FAMILY MEDICINE

## 2022-05-05 RX ORDER — OMEPRAZOLE 20 MG/1
CAPSULE, DELAYED RELEASE ORAL
Qty: 90 CAPSULE | Refills: 3 | Status: SHIPPED | OUTPATIENT
Start: 2022-05-05

## 2022-05-05 RX ORDER — VALACYCLOVIR HYDROCHLORIDE 1 G/1
TABLET, FILM COATED ORAL
Qty: 4 TABLET | Refills: 3 | Status: SHIPPED
Start: 2022-05-05 | End: 2022-07-25

## 2022-05-05 RX ORDER — LISINOPRIL 5 MG/1
TABLET ORAL
Qty: 90 TABLET | Refills: 3 | Status: SHIPPED | OUTPATIENT
Start: 2022-05-05

## 2022-05-05 ASSESSMENT — PATIENT HEALTH QUESTIONNAIRE - PHQ9
8. MOVING OR SPEAKING SO SLOWLY THAT OTHER PEOPLE COULD HAVE NOTICED. OR THE OPPOSITE, BEING SO FIGETY OR RESTLESS THAT YOU HAVE BEEN MOVING AROUND A LOT MORE THAN USUAL: 0
2. FEELING DOWN, DEPRESSED OR HOPELESS: 0
SUM OF ALL RESPONSES TO PHQ9 QUESTIONS 1 & 2: 0
7. TROUBLE CONCENTRATING ON THINGS, SUCH AS READING THE NEWSPAPER OR WATCHING TELEVISION: 0
3. TROUBLE FALLING OR STAYING ASLEEP: 0
6. FEELING BAD ABOUT YOURSELF - OR THAT YOU ARE A FAILURE OR HAVE LET YOURSELF OR YOUR FAMILY DOWN: 0
SUM OF ALL RESPONSES TO PHQ QUESTIONS 1-9: 0
SUM OF ALL RESPONSES TO PHQ QUESTIONS 1-9: 0
9. THOUGHTS THAT YOU WOULD BE BETTER OFF DEAD, OR OF HURTING YOURSELF: 0
SUM OF ALL RESPONSES TO PHQ QUESTIONS 1-9: 0
SUM OF ALL RESPONSES TO PHQ QUESTIONS 1-9: 0
4. FEELING TIRED OR HAVING LITTLE ENERGY: 0
10. IF YOU CHECKED OFF ANY PROBLEMS, HOW DIFFICULT HAVE THESE PROBLEMS MADE IT FOR YOU TO DO YOUR WORK, TAKE CARE OF THINGS AT HOME, OR GET ALONG WITH OTHER PEOPLE: 0
1. LITTLE INTEREST OR PLEASURE IN DOING THINGS: 0
5. POOR APPETITE OR OVEREATING: 0

## 2022-05-05 NOTE — PROGRESS NOTES
[facility]  Office Progress Note - Dr. Mo Rebolledo  5/5/22    CC:   Chief Complaint   Patient presents with    Back Pain     Lower back pain when standing/walking. No known injury.  Mole     Pt reports mole on back that has been itching     Mouth Lesions     Pt reports reoccuring cold sores on lips for last 6 months. Has been treating with OTC meds, not resolving the problem. /74 (Site: Left Upper Arm, Position: Sitting, Cuff Size: Medium Adult)   Pulse 88   Temp 97.9 °F (36.6 °C) (Temporal)   Ht 5' (1.524 m)   Wt 144 lb (65.3 kg)   SpO2 98%   BMI 28.12 kg/m²   Wt Readings from Last 3 Encounters:   05/05/22 144 lb (65.3 kg)   11/15/21 144 lb (65.3 kg)   10/22/21 144 lb (65.3 kg)       HPI: had dnr discussion  If she were dying and very ill, she wouldn't want any attempt at revival after her heart stops. But today in good health, she would want CPR/AED efforts. Has a large flesh-colored skin tag pedunculated, bra rubs sometimes. Just wanted to know it was ok. It is. Offered removal, which she declines at this time. Cold sores have been acting up  Corners of her mouth. Might actually be angular cheilitis. She says it feels like a cold sore though. And also has had this on the upper lip.     low back has been bothering her  Activity makes worse  Pain is local. Does not radiate  Rest makes better  Has been ongoing for some time. COPD has been under control. Has not needed inhaler. Seems to only have problems when she gets sick. Continues to follow with oncology for history of breast cancer. Status posttreatment. Monitoring.  _________________________________________________________    Assessment / Patricia Corbin was seen today for back pain, mole and mouth lesions. Diagnoses and all orders for this visit:    Pulmonary emphysema, unspecified emphysema type ([address]  Asymptomatic. Former smoker. Has had exacerbations when she gets sick.     Essential hypertension, controlled  -Continue    lisinopril (PRINIVIL;ZESTRIL) 5 MG tablet; take 1 tablet by mouth once daily    History of breast cancer  Following with oncology and getting regular yearly mammograms. Chronic bilateral low back pain without sciatica  -     XR LUMBAR SPINE (2-3 VIEWS); Future  Central low back pain. Prominent kyphosis and prominent lordosis on exam.  X-ray. Herpes labialis  -     valACYclovir (VALTREX) 1 g tablet; Two tablets by mouth 12 hours apart for 1 day. Take a first sign of cold sore. One treatment for each episode. If not improved, I would treat for angular cheilitis    Other orders  -Refill   omeprazole (PRILOSEC) 20 MG delayed release capsule; take 1 capsule by mouth once daily with breakfast    Return in about 6 months (around 11/5/2022). or as scheduled. Patient counseled to follow up sooner or seek more acute care if symptoms worsening or not improving according to plan. Electronically signed by Tennille Mishra MD on 5/5/2022    _________________________________________________________  Current Outpatient Medications on File Prior to Visit   Medication Sig Dispense Refill    gabapentin (NEURONTIN) 100 MG capsule Take 1 capsule by mouth 3 times daily for 180 days. Intended supply: 90 days (Patient taking differently: Take 100 mg by mouth nightly. Intended supply: 90 days) 270 capsule 1    albuterol sulfate HFA (VENTOLIN HFA) 108 (90 Base) MCG/ACT inhaler Inhale 2 puffs into the lungs 4 times daily as needed for Wheezing 1 Inhaler 1    acetaminophen (TYLENOL) 325 MG tablet Take 650 mg by mouth every 6 hours as needed for Pain      Cholecalciferol (VITAMIN D3 PO) Take 2,000 Units by mouth daily       No current facility-administered medications on file prior to visit.        Patient Active Problem List   Diagnosis Code    Chronic otitis externa of right ear H60.61    Essential hypertension I10    Gastroesophageal reflux disease K21.9    Osteoporosis M81.0    Mild episode of recurrent major depressive disorder (HCC) F33.0    COPD with acute exacerbation ([facility] J44.1    History of breast cancer Z85.3     _________________________________________________________  Past Medical History:   Diagnosis Date    Anemia     follows with [facility] / per patient her last counts were good / see Dr. Lorna Martinez notes in epic dated 10/8/2020    Arthritis     Blood in stool     Breast cancer ([facility] 2015    right breast    Cancer ([facility] 2015    breast right, treated with surgery and radiation and po medication    Dry eye syndrome     GERD (gastroesophageal reflux disease)     Hyperlipidemia     Hypertension     Osteoporosis     PONV (postoperative nausea and vomiting)     URI (upper respiratory infection) 06/14/2021       Family History   Problem Relation Age of Onset    High Blood Pressure Mother     Asthma Mother     Breast Cancer Mother     Cancer Mother          breast cancer    Heart Disease Father     High Blood Pressure Sister     High Blood Pressure Sister     Breast Cancer Maternal Aunt         breast cancer    Cancer Other         Maternal Aunt Esophageal cancer       Past Surgical History:   Procedure Laterality Date    APPENDECTOMY      CARDIOVASCULAR STRESS TEST      CHOLECYSTECTOMY  1984    COLONOSCOPY  09/17/2018    COLONOSCOPY N/A 09/17/2018    COLONOSCOPY POLYPECTOMY SNARE/COLD BIOPSY performed by Suhas Nunes MD at [address] [facility] COLONOSCOPY N/A 11/06/2020    COLONOSCOPY DIAGNOSTIC performed by Suhas Nunes MD at [address] [facility], COLON, DIAGNOSTIC      HERNIA REPAIR  2009,2010    abdominal    HYSTERECTOMY, TOTAL ABDOMINAL N/A 2009    MASTECTOMY Right 2015    simple, blue dye, with right axillary sentinel lymph node excision    UPPER GASTROINTESTINAL ENDOSCOPY  04/03/2017    UPPER GASTROINTESTINAL ENDOSCOPY N/A 11/06/2020    EGD BIOPSY performed by Suhas Nunes MD at [address] History     Tobacco Use    Smoking status: Former Smoker     Packs/day: 0.50     Years: 41.00     Pack years: 20.50     Types: Cigarettes     Start date: 1974     Quit date: 2021     Years since quittin.8    Smokeless tobacco: Never Used   Vaping Use    Vaping Use: Never used   Substance Use Topics    Alcohol use: No     Alcohol/week: 0.0 standard drinks    Drug use: Never       Chart reviewed and updated where appropriate for PMH, Fam, and Soc Hx.  _________________________________________________________  ROS: POSITIVE: As in the HPI. Otherwise Pertinent negatives are negative.    __________________________________________________________  Physical Exam   Constitutional:    She is oriented to person, place, and time. She appears well-developed and well-nourished. Eyes:    Conjunctivae are normal.    Pupils are equal, round, and reactive to light. EOMI. Cardiovascular:    Normal rate, regular rhythm and normal heart sounds. No murmur. No gallop and no friction rub. Pulmonary/Chest:    Effort normal and breath sounds normal.    No wheezes. No rales or rhonchi. Abdominal:    Soft. Bowel sounds are normal.    No distension. No tenderness. Musculoskeletal:    Normal range of motion. No joint swelling noted. No peripheral edema. Skin:    Skin is warm and dry. No rashes, No lesions. Some cracking/erythema at the corner of the mouth on the left. Pedunculated skin tag at the left thoracic flank near her bra line. Not irritated appearing at this time. Psychiatric:    She has a normal mood and affect. Normal groom and dress. No SI or HI.   ________________________________________________________    This note may have been created using dictation software.  Efforts were made to reduce errors, but some may persist.

## 2022-05-09 DIAGNOSIS — Z12.31 VISIT FOR SCREENING MAMMOGRAM: Primary | ICD-10-CM

## 2022-05-24 RX ORDER — GABAPENTIN 100 MG/1
100 CAPSULE ORAL NIGHTLY
Qty: 90 CAPSULE | Refills: 1 | Status: SHIPPED
Start: 2022-05-24 | End: 2022-07-25

## 2022-06-24 ASSESSMENT — ENCOUNTER SYMPTOMS
SHORTNESS OF BREATH: 0
VOMITING: 0
CHOKING: 0
ABDOMINAL PAIN: 0
NAUSEA: 0
BLOOD IN STOOL: 0
EYE PAIN: 0
RHINORRHEA: 0
TROUBLE SWALLOWING: 0
VOICE CHANGE: 0
ABDOMINAL DISTENTION: 0
CHEST TIGHTNESS: 0
ROS SKIN COMMENTS: DENIES BREAST SKIN CHANGES, ARM SWELLING, OR PALPABLE AXILLARY OR SUPRACLAVICULAR ADENOPATHY.
DIARRHEA: 0
BACK PAIN: 0
SORE THROAT: 0
EYE ITCHING: 0
EYES NEGATIVE: 1
APNEA: 0
COUGH: 0
EYE DISCHARGE: 0
COLOR CHANGE: 0
WHEEZING: 0
SINUS PAIN: 0
CONSTIPATION: 0

## 2022-06-24 NOTE — PROGRESS NOTES
Subjective:  Stage I ER/FL positive, HER-2/luis negative ID carcinoma of the right breast.  Oncotype DX recurrence score of 5. Patient ID: Jaja Dominique is a 57 y.o. female. KARLA Lindsay is here for a follow up visit of her Stage I ER/FL positive, HER-2/luis negative carcinoma of the right breast.    She underwent a right simple mastectomy, right axillary sentinel lymph node excision, right axillary dissection on March 2, 2016. Pathological evaluation demonstrated:  A. 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 Tumor Size: Size of Largest Invasive Carcinoma 2.0 cm. Margins of excision are negative for invasive carcinoma. Skin and nipple, negative for invasive carcinoma  B. Right sentinel lymph nodes #1: Two lymph nodes, negative for neoplasm;  C. [place] lymph node #2: Single lymph node, negative for neoplasm;  D. Upper inner chest wall mass: Invasive ductal carcinoma involving fibrovascular and neurovascular tissue, negative for lymph node. Pathologic Staging:  Primary tumor- pT 1c  Regional lymph nodes-(sn)pN 0(i-)  Distant metastases- pM x  Additional Pathologic Findings-invasive ductal carcinoma involving soft tissue of upper/inner quadrant chest    Additional note: The invasive carcinoma present in the specimen designated as upper/inner quadrant is discontinuous from the centrally located invasive carcinoma and does not involve lymphoid tissue. Tumor involves the inked margins of excision in this specimen. Intradepartmental consultation is obtained. Case discussed with Dr. Cardenas Case 3/7/2016. Oncotype DX recurrence score 5 (low risk). Radiation therapy: Completed 5/24/2016. Endocrine therapy:  Started on Arimidex in May 2016 per Dr. Teresita Miller Completed 5 year in April 2021.  1/17/18, Left mammogram [facility]):  Negative.  06/21/2021 Left screening mammogram:  Negative, BIRADS 1.  06/21/2021 DEXA bone density: Osteoporosis.   On calcium and vitamin D daily, Constitutional:       General: She is not in acute distress. Appearance: She is well-developed. She is not diaphoretic. Comments: ECOG once again remains stable at 0   HENT:      Head: Normocephalic and atraumatic. Mouth/Throat:      Pharynx: No oropharyngeal exudate. Eyes:      General: No scleral icterus. Right eye: No discharge. Left eye: No discharge. Conjunctiva/sclera: Conjunctivae normal.   Neck:      Thyroid: No thyromegaly. Vascular: No JVD. Trachea: No tracheal deviation. Cardiovascular:      Rate and Rhythm: Normal rate and regular rhythm. Heart sounds: Normal heart sounds. No murmur heard. No friction rub. No gallop. Pulmonary:      Effort: Pulmonary effort is normal. No respiratory distress or retractions. Breath sounds: Normal breath sounds. No stridor. Comments: Expiratory phase is moderately prolonged but stable. Chest:      Chest wall: No mass, lacerations, deformity, swelling, tenderness or edema. Breasts:     Breasts are asymmetrical.      Right: No inverted nipple, mass, nipple discharge, skin change, tenderness or supraclavicular adenopathy. Left: No inverted nipple, mass, nipple discharge, skin change, tenderness or supraclavicular adenopathy. Comments: Right chest scar intact. No evidence of recurrent disease. Good range of motion of the right upper extremity. No lymphedema appreciated. Abdominal:      General: There is no distension. Palpations: Abdomen is soft. Tenderness: There is no abdominal tenderness. There is no guarding or rebound. Musculoskeletal:         General: No tenderness or deformity. Normal range of motion. Right shoulder: Normal.      Left shoulder: Normal.      Cervical back: Normal range of motion and neck supple. Lymphadenopathy:      Cervical: No cervical adenopathy. Right cervical: No superficial, deep or posterior cervical adenopathy.      Left cervical: No superficial, deep or posterior cervical adenopathy. Upper Body:      Right upper body: No supraclavicular or pectoral adenopathy. Left upper body: No supraclavicular or pectoral adenopathy. Skin:     General: Skin is warm and dry. Coloration: Skin is not pale. Findings: No erythema or rash. Neurological:      Mental Status: She is alert and oriented to person, place, and time. Coordination: Coordination normal.   Psychiatric:         Behavior: Behavior normal.         Thought Content: Thought content normal.         Judgment: Judgment normal.     Assessment:      57 y.o. extremely pleasant female without unusual risk factors for carcinoma of the breast, who underwent a right simple mastectomy, right axillary sentinel lymph node excision, right axillary dissection on March 2, 2016. Pathological evaluation demonstrated:  A. 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 Tumor Size: Size of Largest Invasive Carcinoma 2.0 cm. Margins of excision are negative for invasive carcinoma. Skin and nipple, negative for invasive carcinoma  B. Right sentinel lymph nodes #1: Two lymph nodes, negative for neoplasm;  C. [place] lymph node #2: Single lymph node, negative for neoplasm;  D. Upper inner chest wall mass: Invasive ductal carcinoma involving fibrovascular and neurovascular tissue, negative for lymph node. Pathologic Staging:  Primary tumor- pT 1c  Regional lymph nodes-(sn)pN 0(i-)  Distant metastases- pM x  Additional Pathologic Findings-invasive ductal carcinoma involving soft tissue of upper/inner quadrant chest    Additional note: The invasive carcinoma present in the specimen designated as upper/inner quadrant is discontinuous from the centrally located invasive carcinoma and does not involve lymphoid tissue. Tumor involves the inked margins of excision in this specimen. Intradepartmental consultation is obtained.  Case discussed with Dr. Beatrice Menard 3/7/2016. Oncotype DX recurrence score 5 (low risk). Radiation therapy: Completed 5/24/2016. Endocrine therapy:  Started on Arimidex in May 2016 per Dr. Veronica Armenta Completed 5 year in April 2021.  1/17/18, Left mammogram [facility]):  Negative.  06/21/2021 Left screening mammogram:  Negative, BIRADS 1. DEXA bone density: Final report pending. On calcium and vitamin D daily, Prolia every 6 months. Clinical follow-up is without evidence of recurrent disease. The reported spasms of the right anterior chest wall is likely neuropathic. Will trial low-dose gabapentin.  07/25/2022 Left screening mammogram: Negative, BI-RADS 1. Imaging reviewed. Clinical follow-up is without evidence of recurrent disease. She has completed 5 years of endocrine therapy and is on active surveillance. The chest wall discomfort she had previously has since resolved for the most part. She no longer takes gabapentin. We reviewed the importance of self-exam and to call us in the event she would notice any changes. At this time, we will plan to see in 1 year with repeat left screening mammogram same day and as needed. She has complaints of chronic sinus drainage; previously saw ENT but has not followed up this time. We will trial Singulair 10 mg by mouth once daily. 30-day supply given. Offered referral to ENT which she declined at this time. Plan:        Continue monthly breast self examination. Bring any changes to your physician's attention. Routine screening imaging in July 2023. Calcium and vitamin D daily. Avoid excessive caffeine intake. Limit alcohol intake. Oncology appointments as scheduled with Oncologist-Dr. Veronica Armenta, primary care, and all specialties as directed  Follow up in 1 year with mammogram (Left) same day. Singulair 10 mg by mouth once daily.     I spent a total of 20 minutes on the date of the service which included preparing to see the patient, face-to-face patient care, completing clinical documentation, obtaining and/or reviewing separately obtained history, performing a medically appropriate examination, counseling and educating the patient/family/caregiver, ordering medications, tests, or procedures, communicating with other HCPs (not separately reported), independently interpreting results (not separately reported), communicating results to the patient/family/caregiver and care coordination (not separately reported). This document is generated, in part, by voice recognition software and thus syntax and grammatical errors are possible. Nikkie William, RN, MSN, APRN-CNP, [address] [place]  Advanced Oncology Certified Nurse Practitioner  Department of Breast Surgery  [facility] Breast [facility]/  Care in collaboration with Dr. Loren Adame.  [place]/Dr. Sylvia Hughes/Dr. Miguel Baumann APRN-CNP

## 2022-07-25 ENCOUNTER — OFFICE VISIT (OUTPATIENT)
Dept: BREAST CENTER | Age: 62
End: 2022-07-25
Payer: COMMERCIAL

## 2022-07-25 VITALS
SYSTOLIC BLOOD PRESSURE: 138 MMHG | WEIGHT: 140 LBS | DIASTOLIC BLOOD PRESSURE: 78 MMHG | BODY MASS INDEX: 27.48 KG/M2 | OXYGEN SATURATION: 98 % | RESPIRATION RATE: 18 BRPM | HEART RATE: 83 BPM | TEMPERATURE: 97.4 F | HEIGHT: 60 IN

## 2022-07-25 DIAGNOSIS — Z85.3 PERSONAL HISTORY OF BREAST CANCER: Primary | ICD-10-CM

## 2022-07-25 DIAGNOSIS — Z12.31 VISIT FOR SCREENING MAMMOGRAM: ICD-10-CM

## 2022-07-25 PROBLEM — J44.1 COPD WITH ACUTE EXACERBATION (HCC): Status: RESOLVED | Noted: 2022-05-05 | Resolved: 2022-07-25

## 2022-07-25 PROBLEM — J06.9 URI (UPPER RESPIRATORY INFECTION): Status: RESOLVED | Noted: 2021-06-14 | Resolved: 2022-07-25

## 2022-07-25 PROCEDURE — 99213 OFFICE O/P EST LOW 20 MIN: CPT | Performed by: NURSE PRACTITIONER

## 2022-07-25 RX ORDER — MONTELUKAST SODIUM 10 MG/1
10 TABLET ORAL NIGHTLY
Qty: 30 TABLET | Refills: 0 | Status: SHIPPED
Start: 2022-07-25 | End: 2022-08-22 | Stop reason: SDUPTHER

## 2022-07-25 ASSESSMENT — ENCOUNTER SYMPTOMS: SINUS PRESSURE: 1

## 2022-08-22 RX ORDER — MONTELUKAST SODIUM 10 MG/1
10 TABLET ORAL NIGHTLY
Qty: 90 TABLET | Refills: 1 | Status: SHIPPED | OUTPATIENT
Start: 2022-08-22

## 2022-10-10 ENCOUNTER — TELEPHONE (OUTPATIENT)
Dept: FAMILY MEDICINE CLINIC | Age: 62
End: 2022-10-10

## 2022-10-10 DIAGNOSIS — Z71.89 ACP (ADVANCE CARE PLANNING): Primary | ICD-10-CM

## 2022-10-10 NOTE — TELEPHONE ENCOUNTER
Patient requested referral for ACP ([address] [place] [address]) Clinical Specialist to assist with completing Advanced Directive (AD) forms. LSW received  phone call from patient requesting assistance with living will, called LSW because was given old flyer that had LSW name on it for assistance. LSW advised [facility] ([facility]) now has ACP Specialist to assist with completion of the AD forms-Health Care Power of [place] and Living Will. Patient interested in referral for assistance.

## 2022-10-11 ENCOUNTER — CLINICAL DOCUMENTATION (OUTPATIENT)
Dept: SPIRITUAL SERVICES | Age: 62
End: 2022-10-11

## 2022-10-11 NOTE — PROGRESS NOTES
Advance Care Planning   Ambulatory ACP Specialist Patient Outreach    Date:  10/11/2022  ACP Specialist:  Rev Zaida Ayala    Outreach call to patient in follow-up to ACP Specialist referral from: Michelle Christie MD    [x] PCP  [] Provider   [] Ambulatory Care Management [] Other for Reason:    [x] Advance Directive Assistance  [] Code Status Discussion  [] Complete Portable DNR Order  [] Discuss Goals of Care  [] Complete POST/MOST  [] Early ACP Decision-Making  [] Other    Date Referral Received:10/11    Today's Outreach:  [x] First   [] Second  [] Third                               Third outreach made by []  phone  [] email []   [employer]     Intervention:  [] Spoke with Patient  [x] Left VM requesting return call      Outcome: Left voicemail message. Call back in 2 weeks. Mailed ACP documents. Next Step:   [] ACP scheduled conversation  [x] Outreach again in one week               [x] Email / Mail ACP Info Sheets  [x] Email / Mail Advance Directive            [] Close Referral. Routing closure to referring provider/staff and to ACP Specialist . [] Closure Letter mailed to Patient with Invitation to Contact ACP Specialist if/when ready. Thank you for your referral and the opportunity to assist this patient with ACP. Please continue to send all advance care planning referrals through our 'Referral to [address] [place] [address] Specialist' referral pathway.

## 2022-10-14 ENCOUNTER — CLINICAL DOCUMENTATION (OUTPATIENT)
Dept: SPIRITUAL SERVICES | Age: 62
End: 2022-10-14

## 2022-10-14 NOTE — PROGRESS NOTES
Advance Care Planning   Advance Care Planning Note  Ambulatory Spiritual Care Services    Date:  10/14/2022    Received request from [facility] Provider. Consultation conversation participants:   Patient who understands ACP conversation     Goals of ACP Conversation:  Discuss advance care planning documents  Facilitate a discussion related to patient's goals of care as they align with the patient's values and beliefs. Health Care Decision Makers:    DaughterAdolfo   Summary:  Completed New Documents  Documented Next of Kin, per patient report    Advance Care Planning Documents (Patient Wishes)  Currently on file:   Healthcare Power of /Advance Directive Appointment of [address]  Living Will/Advance Directive    Assessment:   Patient is alert and able to carry on appropriate conversation. Patient could and did answer/ask questions appropriately. Interventions:  Provided education on documents for clarity and greater understanding  Discussed and provided education on state decision maker hierarchy  Assisted in the completion of documents according to patient's wishes at this time  Encouraged ongoing ACP conversation with future decision makers and loved ones    Care Preferences Communicated:     Hospitalization:  If the patient's health worsens and it becomes clear that the chance of recovery is unlikely,     the patient wants hospitalization. Ventilation:   If the patient, in their present state of health, suddenly became very ill and unable to breathe on their own,     the patient would NOT desire the use of a ventilator (breathing machine). If their health worsens and it becomes clear that the change of recovery is unlikely,     the patient would NOT desire the use of a ventilator (breathing machine).     Resuscitation:  In the event the patient's heart stopped as a result of an underlying serious health condition, the patient communicates a preference for resuscitative attempts (CPR). Outcomes:  ACP Discussion: Completed  New advance directive completed. Returned original document(s) to patient, as well as copies for distribution to appointed agents  Copy of advance directive given to staff to scan into medical record. Routed ACP note to attending provider or other IDT member. Teach Back Method used to verify the patient's and/or Healthcare Decision Maker's understanding of key information in the advance directive documents    Patient / Healthcare Decision Maker Instructions:  Return a copy of Advance Directive Form(s) to medical office. Upload completed ACP document(s) in their [employer] ACP page.   Review completed ACP document(s) and update, if needed, with changes health or future preferences    Electronically signed by Best Keyes on 10/14/2022 at 11:14 AM.

## 2022-10-20 ENCOUNTER — OFFICE VISIT (OUTPATIENT)
Dept: PODIATRY | Age: 62
End: 2022-10-20
Payer: COMMERCIAL

## 2022-10-20 VITALS — WEIGHT: 140 LBS | BODY MASS INDEX: 27.48 KG/M2 | HEIGHT: 60 IN

## 2022-10-20 DIAGNOSIS — B35.1 TINEA UNGUIUM: ICD-10-CM

## 2022-10-20 DIAGNOSIS — M20.42 HAMMER TOE OF LEFT FOOT: ICD-10-CM

## 2022-10-20 DIAGNOSIS — M79.672 LEFT FOOT PAIN: Primary | ICD-10-CM

## 2022-10-20 DIAGNOSIS — M21.619 BUNION: ICD-10-CM

## 2022-10-20 PROCEDURE — G8427 DOCREV CUR MEDS BY ELIG CLIN: HCPCS | Performed by: PODIATRIST

## 2022-10-20 PROCEDURE — G8417 CALC BMI ABV UP PARAM F/U: HCPCS | Performed by: PODIATRIST

## 2022-10-20 PROCEDURE — 99203 OFFICE O/P NEW LOW 30 MIN: CPT | Performed by: PODIATRIST

## 2022-10-20 PROCEDURE — 1036F TOBACCO NON-USER: CPT | Performed by: PODIATRIST

## 2022-10-20 PROCEDURE — 3017F COLORECTAL CA SCREEN DOC REV: CPT | Performed by: PODIATRIST

## 2022-10-20 PROCEDURE — G8484 FLU IMMUNIZE NO ADMIN: HCPCS | Performed by: PODIATRIST

## 2022-10-20 NOTE — PROGRESS NOTES
New patient in office with c/o left foot pain and edema x 1 month. Denies any injury. Julius Iglesias MD last seen 2022. [place] : 1960 Sex: female  Age: 57 y.o. Patient was referred by Calvin Brandt MD    CC:    Left foot pain with possible nail fungus    HPI:   This pleasant 68-year-old female patient referred me today left foot pain with possible nail fungus. Radiographs obtained today. Denies recent injury or trauma. Symptoms present over a month. Denies recent injury or trauma. Does states she has some tenderness on the inside of the left great toe and on the second hammertoe. Denies recent formal treatment or therapy. Does have likely nail fungus which she would like addressed. No additional pedal complaints at this time. ROS:  Const: Denies constitutional symptoms  Musculo: Denies symptoms other than stated above  Skin: Denies symptoms other than stated above       Current Outpatient Medications:     ciclopirox (PENLAC) 8 % solution, Apply once daily all toenails. , Disp: 6 mL, Rfl: 2    montelukast (SINGULAIR) 10 MG tablet, Take 1 tablet by mouth nightly, Disp: 90 tablet, Rfl: 1    lisinopril (PRINIVIL;ZESTRIL) 5 MG tablet, take 1 tablet by mouth once daily, Disp: 90 tablet, Rfl: 3    omeprazole (PRILOSEC) 20 MG delayed release capsule, take 1 capsule by mouth once daily with breakfast, Disp: 90 capsule, Rfl: 3    albuterol sulfate HFA (VENTOLIN HFA) 108 (90 Base) MCG/ACT inhaler, Inhale 2 puffs into the lungs 4 times daily as needed for Wheezing, Disp: 1 Inhaler, Rfl: 1    acetaminophen (TYLENOL) 325 MG tablet, Take 650 mg by mouth every 6 hours as needed for Pain, Disp: , Rfl:     Cholecalciferol (VITAMIN D3 PO), Take 2,000 Units by mouth daily, Disp: , Rfl:   Allergies   Allergen Reactions    Codeine Nausea And Vomiting       Past Medical History:   Diagnosis Date    Anemia     follows with [facility] ([place], [place]) / per patient her last counts were good / see  Eugene Burton notes in [facility] dated 10/8/2020    Arthritis     Blood in stool     Breast cancer ([facility] [address] 2015    right breast    Cancer ([facility] [address] 2015    breast right, treated with surgery and radiation and po medication    Dry eye syndrome     GERD (gastroesophageal reflux disease)     Hyperlipidemia     Hypertension     Osteoporosis     PONV (postoperative nausea and vomiting)     URI (upper respiratory infection) 06/14/2021           Vitals:    10/20/22 1416   Weight: 140 lb (63.5 kg)   Height: 5' (1.524 m)       Work History/Social History: Foot and ankle history:     Focused Lower Extremity Physical Exam:    Neurovascular examination:    Dorsalis Pedis palpable bilateral.  Posterior tibialis palpable bilateral.    Capillary Refill Time:  Immediate return  Hair growth:  Symmetrical and bilateral   Skin:  Not atrophic  Edema: No edema bilateral feet or ankles. Neurologic:  Light touch intact bilateral.      Musculoskeletal/ Orthopedic examination:    Equinis: Absent bilateral  Dorsiflexion, plantarflexion, inversion, eversion bilateral 5 out of 5 muscle strength  Wiggling toes  Negative Homans  Bunion deformity bilateral worse on the left. No significant pain overlying medial eminence. There is flexible hammertoe second digit left foot. Mild tenderness palpation overlying second digit hammertoe. Negative Lachman second metatarsal phalangeal joint. Dermatology examination:    No open skin lesions or abrasions bilateral lower extremity. Toenails 1 through 5 subungual debris with mycotic nails noted. No erythema no ingrown nail. Assessment and Plan:  Carmita Garcia was seen today for foot pain and foot swelling. Diagnoses and all orders for this visit:    Left foot pain  -     XR FOOT LEFT (MIN 3 VIEWS); Future    Bunion    Hammer toe of left foot    Other orders  -     ciclopirox (PENLAC) 8 % solution; Apply once daily all toenails.       New referral clinical tinea ongoing and bunion with hammertoes left foot  Radiographs left foot reviewed    No acute osseous abnormality. Hallux valgus deformity great toe. Small dorsal calcaneal spur. I did recommend offloading padding between left great toe and left second toe. I did recommend wide well supported walking shoes. I would recommend conservative treatment at this time. We did also discussed topical Penlac 8% once daily all toenails for tinea unguium. Use of alcohol removing agent once weekly. Follow-up in office 6 weeks. Return in about 6 weeks (around 12/1/2022). Seen By:  Yolanda Alvarez DPM      Document was created using voice recognition software. Note was reviewed, however may contain grammatical errors.

## 2022-10-25 ENCOUNTER — CLINICAL DOCUMENTATION (OUTPATIENT)
Dept: SPIRITUAL SERVICES | Age: 62
End: 2022-10-25

## 2022-11-28 ENCOUNTER — OFFICE VISIT (OUTPATIENT)
Dept: FAMILY MEDICINE CLINIC | Age: 62
End: 2022-11-28
Payer: COMMERCIAL

## 2022-11-28 VITALS
WEIGHT: 142 LBS | BODY MASS INDEX: 27.88 KG/M2 | SYSTOLIC BLOOD PRESSURE: 136 MMHG | HEART RATE: 88 BPM | OXYGEN SATURATION: 99 % | TEMPERATURE: 97.9 F | DIASTOLIC BLOOD PRESSURE: 84 MMHG | HEIGHT: 60 IN

## 2022-11-28 DIAGNOSIS — B35.9 RINGWORM: ICD-10-CM

## 2022-11-28 DIAGNOSIS — Z87.891 PERSONAL HISTORY OF TOBACCO USE: ICD-10-CM

## 2022-11-28 DIAGNOSIS — I10 ESSENTIAL HYPERTENSION: ICD-10-CM

## 2022-11-28 DIAGNOSIS — Z23 NEED FOR INFLUENZA VACCINATION: ICD-10-CM

## 2022-11-28 DIAGNOSIS — Z91.09 ENVIRONMENTAL ALLERGIES: Primary | ICD-10-CM

## 2022-11-28 PROCEDURE — 3074F SYST BP LT 130 MM HG: CPT | Performed by: FAMILY MEDICINE

## 2022-11-28 PROCEDURE — 1036F TOBACCO NON-USER: CPT | Performed by: FAMILY MEDICINE

## 2022-11-28 PROCEDURE — 90674 CCIIV4 VAC NO PRSV 0.5 ML IM: CPT | Performed by: FAMILY MEDICINE

## 2022-11-28 PROCEDURE — 90471 IMMUNIZATION ADMIN: CPT | Performed by: FAMILY MEDICINE

## 2022-11-28 PROCEDURE — 99214 OFFICE O/P EST MOD 30 MIN: CPT | Performed by: FAMILY MEDICINE

## 2022-11-28 PROCEDURE — G8417 CALC BMI ABV UP PARAM F/U: HCPCS | Performed by: FAMILY MEDICINE

## 2022-11-28 PROCEDURE — 3078F DIAST BP <80 MM HG: CPT | Performed by: FAMILY MEDICINE

## 2022-11-28 PROCEDURE — G0296 VISIT TO DETERM LDCT ELIG: HCPCS | Performed by: FAMILY MEDICINE

## 2022-11-28 PROCEDURE — G8427 DOCREV CUR MEDS BY ELIG CLIN: HCPCS | Performed by: FAMILY MEDICINE

## 2022-11-28 PROCEDURE — 3017F COLORECTAL CA SCREEN DOC REV: CPT | Performed by: FAMILY MEDICINE

## 2022-11-28 PROCEDURE — G8482 FLU IMMUNIZE ORDER/ADMIN: HCPCS | Performed by: FAMILY MEDICINE

## 2022-11-28 RX ORDER — KETOCONAZOLE 20 MG/G
CREAM TOPICAL
Qty: 30 G | Refills: 1 | Status: SHIPPED | OUTPATIENT
Start: 2022-11-28

## 2022-11-28 NOTE — PATIENT INSTRUCTIONS

## 2022-11-28 NOTE — PROGRESS NOTES
[facility]  Office Progress Note - Dr. Sloane Landaverde  11/28/22    CC:   Chief Complaint   Patient presents with    Skin Problem     Nickel sized patch of skin on back of right upper arm. Noticed 2 months. Denies itching, burning or pain associated with this skin patch. Sinus Problem     Pt currently on Singulair and does not feel that it is helping with her sinus issues. /84   Pulse 88   Temp 97.9 °F (36.6 °C) (Temporal)   Ht 5' (1.524 m)   Wt 142 lb (64.4 kg)   SpO2 99%   BMI 27.73 kg/m²   Wt Readings from Last 3 Encounters:   11/28/22 142 lb (64.4 kg)   10/20/22 140 lb (63.5 kg)   07/25/22 140 lb (63.5 kg)       HPI: her eyes have been itchy  No redness or drainage. Shes tried claritin, zyrtec, flonase, singulair for sneezing, itchy watery eyes, couging, ears feeling clogged, PND.   _________________________________________________________    Assessment / Desiree Lawson was seen today for skin problem and sinus problem. Diagnoses and all orders for this visit:    Environmental allergies  Continue Singulair. Flonase and Zyrtec. If symptoms not improving after 1 month of use, follow-up    Need for influenza vaccination  -     Influenza, FLUCELVAX, (age 10 mo+), IM, Preservative Free, 0.5 mL    Personal history of tobacco use  -     SC VISIT TO DISCUSS LUNG CA SCREEN W LDCT  -     CT Lung Screen (Annual); Future  She wishes to do an annual CT lung screening. She continues to smoke. Essential hypertension  -     CBC with Auto Differential; Future  -     Comprehensive Metabolic Panel; Future  -     Lipid Panel; Future  Blood pressure is at goal today    Ringworm  -     ketoconazole (NIZORAL) 2 % cream; Apply topically daily for 2 weeks. Return in about 6 months (around 5/28/2023). or as scheduled. Patient counseled to follow up sooner or seek more acute care if symptoms worsening or not improving according to plan.      Electronically signed by Claude Jackson Alley Packer MD on 11/28/2022    _________________________________________________________  Current Outpatient Medications on File Prior to Visit   Medication Sig Dispense Refill    ciclopirox (PENLAC) 8 % solution Apply once daily all toenails. 6 mL 2    montelukast (SINGULAIR) 10 MG tablet Take 1 tablet by mouth nightly 90 tablet 1    lisinopril (PRINIVIL;ZESTRIL) 5 MG tablet take 1 tablet by mouth once daily 90 tablet 3    omeprazole (PRILOSEC) 20 MG delayed release capsule take 1 capsule by mouth once daily with breakfast 90 capsule 3    albuterol sulfate HFA (VENTOLIN HFA) 108 (90 Base) MCG/ACT inhaler Inhale 2 puffs into the lungs 4 times daily as needed for Wheezing 1 Inhaler 1    acetaminophen (TYLENOL) 325 MG tablet Take 650 mg by mouth every 6 hours as needed for Pain      Cholecalciferol (VITAMIN D3 PO) Take 2,000 Units by mouth daily       No current facility-administered medications on file prior to visit.        Patient Active Problem List   Diagnosis Code    Chronic otitis externa of right ear H60.61    Essential hypertension I10    Gastroesophageal reflux disease K21.9    Osteoporosis M81.0    Mild episode of recurrent major depressive disorder (HCC) F33.0    History of breast cancer Z85.3     _________________________________________________________  Past Medical History:   Diagnosis Date    Anemia     follows with [facility] / per patient her last counts were good / see Dr. Abbie Humphrey notes in epic dated 10/8/2020    Arthritis     Blood in stool     Breast cancer ([facility] [address] 2015    right breast    Cancer ([facility] [address] 2015    breast right, treated with surgery and radiation and po medication    Dry eye syndrome     GERD (gastroesophageal reflux disease)     Hyperlipidemia     Hypertension     Osteoporosis     PONV (postoperative nausea and vomiting)     URI (upper respiratory infection) 06/14/2021       Family History   Problem Relation Age of Onset    High Blood Pressure Mother     Asthma Mother     Breast Cancer Mother     Cancer Mother          breast cancer    Heart Disease Father     High Blood Pressure Sister     High Blood Pressure Sister     Breast Cancer Maternal Aunt         breast cancer    Cancer Other         Maternal Aunt Esophageal cancer       Past Surgical History:   Procedure Laterality Date    APPENDECTOMY      CARDIOVASCULAR STRESS TEST      CHOLECYSTECTOMY  1984    COLONOSCOPY  2018    COLONOSCOPY N/A 2018    COLONOSCOPY POLYPECTOMY SNARE/COLD BIOPSY performed by Patricia Weems MD at [facility] N/A 2020    COLONOSCOPY DIAGNOSTIC performed by Patricia Weems MD at [facility], COLON, DIAGNOSTIC      HERNIA REPAIR  ,    abdominal    HYSTERECTOMY, TOTAL ABDOMINAL (CERVIX REMOVED) N/A     MASTECTOMY Right 2015    simple, blue dye, with right axillary sentinel lymph node excision    UPPER GASTROINTESTINAL ENDOSCOPY  2017    UPPER GASTROINTESTINAL ENDOSCOPY N/A 2020    EGD BIOPSY performed by Patricia Weems MD at [facility] History     Tobacco Use    Smoking status: Former     Packs/day: 0.50     Years: 41.00     Pack years: 20.50     Types: Cigarettes     Start date: 1974     Quit date: 2021     Years since quittin.4    Smokeless tobacco: Never   Vaping Use    Vaping Use: Never used   Substance Use Topics    Alcohol use: No     Alcohol/week: 0.0 standard drinks    Drug use: Never       Chart reviewed and updated where appropriate for PMH, Fam, and Soc Hx.  _________________________________________________________  ROS: POSITIVE: As in the HPI. Otherwise Pertinent negatives are negative.    __________________________________________________________  Physical Exam   Constitutional:    She is oriented to person, place, and time. She appears well-developed and well-nourished. Eyes:    Conjunctivae are normal.    Pupils are equal, round, and reactive to light. EOMI.    Neck: Cardiovascular:    Normal rate, regular rhythm and normal heart sounds. No murmur. No gallop and no friction rub. Pulmonary/Chest:    Effort normal and breath sounds normal.  Barrel chest.   No wheezes. No rales or rhonchi. Abdominal:    Soft. Bowel sounds are normal.    No distension. No tenderness. Musculoskeletal:    Normal range of motion. No joint swelling noted. No peripheral edema. Skin:    Skin is warm and dry. Quarter sized patch of tinea corporis on the right posterior arm   No rashes, No lesions. Psychiatric:    She has a anxious mood and affect. Normal groom and dress. No SI or HI.   ________________________________________________________    This note may have been created using dictation software. Efforts were made to reduce errors, but some may persist.   Discussed with the patient the current USPSTF guidelines released March 9, 2021 for screening for lung cancer. For adults aged 48 to [de-identified] years who have a 20 pack-year smoking history and currently smoke or have quit within the past 15 years the grade B recommendation is to:  Screen for lung cancer with low-dose computed tomography (LDCT) every year. Stop screening once a person has not smoked for 15 years or has a health problem that limits life expectancy or the ability to have lung surgery. The patient  reports that she quit smoking about 17 months ago. Her smoking use included cigarettes. She started smoking about 48 years ago. She has a 20.50 pack-year smoking history. She has never used smokeless tobacco.. Discussed with patient the risks and benefits of screening, including over-diagnosis, false positive rate, and total radiation exposure. The patient currently exhibits no signs or symptoms suggestive of lung cancer. Discussed with patient the importance of compliance with yearly annual lung cancer screenings and willingness to undergo diagnosis and treatment if screening scan is positive.   In addition, the patient was counseled regarding the importance of remaining smoke free and/or total smoking cessation.     Also reviewed the following if the patient has Medicare that as of February 10, 2022, Medicare only covers LDCT screening in patients aged 51-72 with at least a 20 pack-year smoking history who currently smoke or have quit in the last 15 years

## 2022-11-29 DIAGNOSIS — I10 ESSENTIAL HYPERTENSION: ICD-10-CM

## 2022-11-29 LAB
ALBUMIN SERPL-MCNC: 4.1 G/DL (ref 3.5–5.2)
ALP BLD-CCNC: 118 U/L (ref 35–104)
ALT SERPL-CCNC: 9 U/L (ref 0–32)
ANION GAP SERPL CALCULATED.3IONS-SCNC: 13 MMOL/L (ref 7–16)
AST SERPL-CCNC: 17 U/L (ref 0–31)
BASOPHILS ABSOLUTE: 0.04 E9/L (ref 0–0.2)
BASOPHILS RELATIVE PERCENT: 0.6 % (ref 0–2)
BILIRUB SERPL-MCNC: 0.2 MG/DL (ref 0–1.2)
BUN BLDV-MCNC: 15 MG/DL (ref 6–23)
CALCIUM SERPL-MCNC: 9.8 MG/DL (ref 8.6–10.2)
CHLORIDE BLD-SCNC: 102 MMOL/L (ref 98–107)
CHOLESTEROL, TOTAL: 248 MG/DL (ref 0–199)
CO2: 23 MMOL/L (ref 22–29)
CREAT SERPL-MCNC: 1 MG/DL (ref 0.5–1)
EOSINOPHILS ABSOLUTE: 0.18 E9/L (ref 0.05–0.5)
EOSINOPHILS RELATIVE PERCENT: 2.5 % (ref 0–6)
GFR SERPL CREATININE-BSD FRML MDRD: >60 ML/MIN/1.73
GLUCOSE BLD-MCNC: 88 MG/DL (ref 74–99)
HCT VFR BLD CALC: 36.2 % (ref 34–48)
HDLC SERPL-MCNC: 30 MG/DL
HEMOGLOBIN: 10.9 G/DL (ref 11.5–15.5)
IMMATURE GRANULOCYTES #: 0.02 E9/L
IMMATURE GRANULOCYTES %: 0.3 % (ref 0–5)
LDL CHOLESTEROL CALCULATED: 148 MG/DL (ref 0–99)
LYMPHOCYTES ABSOLUTE: 1.02 E9/L (ref 1.5–4)
LYMPHOCYTES RELATIVE PERCENT: 14.1 % (ref 20–42)
MCH RBC QN AUTO: 24.2 PG (ref 26–35)
MCHC RBC AUTO-ENTMCNC: 30.1 % (ref 32–34.5)
MCV RBC AUTO: 80.3 FL (ref 80–99.9)
MONOCYTES ABSOLUTE: 0.56 E9/L (ref 0.1–0.95)
MONOCYTES RELATIVE PERCENT: 7.7 % (ref 2–12)
NEUTROPHILS ABSOLUTE: 5.43 E9/L (ref 1.8–7.3)
NEUTROPHILS RELATIVE PERCENT: 74.8 % (ref 43–80)
PDW BLD-RTO: 14.7 FL (ref 11.5–15)
PLATELET # BLD: 389 E9/L (ref 130–450)
PMV BLD AUTO: 9.6 FL (ref 7–12)
POTASSIUM SERPL-SCNC: 5.1 MMOL/L (ref 3.5–5)
RBC # BLD: 4.51 E12/L (ref 3.5–5.5)
SODIUM BLD-SCNC: 138 MMOL/L (ref 132–146)
TOTAL PROTEIN: 7.7 G/DL (ref 6.4–8.3)
TRIGL SERPL-MCNC: 352 MG/DL (ref 0–149)
VLDLC SERPL CALC-MCNC: 70 MG/DL
WBC # BLD: 7.3 E9/L (ref 4.5–11.5)

## 2022-12-01 ENCOUNTER — OFFICE VISIT (OUTPATIENT)
Dept: PODIATRY | Age: 62
End: 2022-12-01
Payer: COMMERCIAL

## 2022-12-01 DIAGNOSIS — M79.672 LEFT FOOT PAIN: Primary | ICD-10-CM

## 2022-12-01 DIAGNOSIS — M21.619 BUNION: ICD-10-CM

## 2022-12-01 PROCEDURE — G8417 CALC BMI ABV UP PARAM F/U: HCPCS | Performed by: PODIATRIST

## 2022-12-01 PROCEDURE — G8482 FLU IMMUNIZE ORDER/ADMIN: HCPCS | Performed by: PODIATRIST

## 2022-12-01 PROCEDURE — 1036F TOBACCO NON-USER: CPT | Performed by: PODIATRIST

## 2022-12-01 PROCEDURE — 99213 OFFICE O/P EST LOW 20 MIN: CPT | Performed by: PODIATRIST

## 2022-12-01 PROCEDURE — 3017F COLORECTAL CA SCREEN DOC REV: CPT | Performed by: PODIATRIST

## 2022-12-01 PROCEDURE — G8427 DOCREV CUR MEDS BY ELIG CLIN: HCPCS | Performed by: PODIATRIST

## 2022-12-01 NOTE — PROGRESS NOTES
Patient in office to follow up with left foot pain and nail fungus. Continues to have pain to left ankle. Would like to discuss a brace for ankle. CC:    Follow-up nail fungus    HPI:   Follow-up nail fungus. States she has been using topical Penlac. States occasionally she uses an over-the-counter ankle supported wrap which does help. No significant ankle pain today. No recent injury. No significant foot or ankle swelling. No additional pedal complaints today. ROS:  Const: Denies constitutional symptoms  Musculo: Denies symptoms other than stated above  Skin: Denies symptoms other than stated above       Current Outpatient Medications:     ketoconazole (NIZORAL) 2 % cream, Apply topically daily for 2 weeks. , Disp: 30 g, Rfl: 1    ciclopirox (PENLAC) 8 % solution, Apply once daily all toenails. , Disp: 6 mL, Rfl: 2    montelukast (SINGULAIR) 10 MG tablet, Take 1 tablet by mouth nightly, Disp: 90 tablet, Rfl: 1    lisinopril (PRINIVIL;ZESTRIL) 5 MG tablet, take 1 tablet by mouth once daily, Disp: 90 tablet, Rfl: 3    omeprazole (PRILOSEC) 20 MG delayed release capsule, take 1 capsule by mouth once daily with breakfast, Disp: 90 capsule, Rfl: 3    albuterol sulfate HFA (VENTOLIN HFA) 108 (90 Base) MCG/ACT inhaler, Inhale 2 puffs into the lungs 4 times daily as needed for Wheezing, Disp: 1 Inhaler, Rfl: 1    acetaminophen (TYLENOL) 325 MG tablet, Take 650 mg by mouth every 6 hours as needed for Pain, Disp: , Rfl:     Cholecalciferol (VITAMIN D3 PO), Take 2,000 Units by mouth daily, Disp: , Rfl:   Allergies   Allergen Reactions    Codeine Nausea And Vomiting       Past Medical History:   Diagnosis Date    Anemia     follows with [facility] / per patient her last counts were good / see Dr. Merlinda Nakayama notes in epic dated 10/8/2020    Arthritis     Blood in stool     Breast cancer ([facility] [address] 2015    right breast    Cancer ([facility] [address] 2015    breast right, treated with surgery and radiation and po medication    Dry eye syndrome     GERD (gastroesophageal reflux disease)     Hyperlipidemia     Hypertension     Osteoporosis     PONV (postoperative nausea and vomiting)     URI (upper respiratory infection) 06/14/2021           There were no vitals filed for this visit. Work History/Social History: Foot and ankle history:     Focused Lower Extremity Physical Exam:    Neurovascular examination:    Dorsalis Pedis palpable bilateral.  Posterior tibialis palpable bilateral.    Capillary Refill Time:  Immediate return  Hair growth:  Symmetrical and bilateral   Skin:  Not atrophic  Edema: No edema bilateral feet or ankles. Neurologic:  Light touch intact bilateral.      Musculoskeletal/ Orthopedic examination:    Equinis: Absent bilateral  Dorsiflexion, plantarflexion, inversion, eversion bilateral 5 out of 5 muscle strength  Wiggling toes  Negative Homans  Bunion bilateral.  No pain foot or ankle. Dermatology examination:      Toenails 1 through 5 subungual debris with mycotic nails noted. No erythema. No ingrown or infected nail. No open wounds. Assessment and Plan:  Carmita Garcia was seen today for follow-up, nail problem and foot pain. Diagnoses and all orders for this visit:    Left foot pain    Bunion      Radiographs left foot reviewed    No acute osseous abnormality. Hallux valgus deformity great toe. Small dorsal calcaneal spur. Follow-up tinea unguium  We did discuss topical versus oral treatment she would like to avoid oral treatment at this time. Continue topical Penlac 8% toenails 1-5 once daily. Removed with alcohol removing agent once weekly. Has been using over-the-counter ankle would recommend over-the-counter ankle supported wrap. Clinically no ankle pain but does state occasional ankle tenderness. Any continued symptoms would consider repeat radiographs. Follow-up 4 months        Return in about 4 months (around 4/1/2023).       Seen By:  Martin Coy DPM      Document was created using voice recognition software. Note was reviewed, however may contain grammatical errors.

## 2022-12-05 DIAGNOSIS — D64.9 ANEMIA, UNSPECIFIED TYPE: Primary | ICD-10-CM

## 2022-12-09 DIAGNOSIS — D64.9 ANEMIA, UNSPECIFIED TYPE: ICD-10-CM

## 2022-12-09 LAB
CONTROL: NORMAL
HEMOCCULT STL QL: POSITIVE

## 2022-12-12 ENCOUNTER — TELEPHONE (OUTPATIENT)
Dept: FAMILY MEDICINE CLINIC | Age: 62
End: 2022-12-12

## 2022-12-12 DIAGNOSIS — D64.9 ANEMIA, UNSPECIFIED TYPE: Primary | ICD-10-CM

## 2022-12-12 DIAGNOSIS — R19.5 POSITIVE FIT (FECAL IMMUNOCHEMICAL TEST): ICD-10-CM

## 2022-12-12 DIAGNOSIS — Z12.11 COLON CANCER SCREENING: ICD-10-CM

## 2022-12-12 NOTE — TELEPHONE ENCOUNTER
Does the rash look any less red? Maybe a little bit more skin color now. Cream will usually kill the fungus but then can take a month or so for the skin to start looking normal again. If it is spreading or getting bigger, then we definitely ought to try something else but if it is looking slightly better more tan, less red, that would probably be expected at this point.

## 2022-12-14 NOTE — TELEPHONE ENCOUNTER
Patient states that there is no change. It has not gotten larger and does not appear to be spreading. States that it is just there. Same amount of redness. Area does not itch and it does not hurt. She will continue to use cream. She wanted to know if it could be eczema or psoriasis.

## 2022-12-15 NOTE — TELEPHONE ENCOUNTER
Could be eczema. Much less likely psoriasis. Still think ringworm is probably accurate. I would use that cream for 2 weeks steady and then give it another 2 weeks to look like normal skin again. If not improved at all after 1 month, then we should reevaluate.

## 2022-12-19 ENCOUNTER — OFFICE VISIT (OUTPATIENT)
Dept: SURGERY | Age: 62
End: 2022-12-19
Payer: COMMERCIAL

## 2022-12-19 VITALS
BODY MASS INDEX: 27.48 KG/M2 | DIASTOLIC BLOOD PRESSURE: 72 MMHG | RESPIRATION RATE: 18 BRPM | OXYGEN SATURATION: 99 % | SYSTOLIC BLOOD PRESSURE: 128 MMHG | TEMPERATURE: 98 F | HEART RATE: 94 BPM | HEIGHT: 60 IN | WEIGHT: 140 LBS

## 2022-12-19 DIAGNOSIS — R19.7 DIARRHEA DUE TO MALABSORPTION: ICD-10-CM

## 2022-12-19 DIAGNOSIS — R11.0 NAUSEA: ICD-10-CM

## 2022-12-19 DIAGNOSIS — K90.9 DIARRHEA DUE TO MALABSORPTION: ICD-10-CM

## 2022-12-19 DIAGNOSIS — K92.1 BLOOD IN STOOL: Primary | ICD-10-CM

## 2022-12-19 PROCEDURE — 3017F COLORECTAL CA SCREEN DOC REV: CPT | Performed by: SURGERY

## 2022-12-19 PROCEDURE — G8427 DOCREV CUR MEDS BY ELIG CLIN: HCPCS | Performed by: SURGERY

## 2022-12-19 PROCEDURE — 3074F SYST BP LT 130 MM HG: CPT | Performed by: SURGERY

## 2022-12-19 PROCEDURE — 1036F TOBACCO NON-USER: CPT | Performed by: SURGERY

## 2022-12-19 PROCEDURE — 3078F DIAST BP <80 MM HG: CPT | Performed by: SURGERY

## 2022-12-19 PROCEDURE — G8417 CALC BMI ABV UP PARAM F/U: HCPCS | Performed by: SURGERY

## 2022-12-19 PROCEDURE — G8482 FLU IMMUNIZE ORDER/ADMIN: HCPCS | Performed by: SURGERY

## 2022-12-19 PROCEDURE — 99214 OFFICE O/P EST MOD 30 MIN: CPT | Performed by: SURGERY

## 2022-12-19 RX ORDER — CHOLESTYRAMINE 4 G/9G
1 POWDER, FOR SUSPENSION ORAL 2 TIMES DAILY
Qty: 90 PACKET | Refills: 3 | Status: SHIPPED | OUTPATIENT
Start: 2022-12-19

## 2022-12-19 NOTE — PROGRESS NOTES
General Surgery History and Physical    Patient's Name/Date of Birth: Lainey Chapa / 1960    Date: 12/19/2022    PCP: Nitesh Sanchez MD    Referring Physician:   Eliana Schaefer  [phone]    CHIEF COMPLAINT:    Chief Complaint   Patient presents with    Colonoscopy     Positive FIT test     EGD         HISTORY OF PRESENT ILLNESS:    Lainey Chapa is an 57 y.o. female who presents with anemia and a positive FIT. She has a history of small gastric ulcer. She said she has a sensitive stomach. She is having nausea. She has a h/o Nissen. No vomiting, she has diarrhea, no constipation. She has had this issue for years. She had a cholecystectomy in the 80s. No changes in stool caliber. No bloody or black stools. No unintentional weight loss. No family history of colon cancer. The patient has a known history of: no known risk factors. The patient has had a colonoscopy before - I did a scope in 2018 that showed diverticulosis. I could not complete her scope in 2020. She had an EGD at that time as well.      Past Medical History:   Past Medical History:   Diagnosis Date    Anemia     follows with [facility] / per patient her last counts were good / see Dr. Merlinda Nakayama notes in epic dated 10/8/2020    Arthritis     Blood in stool     Breast cancer ([facility] [address] 2015    right breast    Cancer ([facility] [address] 2015    breast right, treated with surgery and radiation and po medication    Dry eye syndrome     GERD (gastroesophageal reflux disease)     Hyperlipidemia     Hypertension     Osteoporosis     PONV (postoperative nausea and vomiting)     URI (upper respiratory infection) 06/14/2021        Past Surgical History:   Past Surgical History:   Procedure Laterality Date    APPENDECTOMY      CARDIOVASCULAR STRESS TEST      CHOLECYSTECTOMY  1984    COLONOSCOPY  09/17/2018    COLONOSCOPY N/A 09/17/2018    COLONOSCOPY POLYPECTOMY SNARE/COLD BIOPSY performed by Seven Sawant MD at [address] [place] [address]. 2020    COLONOSCOPY DIAGNOSTIC performed by Althea Rojas MD at [facility], COLON, DIAGNOSTIC      HERNIA REPAIR  ,    abdominal    HYSTERECTOMY, TOTAL ABDOMINAL (CERVIX REMOVED) N/A 2009    MASTECTOMY Right 2015    simple, blue dye, with right axillary sentinel lymph node excision    UPPER GASTROINTESTINAL ENDOSCOPY  2017    UPPER GASTROINTESTINAL ENDOSCOPY N/A 2020    EGD BIOPSY performed by Althea Rojas MD at [facility] ENDOSCOPY        Allergies: Codeine     Medications:   Current Outpatient Medications   Medication Sig Dispense Refill    ketoconazole (NIZORAL) 2 % cream Apply topically daily for 2 weeks. 30 g 1    ciclopirox (PENLAC) 8 % solution Apply once daily all toenails. 6 mL 2    montelukast (SINGULAIR) 10 MG tablet Take 1 tablet by mouth nightly 90 tablet 1    lisinopril (PRINIVIL;ZESTRIL) 5 MG tablet take 1 tablet by mouth once daily 90 tablet 3    omeprazole (PRILOSEC) 20 MG delayed release capsule take 1 capsule by mouth once daily with breakfast 90 capsule 3    albuterol sulfate HFA (VENTOLIN HFA) 108 (90 Base) MCG/ACT inhaler Inhale 2 puffs into the lungs 4 times daily as needed for Wheezing 1 Inhaler 1    acetaminophen (TYLENOL) 325 MG tablet Take 650 mg by mouth every 6 hours as needed for Pain      Cholecalciferol (VITAMIN D3 PO) Take 2,000 Units by mouth daily       No current facility-administered medications for this visit.          Social History:   Social History     Tobacco Use    Smoking status: Every Day     Packs/day: 0.25     Years: 41.00     Pack years: 10.25     Types: Cigarettes     Start date: 1974     Last attempt to quit: 2021     Years since quittin.5    Smokeless tobacco: Never   Substance Use Topics    Alcohol use: No     Alcohol/week: 0.0 standard drinks        Family History:   Family History   Problem Relation Age of Onset    High Blood Pressure Mother     Asthma Mother     Breast Cancer Mother Cancer Mother          breast cancer    Heart Disease Father     High Blood Pressure Sister     High Blood Pressure Sister     Breast Cancer Maternal Aunt         breast cancer    Cancer Other         Maternal Aunt Esophageal cancer       REVIEW OF SYSTEMS:    Constitutional: negative  Eyes: negative  Ears, nose, mouth, throat, and face: negative  Respiratory: negative  Cardiovascular: negative  Gastrointestinal: as in HPI  Genitourinary:negative  Integument/breast: negative  Hematologic/lymphatic: negative  Musculoskeletal:negative  Neurological: negative  Allergic/Immunologic: negative    PHYSICAL EXAM   /72   Pulse 94   Temp 98 °F (36.7 °C) (Infrared)   Resp 18   Ht 5' (1.524 m)   Wt 140 lb (63.5 kg)   SpO2 99%   BMI 27.34 kg/m²     General appearance: alert, cooperative and in no acute distress. Eyes: Grossly normal   Lungs: normal work of breathing  Heart: regular rate  Abdomen:  soft, non-tender, non-distended  Skin: No skin abnormalities  Neurologic: Alert and oriented x 3. Grossly normal  Musculoskeletal: No edema. ASSESSMENT AND PLAN:     Ninfa Liang is an 57 y.o. female who presents for a colonoscopy with diarrhea, upper abdominal pain, anemia     I will set the patient up for an EGD and colonoscopy, possible biopsy, possible polypectomy. I explained the risks including but not limited to bleeding, perforation leading to possible surgery, or infection. The benefits, alternatives, and potential complications associated with the above procedure to be performed and transfusions when applicable with the patient/responsible person prior to the procedure.      Kalyani    Physician Signature: Electronically signed by Shukri Leung MD, General Surgery    Send copy of H&P to PCP, Kanchan Chapa MD and referring physician, Wilber Hunt,*

## 2022-12-20 ENCOUNTER — TELEPHONE (OUTPATIENT)
Dept: SURGERY | Age: 62
End: 2022-12-20

## 2022-12-20 PROBLEM — D64.9 ANEMIA, UNSPECIFIED: Status: ACTIVE | Noted: 2022-12-20

## 2022-12-20 NOTE — TELEPHONE ENCOUNTER
Prior Authorization Form:      DEMOGRAPHICS:                     Patient Name:  Maryse Castillo  Patient :  1960            Insurance:  Payor: Liya Rodriguez / Plan: [facility] / Product Type: *No Product type* /   Insurance ID Number:    Payer/Plan Subscr  Sex Relation Sub.  Ins. ID Effective Group Num   1. HARDEEP Chang 1960 Female Self [id number] 12/1/15 [facility] [address]         DIAGNOSIS & PROCEDURE:                       Procedure/Operation: EGD  COLONOSCOPY           CPT Code: 065113  58092    Diagnosis:  ANEMIA    ICD10 Code: D64.9    Location:  [facility]    Surgeon:  Vannessa Burnett INFORMATION:                          Date: 2023    Time: 11:00              Anesthesia:  MAC/TIVA                                                       Status:  Outpatient        Special Comments:         Electronically signed by Deni Hernandez MA on 2022 at 10:13 AM

## 2022-12-21 ENCOUNTER — TELEPHONE (OUTPATIENT)
Dept: CASE MANAGEMENT | Age: 62
End: 2022-12-21

## 2022-12-21 NOTE — TELEPHONE ENCOUNTER
I called the patient and she confirmed her CT lung screening at [facility] on 12/22/2022 at 12:30 pm.  I reminded the patient to arrive at 12:00 pm, enter through the main entrance, and register. Patient confirmed.         Electronically signed by Eulalio Samano on 12/21/22 at 3:54 PM EST

## 2022-12-22 ENCOUNTER — HOSPITAL ENCOUNTER (OUTPATIENT)
Dept: CT IMAGING | Age: 62
Discharge: HOME | End: 2022-12-24
Payer: COMMERCIAL

## 2022-12-22 DIAGNOSIS — Z87.891 PERSONAL HISTORY OF TOBACCO USE: ICD-10-CM

## 2022-12-22 PROCEDURE — 71271 CT THORAX LUNG CANCER SCR C-: CPT

## 2022-12-27 ENCOUNTER — TELEPHONE (OUTPATIENT)
Dept: CASE MANAGEMENT | Age: 62
End: 2022-12-27

## 2022-12-27 ENCOUNTER — TELEPHONE (OUTPATIENT)
Dept: SURGERY | Age: 62
End: 2022-12-27

## 2022-12-27 NOTE — TELEPHONE ENCOUNTER
No call, encounter opened to process CT Lung Screening. CT Lung Screen: 12/22/2022      Impression   1. There is no pulmonary infiltrate, mass or suspicious pulmonary nodule. 2. Emphysematous changes. LUNG RADS:   Per ACR Lung-RADS Version 1.1       Category 1, Negative (No nodules and definitely benign nodules). Management: Continue annual lung screening with LDCT in 12 months. RECOMMENDATIONS:   If you would like to register your patient with the [facility], please contact the Nurse Navigator at   [phone]. Pack years: 10.25    Social History     Tobacco Use  Smoking Status: Current Every Day Smoker    Start Date: 06/14/1974   Quit Date: 06/12/2021   Types: Cigarettes   Packs/Day: 0.25   Years: 39   Pack Years: 10.25   Smokeless Tobacco: Never         Results letter sent to patient via my chart or mailed.      [facility]

## 2023-01-27 ENCOUNTER — TELEPHONE (OUTPATIENT)
Dept: SURGERY | Age: 63
End: 2023-01-27

## 2023-01-27 NOTE — TELEPHONE ENCOUNTER
Francy Lindsay called regarding the bowel prep called into her Pharmacy sutabs - pt states she is does not want to do a two day prep - explained the other bowel preps that she could use most are two day preps except for the Miralax and Dulcolax pt does not want that bowel prep either - she is canceling both of her scopes for Monday 1-  When and if Magnesium Citrate becomes available again I will call her to reschedule.

## 2023-03-30 ENCOUNTER — OFFICE VISIT (OUTPATIENT)
Dept: PODIATRY | Age: 63
End: 2023-03-30
Payer: MEDICARE

## 2023-03-30 DIAGNOSIS — M20.42 HAMMER TOE OF LEFT FOOT: ICD-10-CM

## 2023-03-30 DIAGNOSIS — M79.672 LEFT FOOT PAIN: Primary | ICD-10-CM

## 2023-03-30 DIAGNOSIS — B35.1 TINEA UNGUIUM: ICD-10-CM

## 2023-03-30 PROCEDURE — 99213 OFFICE O/P EST LOW 20 MIN: CPT | Performed by: PODIATRIST

## 2023-03-30 PROCEDURE — 1123F ACP DISCUSS/DSCN MKR DOCD: CPT | Performed by: PODIATRIST

## 2023-03-30 NOTE — PROGRESS NOTES
Patient in office to follow up with nail fungus and left ankle pain. No complaints of pain to ankle at this time. Pavan Nassar MD last seen 11/28/2022. CC:    Follow-up nail fungus    HPI:   Follow-up nail fungus. Has been using topical Penlac with improvement. No ankle pain today. No recent injury. Pleased with progression. No additional pedal complaints. ROS:  Const: Denies constitutional symptoms  Musculo: Denies symptoms other than stated above  Skin: Denies symptoms other than stated above       Current Outpatient Medications:     ciclopirox (PENLAC) 8 % solution, Apply once daily all toenails. , Disp: 6 mL, Rfl: 2    fluticasone (FLONASE) 50 MCG/ACT nasal spray, 1 spray by Each Nostril route daily, Disp: , Rfl:     cetirizine (ZYRTEC) 10 MG tablet, Take 10 mg by mouth daily, Disp: , Rfl:     ferrous sulfate (IRON 325) 325 (65 Fe) MG tablet, Take 325 mg by mouth daily (with breakfast) Indications: last dose 1/19/23, Disp: , Rfl:     ondansetron (ZOFRAN) 4 MG tablet, Take 4 mg by mouth every 8 hours as needed for Nausea or Vomiting, Disp: , Rfl:     ketoconazole (NIZORAL) 2 % cream, Apply topically daily for 2 weeks. , Disp: 30 g, Rfl: 1    ciclopirox (PENLAC) 8 % solution, Apply once daily all toenails. , Disp: 6 mL, Rfl: 2    montelukast (SINGULAIR) 10 MG tablet, Take 1 tablet by mouth nightly, Disp: 90 tablet, Rfl: 1    lisinopril (PRINIVIL;ZESTRIL) 5 MG tablet, take 1 tablet by mouth once daily, Disp: 90 tablet, Rfl: 3    omeprazole (PRILOSEC) 20 MG delayed release capsule, take 1 capsule by mouth once daily with breakfast, Disp: 90 capsule, Rfl: 3    albuterol sulfate HFA (VENTOLIN HFA) 108 (90 Base) MCG/ACT inhaler, Inhale 2 puffs into the lungs 4 times daily as needed for Wheezing (Patient not taking: Reported on 1/26/2023), Disp: 1 Inhaler, Rfl: 1    acetaminophen (TYLENOL) 325 MG tablet, Take 650 mg by mouth every 6 hours as needed for Pain, Disp: , Rfl:     Cholecalciferol (VITAMIN D3

## 2023-03-31 DIAGNOSIS — I10 ESSENTIAL HYPERTENSION: ICD-10-CM

## 2023-03-31 RX ORDER — OMEPRAZOLE 20 MG/1
CAPSULE, DELAYED RELEASE ORAL
Qty: 90 CAPSULE | Refills: 1 | Status: SHIPPED | OUTPATIENT
Start: 2023-03-31

## 2023-03-31 RX ORDER — LISINOPRIL 5 MG/1
TABLET ORAL
Qty: 90 TABLET | Refills: 1 | Status: SHIPPED | OUTPATIENT
Start: 2023-03-31

## 2023-04-17 ENCOUNTER — TELEPHONE (OUTPATIENT)
Dept: SURGERY | Age: 63
End: 2023-04-17

## 2023-04-17 NOTE — TELEPHONE ENCOUNTER
Left a message regarding the scheduling of an egd and colonocopy - pt wants to do the mag citrate that is now available

## 2023-04-18 ENCOUNTER — TELEPHONE (OUTPATIENT)
Dept: SURGERY | Age: 63
End: 2023-04-18

## 2023-05-16 ENCOUNTER — ANESTHESIA EVENT (OUTPATIENT)
Dept: ENDOSCOPY | Age: 63
End: 2023-05-16
Payer: MEDICARE

## 2023-05-17 ENCOUNTER — HOSPITAL ENCOUNTER (OUTPATIENT)
Age: 63
Setting detail: OUTPATIENT SURGERY
Discharge: HOME | End: 2023-05-17
Attending: SURGERY | Admitting: SURGERY
Payer: MEDICARE

## 2023-05-17 ENCOUNTER — ANESTHESIA (OUTPATIENT)
Dept: ENDOSCOPY | Age: 63
End: 2023-05-17
Payer: MEDICARE

## 2023-05-17 VITALS
SYSTOLIC BLOOD PRESSURE: 125 MMHG | HEART RATE: 70 BPM | BODY MASS INDEX: 29.25 KG/M2 | RESPIRATION RATE: 18 BRPM | TEMPERATURE: 98 F | HEIGHT: 60 IN | DIASTOLIC BLOOD PRESSURE: 59 MMHG | OXYGEN SATURATION: 99 % | WEIGHT: 149 LBS

## 2023-05-17 DIAGNOSIS — D64.9 ANEMIA, UNSPECIFIED: ICD-10-CM

## 2023-05-17 PROCEDURE — 3609027000 HC COLONOSCOPY: Performed by: SURGERY

## 2023-05-17 PROCEDURE — G0121 COLON CA SCRN NOT HI RSK IND: HCPCS | Performed by: SURGERY

## 2023-05-17 PROCEDURE — 88305 TISSUE EXAM BY PATHOLOGIST: CPT

## 2023-05-17 PROCEDURE — 2580000003 HC RX 258: Performed by: NURSE ANESTHETIST, CERTIFIED REGISTERED

## 2023-05-17 PROCEDURE — 43239 EGD BIOPSY SINGLE/MULTIPLE: CPT | Performed by: SURGERY

## 2023-05-17 PROCEDURE — 3700000001 HC ADD 15 MINUTES (ANESTHESIA): Performed by: SURGERY

## 2023-05-17 PROCEDURE — 3700000000 HC ANESTHESIA ATTENDED CARE: Performed by: SURGERY

## 2023-05-17 PROCEDURE — 7100000011 HC PHASE II RECOVERY - ADDTL 15 MIN: Performed by: SURGERY

## 2023-05-17 PROCEDURE — 3609012400 HC EGD TRANSORAL BIOPSY SINGLE/MULTIPLE: Performed by: SURGERY

## 2023-05-17 PROCEDURE — 2709999900 HC NON-CHARGEABLE SUPPLY: Performed by: SURGERY

## 2023-05-17 PROCEDURE — 88342 IMHCHEM/IMCYTCHM 1ST ANTB: CPT

## 2023-05-17 PROCEDURE — 7100000010 HC PHASE II RECOVERY - FIRST 15 MIN: Performed by: SURGERY

## 2023-05-17 PROCEDURE — 6360000002 HC RX W HCPCS: Performed by: NURSE ANESTHETIST, CERTIFIED REGISTERED

## 2023-05-17 RX ORDER — SODIUM CHLORIDE 9 MG/ML
INJECTION, SOLUTION INTRAVENOUS CONTINUOUS PRN
Status: DISCONTINUED | OUTPATIENT
Start: 2023-05-17 | End: 2023-05-17 | Stop reason: SDUPTHER

## 2023-05-17 RX ORDER — PROPOFOL 10 MG/ML
INJECTION, EMULSION INTRAVENOUS PRN
Status: DISCONTINUED | OUTPATIENT
Start: 2023-05-17 | End: 2023-05-17 | Stop reason: SDUPTHER

## 2023-05-17 RX ADMIN — SODIUM CHLORIDE: 9 INJECTION, SOLUTION INTRAVENOUS at 10:48

## 2023-05-17 RX ADMIN — PROPOFOL 500 MG: 10 INJECTION, EMULSION INTRAVENOUS at 10:54

## 2023-05-17 ASSESSMENT — LIFESTYLE VARIABLES: SMOKING_STATUS: 1

## 2023-05-17 ASSESSMENT — PAIN SCALES - GENERAL: PAINLEVEL_OUTOF10: 0

## 2023-05-17 ASSESSMENT — PAIN - FUNCTIONAL ASSESSMENT: PAIN_FUNCTIONAL_ASSESSMENT: 0-10

## 2023-05-17 ASSESSMENT — PAIN DESCRIPTION - DESCRIPTORS: DESCRIPTORS: CRAMPING

## 2023-05-17 NOTE — ANESTHESIA PRE PROCEDURE
Department of Anesthesiology  Preprocedure Note       Name:  Sly Overton   Age:  70 y.o.  :  1960                                          MRN:  [id number]         Date:  2023      Surgeon: Tr Vides):  Kartik Yanes MD    Procedure: Procedure(s):  EGD ESOPHAGOGASTRODUODENOSCOPY  COLONOSCOPY DIAGNOSTIC    Medications prior to admission:   Prior to Admission medications    Medication Sig Start Date End Date Taking? Authorizing Provider   omeprazole (PRILOSEC) 20 MG delayed release capsule take 1 capsule by mouth once daily WITH BREAKFAST 3/31/23   Jayla Banegas MD   lisinopril (PRINIVIL;ZESTRIL) 5 MG tablet take 1 tablet by mouth once daily 3/31/23   Jayla Banegas MD   [facility]) 8 % solution Apply once daily all toenails. 3/30/23   Cj Henson DPM   fluticasone (FLONASE) 50 MCG/ACT nasal spray 1 spray by Each Nostril route daily    Historical Provider, MD   cetirizine (ZYRTEC) 10 MG tablet Take 1 tablet by mouth daily    Historical Provider, MD   ferrous sulfate (IRON 325) 325 (65 Fe) MG tablet Take 1 tablet by mouth daily (with breakfast) Indications: last dose 23    Historical Provider, MD   ondansetron (ZOFRAN) 4 MG tablet Take 1 tablet by mouth every 8 hours as needed for Nausea or Vomiting    Historical Provider, MD   ketoconazole (NIZORAL) 2 % cream Apply topically daily for 2 weeks. 22   Jayla Banegas MD   [facility]) 8 % solution Apply once daily all toenails.  10/20/22   Sheeba Henson DPM   montelukast (SINGULAIR) 10 MG tablet Take 1 tablet by mouth nightly  Patient not taking: Reported on 2023   CHRIS Youssef - CNP   acetaminophen (TYLENOL) 325 MG tablet Take 2 tablets by mouth every 6 hours as needed for Pain    Historical Provider, MD   Cholecalciferol (VITAMIN D3 PO) Take 2,000 Units by mouth daily    Historical Provider, MD       Current medications:    No current facility-administered

## 2023-05-17 NOTE — DISCHARGE INSTRUCTIONS
[facility] Colonoscopy PROCEDURE DISCHARGE INSTRUCTIONS  You may be drowsy or lightheaded after receiving sedation or anesthesia. A responsible person should be with you for the next 24 hours. Please follow the instructions checked below:    DIET INSTRUCTIONS:  [x]Start with light diet and progress to your normal diet as you feel like eating. If you experience nausea or repeated episodes of vomiting which persist beyond 12-24 hours, notify your doctor. []Other     ACTIVITY INSTRUCTIONS:  [x]Rest today. Increase activity as tolerated    []No heavy lifting or strenuous activity     [x]No driving for today  []Other      MEDICATION INSTRUCTIONS:    []Prescriptions sent with you. Use as directed. When taking pain medications, you may experience dizziness or drowsiness. Do not drink alcohol or drive when taking these medications. [x]Continue preop medications                               Post-procedure Care   If any tissue was removed: It will be sent to a lab to be examined. It may take 1-2 weeks for results. The doctor will usually give an initial report after the scope is removed. Other tests may be recommended. A small amount of bleeding may occur during the first few days after the procedure. When you return home after the procedure, be sure to follow your doctor's instructions, which may include:   Resume medicines as instructed by your doctor. Resume normal diet, unless directed otherwise by your doctor. The sedative will make you drowsy. Avoid driving, operating machinery, or making important decisions for the rest of the day. Rest for the remainder of the day. After arriving home, contact your doctor if any of the following occurs:   Bleeding from your rectum, notify your doctor if you pass a teaspoonful of blood or more.    Black, tarry stools   Severe abdominal pain   Hard, swollen abdomen   Signs of infection, including fever or chills   Inability to pass gas or

## 2023-05-17 NOTE — H&P
Past Surgical History:   Procedure Laterality Date    APPENDECTOMY        CARDIOVASCULAR STRESS TEST        CHOLECYSTECTOMY   1984    COLONOSCOPY   09/17/2018    COLONOSCOPY N/A 09/17/2018     COLONOSCOPY POLYPECTOMY SNARE/COLD BIOPSY performed by Maurisio Leal MD at [address] [place] [address] N/A 11/06/2020     COLONOSCOPY DIAGNOSTIC performed by Maurisio Leal MD at [facility], COLON, DIAGNOSTIC        HERNIA REPAIR   2009,2010     abdominal    HYSTERECTOMY, TOTAL ABDOMINAL (CERVIX REMOVED) N/A 2009    MASTECTOMY Right 2015     simple, blue dye, with right axillary sentinel lymph node excision    UPPER GASTROINTESTINAL ENDOSCOPY   04/03/2017    UPPER GASTROINTESTINAL ENDOSCOPY N/A 11/06/2020     EGD BIOPSY performed by Maurisio Leal MD at [facility] ENDOSCOPY            Allergies: Codeine      Medications:   Current Facility-Administered Medications          Current Outpatient Medications   Medication Sig Dispense Refill    ketoconazole (NIZORAL) 2 % cream Apply topically daily for 2 weeks. 30 g 1    ciclopirox (PENLAC) 8 % solution Apply once daily all toenails. 6 mL 2    montelukast (SINGULAIR) 10 MG tablet Take 1 tablet by mouth nightly 90 tablet 1    lisinopril (PRINIVIL;ZESTRIL) 5 MG tablet take 1 tablet by mouth once daily 90 tablet 3    omeprazole (PRILOSEC) 20 MG delayed release capsule take 1 capsule by mouth once daily with breakfast 90 capsule 3    albuterol sulfate HFA (VENTOLIN HFA) 108 (90 Base) MCG/ACT inhaler Inhale 2 puffs into the lungs 4 times daily as needed for Wheezing 1 Inhaler 1    acetaminophen (TYLENOL) 325 MG tablet Take 650 mg by mouth every 6 hours as needed for Pain        Cholecalciferol (VITAMIN D3 PO) Take 2,000 Units by mouth daily          No current facility-administered medications for this visit.              Social History:   Social History            Tobacco Use    Smoking status: Every Day       Packs/day: 0.25       Years: 41.00

## 2023-05-17 NOTE — OP NOTE
I have attempted without success to contact this patient by phone. Left VM advising pt that he can contact the office to get scheduled for his back/spine referral.    Operative Note: EGD and Colonoscopy    Daphne Petersen     DATE OF PROCEDURE: 5/17/2023  SURGEON: Dr. Rafael Greco MD, M.D. PREOPERATIVE DIAGNOSES:   Anemia  Positive FIT  History of PUD  S/P Nissen Fundoplication    POSTOPERATIVE DIAGNOSES:   GERD  Small hiatal hernia  R/O Phoenix's esophagus  Mild gastritis    Moderately severe sigmoid diverticulosis  Cecal AVM    OPERATION:    EGD esophagogastroduodenoscopy With antral, duodenal, distal esophageal and GE junction biopsies                   Colonoscopy to the cecum    SPECIMENS:  ID Type Source Tests Collected by Time Destination   A : duodenal bx Tissue Duodenum SURGICAL PATHOLOGY Anthony Hernandez MD 5/17/2023 1050    B : antral bx Tissue Stomach SURGICAL PATHOLOGY Anthony Hernandez MD 5/17/2023 1051    C : distal esophagus bx Tissue Tissue SURGICAL PATHOLOGY Anthony Hernandez MD 5/17/2023 1058    D : ge junction  bx r/o phoenix's Tissue Tissue SURGICAL PATHOLOGY Anthony Hernandez MD 5/17/2023 1059        BLOOD LOSS: Minimal    ANESTHESIA: LMAC    CONSENT AND INDICATIONS:  This is a 70y.o. year old female who is having the above. I have discussed with the patient and/or the patient representative the indication, alternatives, and the possible risks and/or complications of the planned procedure and the anesthesia methods. The patient and/or patient representative appear to understand and agree to proceed. OPERATIONS: The patient was placed on the table and sedated via LMAC. Bite block was placed. A lubricated scope was easily passed into the upper esophagus which looked normal. The distal esophagus looked abnormal: GERD and possible short segment Phoenix's esophagus. Biopsies were taken. The gastroesophageal junction was at 36 cm. The scope was passed into the stomach and retroflexed. There was a small recurrent hiatal hernia. The scope was passed down toward the pylorus.  The antral mucosa all looked abnormal: mild

## 2023-05-17 NOTE — ANESTHESIA POSTPROCEDURE EVALUATION
Department of Anesthesiology  Postprocedure Note    Patient: Rip Srivastava  MRN: [id number]  YOB: 1960  Date of evaluation: 5/17/2023      Procedure Summary     Date: 05/17/23 Room / Location: [facility] ENDO 01 / [facility]    Anesthesia Start: 9083 Anesthesia Stop: 1113    Procedures:       EGD BIOPSY      COLONOSCOPY DIAGNOSTIC Diagnosis:       Anemia, unspecified      (Anemia, unspecified [D64.9])    Surgeons: Ambrosio Tirado MD Responsible Provider: Charmaine Kowalski DO    Anesthesia Type: MAC ASA Status: 3          Anesthesia Type: No value filed.     Jake Phase I: Jake Score: 10    Jake Phase II: Jake Score: 10      Anesthesia Post Evaluation    Patient location during evaluation: PACU  Patient participation: complete - patient participated  Level of consciousness: awake and alert  Airway patency: patent  Nausea & Vomiting: no nausea and no vomiting  Complications: no  Cardiovascular status: hemodynamically stable  Respiratory status: acceptable  Hydration status: euvolemic

## 2023-05-27 SDOH — ECONOMIC STABILITY: FOOD INSECURITY: WITHIN THE PAST 12 MONTHS, YOU WORRIED THAT YOUR FOOD WOULD RUN OUT BEFORE YOU GOT MONEY TO BUY MORE.: NEVER TRUE

## 2023-05-27 SDOH — ECONOMIC STABILITY: INCOME INSECURITY: HOW HARD IS IT FOR YOU TO PAY FOR THE VERY BASICS LIKE FOOD, HOUSING, MEDICAL CARE, AND HEATING?: NOT VERY HARD

## 2023-05-27 SDOH — ECONOMIC STABILITY: FOOD INSECURITY: WITHIN THE PAST 12 MONTHS, THE FOOD YOU BOUGHT JUST DIDN'T LAST AND YOU DIDN'T HAVE MONEY TO GET MORE.: NEVER TRUE

## 2023-05-27 SDOH — ECONOMIC STABILITY: TRANSPORTATION INSECURITY
IN THE PAST 12 MONTHS, HAS LACK OF TRANSPORTATION KEPT YOU FROM MEETINGS, WORK, OR FROM GETTING THINGS NEEDED FOR DAILY LIVING?: NO

## 2023-05-27 SDOH — ECONOMIC STABILITY: HOUSING INSECURITY
IN THE LAST 12 MONTHS, WAS THERE A TIME WHEN YOU DID NOT HAVE A STEADY PLACE TO SLEEP OR SLEPT IN A SHELTER (INCLUDING NOW)?: NO

## 2023-05-30 ENCOUNTER — OFFICE VISIT (OUTPATIENT)
Dept: FAMILY MEDICINE CLINIC | Age: 63
End: 2023-05-30
Payer: MEDICARE

## 2023-05-30 VITALS
WEIGHT: 149 LBS | SYSTOLIC BLOOD PRESSURE: 128 MMHG | DIASTOLIC BLOOD PRESSURE: 82 MMHG | OXYGEN SATURATION: 96 % | HEIGHT: 60 IN | HEART RATE: 94 BPM | BODY MASS INDEX: 29.25 KG/M2 | TEMPERATURE: 98.4 F

## 2023-05-30 DIAGNOSIS — D64.9 ANEMIA, UNSPECIFIED TYPE: ICD-10-CM

## 2023-05-30 DIAGNOSIS — R06.02 SOBOE (SHORTNESS OF BREATH ON EXERTION): ICD-10-CM

## 2023-05-30 DIAGNOSIS — J44.9 CHRONIC OBSTRUCTIVE PULMONARY DISEASE, UNSPECIFIED COPD TYPE (HCC): ICD-10-CM

## 2023-05-30 DIAGNOSIS — I10 ESSENTIAL HYPERTENSION: ICD-10-CM

## 2023-05-30 DIAGNOSIS — Z85.3 HISTORY OF BREAST CANCER: ICD-10-CM

## 2023-05-30 DIAGNOSIS — I10 ESSENTIAL HYPERTENSION: Primary | ICD-10-CM

## 2023-05-30 DIAGNOSIS — Z91.09 ENVIRONMENTAL ALLERGIES: ICD-10-CM

## 2023-05-30 DIAGNOSIS — K55.20 AVM (ARTERIOVENOUS MALFORMATION) OF COLON: ICD-10-CM

## 2023-05-30 LAB
ALBUMIN SERPL-MCNC: 4.2 G/DL (ref 3.5–5.2)
ALP SERPL-CCNC: 96 U/L (ref 35–104)
ALT SERPL-CCNC: 12 U/L (ref 0–32)
ANION GAP SERPL CALCULATED.3IONS-SCNC: 16 MMOL/L (ref 7–16)
ANISOCYTOSIS: ABNORMAL
AST SERPL-CCNC: 16 U/L (ref 0–31)
BASOPHILS # BLD: 0.08 E9/L (ref 0–0.2)
BASOPHILS NFR BLD: 0.7 % (ref 0–2)
BILIRUB SERPL-MCNC: <0.2 MG/DL (ref 0–1.2)
BUN SERPL-MCNC: 14 MG/DL (ref 6–23)
BURR CELLS: ABNORMAL
CALCIUM SERPL-MCNC: 9.7 MG/DL (ref 8.6–10.2)
CHLORIDE SERPL-SCNC: 102 MMOL/L (ref 98–107)
CO2 SERPL-SCNC: 20 MMOL/L (ref 22–29)
CREAT SERPL-MCNC: 1 MG/DL (ref 0.5–1)
EOSINOPHIL # BLD: 0.14 E9/L (ref 0.05–0.5)
EOSINOPHIL NFR BLD: 1.3 % (ref 0–6)
ERYTHROCYTE [DISTWIDTH] IN BLOOD BY AUTOMATED COUNT: 21 FL (ref 11.5–15)
FERRITIN SERPL-MCNC: 10 NG/ML
FOLATE SERPL-MCNC: 9.5 NG/ML (ref 4.8–24.2)
GLUCOSE SERPL-MCNC: 85 MG/DL (ref 74–99)
HCT VFR BLD AUTO: 28.5 % (ref 34–48)
HGB BLD-MCNC: 7.2 G/DL (ref 11.5–15.5)
HYPOCHROMIA: ABNORMAL
IMM GRANULOCYTES # BLD: 0.07 E9/L
IMM GRANULOCYTES NFR BLD: 0.6 % (ref 0–5)
IRON SATN MFR SERPL: 4 % (ref 15–50)
IRON SERPL-MCNC: 18 MCG/DL (ref 37–145)
LYMPHOCYTES # BLD: 1.72 E9/L (ref 1.5–4)
LYMPHOCYTES NFR BLD: 15.4 % (ref 20–42)
MCH RBC QN AUTO: 16.7 PG (ref 26–35)
MCHC RBC AUTO-ENTMCNC: 25.3 % (ref 32–34.5)
MCV RBC AUTO: 66.1 FL (ref 80–99.9)
MONOCYTES # BLD: 0.74 E9/L (ref 0.1–0.95)
MONOCYTES NFR BLD: 6.6 % (ref 2–12)
NEUTROPHILS # BLD: 8.39 E9/L (ref 1.8–7.3)
NEUTS SEG NFR BLD: 75.4 % (ref 43–80)
OVALOCYTES: ABNORMAL
PLATELET # BLD AUTO: 542 E9/L (ref 130–450)
PMV BLD AUTO: 9 FL (ref 7–12)
POIKILOCYTES: ABNORMAL
POLYCHROMASIA: ABNORMAL
POTASSIUM SERPL-SCNC: 4.6 MMOL/L (ref 3.5–5)
PROT SERPL-MCNC: 7.7 G/DL (ref 6.4–8.3)
RBC # BLD AUTO: 4.31 E12/L (ref 3.5–5.5)
SODIUM SERPL-SCNC: 138 MMOL/L (ref 132–146)
TEAR DROP CELLS: ABNORMAL
TIBC SERPL-MCNC: 494 MCG/DL (ref 250–450)
VIT B12 SERPL-MCNC: 348 PG/ML (ref 211–946)
WBC # BLD: 11.1 E9/L (ref 4.5–11.5)

## 2023-05-30 PROCEDURE — 3074F SYST BP LT 130 MM HG: CPT | Performed by: FAMILY MEDICINE

## 2023-05-30 PROCEDURE — 3023F SPIROM DOC REV: CPT | Performed by: FAMILY MEDICINE

## 2023-05-30 PROCEDURE — 1123F ACP DISCUSS/DSCN MKR DOCD: CPT | Performed by: FAMILY MEDICINE

## 2023-05-30 PROCEDURE — 3017F COLORECTAL CA SCREEN DOC REV: CPT | Performed by: FAMILY MEDICINE

## 2023-05-30 PROCEDURE — G8417 CALC BMI ABV UP PARAM F/U: HCPCS | Performed by: FAMILY MEDICINE

## 2023-05-30 PROCEDURE — G8427 DOCREV CUR MEDS BY ELIG CLIN: HCPCS | Performed by: FAMILY MEDICINE

## 2023-05-30 PROCEDURE — 4004F PT TOBACCO SCREEN RCVD TLK: CPT | Performed by: FAMILY MEDICINE

## 2023-05-30 PROCEDURE — 99214 OFFICE O/P EST MOD 30 MIN: CPT | Performed by: FAMILY MEDICINE

## 2023-05-30 PROCEDURE — 1090F PRES/ABSN URINE INCON ASSESS: CPT | Performed by: FAMILY MEDICINE

## 2023-05-30 PROCEDURE — G8399 PT W/DXA RESULTS DOCUMENT: HCPCS | Performed by: FAMILY MEDICINE

## 2023-05-30 PROCEDURE — 3079F DIAST BP 80-89 MM HG: CPT | Performed by: FAMILY MEDICINE

## 2023-05-30 RX ORDER — FLUTICASONE PROPIONATE AND SALMETEROL 250; 50 UG/1; UG/1
1 POWDER RESPIRATORY (INHALATION) EVERY 12 HOURS
Qty: 60 EACH | Refills: 2 | Status: SHIPPED | OUTPATIENT
Start: 2023-05-30

## 2023-05-30 ASSESSMENT — PATIENT HEALTH QUESTIONNAIRE - PHQ9
7. TROUBLE CONCENTRATING ON THINGS, SUCH AS READING THE NEWSPAPER OR WATCHING TELEVISION: 0
SUM OF ALL RESPONSES TO PHQ QUESTIONS 1-9: 6
10. IF YOU CHECKED OFF ANY PROBLEMS, HOW DIFFICULT HAVE THESE PROBLEMS MADE IT FOR YOU TO DO YOUR WORK, TAKE CARE OF THINGS AT HOME, OR GET ALONG WITH OTHER PEOPLE: 1
9. THOUGHTS THAT YOU WOULD BE BETTER OFF DEAD, OR OF HURTING YOURSELF: 0
SUM OF ALL RESPONSES TO PHQ QUESTIONS 1-9: 6
4. FEELING TIRED OR HAVING LITTLE ENERGY: 3
2. FEELING DOWN, DEPRESSED OR HOPELESS: 0
SUM OF ALL RESPONSES TO PHQ QUESTIONS 1-9: 6
8. MOVING OR SPEAKING SO SLOWLY THAT OTHER PEOPLE COULD HAVE NOTICED. OR THE OPPOSITE, BEING SO FIGETY OR RESTLESS THAT YOU HAVE BEEN MOVING AROUND A LOT MORE THAN USUAL: 0
3. TROUBLE FALLING OR STAYING ASLEEP: 0
5. POOR APPETITE OR OVEREATING: 3
SUM OF ALL RESPONSES TO PHQ QUESTIONS 1-9: 6
6. FEELING BAD ABOUT YOURSELF - OR THAT YOU ARE A FAILURE OR HAVE LET YOURSELF OR YOUR FAMILY DOWN: 0
1. LITTLE INTEREST OR PLEASURE IN DOING THINGS: 0
SUM OF ALL RESPONSES TO PHQ9 QUESTIONS 1 & 2: 0

## 2023-05-30 NOTE — PROGRESS NOTES
[facility]  Office Progress Note - Dr. Marj Peterson  5/30/23    CC:   Chief Complaint   Patient presents with    Shortness of Breath     SOBOE/easily tires out - onset 2 months. Allergies     Would like to discuss allergy meds that have not been working. /82   Pulse 94   Temp 98.4 °F (36.9 °C) (Temporal)   Ht 5' (1.524 m)   Wt 149 lb (67.6 kg)   SpO2 96%   BMI 29.10 kg/m²   Wt Readings from Last 3 Encounters:   05/30/23 149 lb (67.6 kg)   05/17/23 149 lb (67.6 kg)   12/19/22 140 lb (63.5 kg)       HPI: 6 mos FU  Recently had upper and lower scopes for anemia noted last winter and positive FIT. Cecal AVM and diverticulosis. She's tired regularly and doesn't have energy. She also has mild depression and chooses not to treat this with medication. She needs labs rechecked for the anemia with further exploration (r/o iron def, B12 def, etc.)      Operative Note: EGD and Colonoscopy   Shannan Tafoya Trinity    DATE OF PROCEDURE: 5/17/2023  SURGEON: Dr. Magalis Silva MD, M.D. PREOPERATIVE DIAGNOSES:   Anemia  Positive FIT  History of PUD  S/P Nissen Fundoplication   POSTOPERATIVE DIAGNOSES:   GERD  Small hiatal hernia  R/O Hinds's esophagus  Mild gastritis   ______  Moderately severe sigmoid diverticulosis  Cecal AVM      Mood has remained with depressive symptoms regularly. Allergy symptoms very bothersome recently  Singulair, zyrtec, flonase not working great. Wonders about alternatives. Has been noting SOBOE. Particularly with exertion. Shes uses a gorcery cart when sopping to lean on. No CP or tightness noted. Has cut back smoking to about 6 cigs daily. Slight cough occasionally, usually clear phlegm. She had emphysema changes on Lung Ct in Dec 2022. She is having to take abreak when doing typical daily activities. PFTs a couple years ago showed Moderate obstructive findings. IMPRESSION: (august 2021)  1.   Moderate obstructive lung

## 2023-05-31 DIAGNOSIS — Z12.31 VISIT FOR SCREENING MAMMOGRAM: Primary | ICD-10-CM

## 2023-06-29 ENCOUNTER — OFFICE VISIT (OUTPATIENT)
Dept: SURGERY | Age: 63
End: 2023-06-29
Payer: MEDICARE

## 2023-06-29 VITALS
HEIGHT: 60 IN | RESPIRATION RATE: 16 BRPM | HEART RATE: 89 BPM | DIASTOLIC BLOOD PRESSURE: 72 MMHG | BODY MASS INDEX: 28.86 KG/M2 | TEMPERATURE: 97.9 F | OXYGEN SATURATION: 96 % | WEIGHT: 147 LBS | SYSTOLIC BLOOD PRESSURE: 125 MMHG

## 2023-06-29 DIAGNOSIS — K21.00 GASTROESOPHAGEAL REFLUX DISEASE WITH ESOPHAGITIS WITHOUT HEMORRHAGE: ICD-10-CM

## 2023-06-29 DIAGNOSIS — K58.0 IRRITABLE BOWEL SYNDROME WITH DIARRHEA: Primary | ICD-10-CM

## 2023-06-29 DIAGNOSIS — D50.0 IRON DEFICIENCY ANEMIA DUE TO CHRONIC BLOOD LOSS: ICD-10-CM

## 2023-06-29 DIAGNOSIS — K55.20 AVM (ARTERIOVENOUS MALFORMATION) OF COLON: ICD-10-CM

## 2023-06-29 PROCEDURE — G8427 DOCREV CUR MEDS BY ELIG CLIN: HCPCS | Performed by: SURGERY

## 2023-06-29 PROCEDURE — 1123F ACP DISCUSS/DSCN MKR DOCD: CPT | Performed by: SURGERY

## 2023-06-29 PROCEDURE — G8399 PT W/DXA RESULTS DOCUMENT: HCPCS | Performed by: SURGERY

## 2023-06-29 PROCEDURE — 3074F SYST BP LT 130 MM HG: CPT | Performed by: SURGERY

## 2023-06-29 PROCEDURE — G8417 CALC BMI ABV UP PARAM F/U: HCPCS | Performed by: SURGERY

## 2023-06-29 PROCEDURE — 3078F DIAST BP <80 MM HG: CPT | Performed by: SURGERY

## 2023-06-29 PROCEDURE — 99215 OFFICE O/P EST HI 40 MIN: CPT | Performed by: SURGERY

## 2023-06-29 PROCEDURE — 3017F COLORECTAL CA SCREEN DOC REV: CPT | Performed by: SURGERY

## 2023-06-29 PROCEDURE — 1090F PRES/ABSN URINE INCON ASSESS: CPT | Performed by: SURGERY

## 2023-06-29 PROCEDURE — 4004F PT TOBACCO SCREEN RCVD TLK: CPT | Performed by: SURGERY

## 2023-06-29 RX ORDER — DICYCLOMINE HCL 20 MG
20 TABLET ORAL 4 TIMES DAILY
Qty: 120 TABLET | Refills: 3 | Status: SHIPPED | OUTPATIENT
Start: 2023-06-29 | End: 2023-10-27

## 2023-07-12 DIAGNOSIS — Z12.31 SCREENING MAMMOGRAM, ENCOUNTER FOR: Primary | ICD-10-CM

## 2023-07-12 DIAGNOSIS — Z12.31 VISIT FOR SCREENING MAMMOGRAM: ICD-10-CM

## 2023-07-24 DIAGNOSIS — M81.0 POSTMENOPAUSAL BONE LOSS: Primary | ICD-10-CM

## 2023-07-24 ASSESSMENT — ENCOUNTER SYMPTOMS
COLOR CHANGE: 0
VOICE CHANGE: 0
CHEST TIGHTNESS: 0
ABDOMINAL DISTENTION: 0
VOMITING: 0
COUGH: 0
BACK PAIN: 0
ABDOMINAL PAIN: 0
BLOOD IN STOOL: 0
CHOKING: 0
SORE THROAT: 0
RHINORRHEA: 0
CONSTIPATION: 0
WHEEZING: 0
TROUBLE SWALLOWING: 0
SHORTNESS OF BREATH: 0
DIARRHEA: 0
SINUS PAIN: 0
NAUSEA: 0

## 2023-07-24 NOTE — PROGRESS NOTES
Summary:  Stage I ER/NM positive, HER-2/luis negative ID carcinoma of the right breast.  Oncotype DX recurrence score of 5. This note was copied forward from the last encounter. Essential components for this patient record were reviewed and verified on this visit including:  recent hospitalizations, recent imaging, PMH, PSH, FH, SOC HX, Allergies, and Medications were reviewed and updated as appropriate. In addition, the assessment and plan were copied from prior office note and updated accordingly. Patient ID: Gloria Hernandez is a 70 y.o. female. \[facility]\""    Sami Currie is here for a follow up visit of her Stage I ER/NM positive, HER-2/luis negative carcinoma of the right breast.    She underwent a right simple mastectomy, right axillary sentinel lymph node excision, right axillary dissection on March 2, 2016. Pathological evaluation demonstrated:  A. 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 Tumor Size: Size of Largest Invasive Carcinoma 2.0 cm. Margins of excision are negative for invasive carcinoma. Skin and nipple, negative for invasive carcinoma  B. Right sentinel lymph nodes #1: Two lymph nodes, negative for neoplasm;  C. [place] lymph node #2: Single lymph node, negative for neoplasm;  D. Upper inner chest wall mass: Invasive ductal carcinoma involving fibrovascular and neurovascular tissue, negative for lymph node. Pathologic Staging:  Primary tumor- pT 1c  Regional lymph nodes-(sn)pN 0(i-)  Distant metastases- pM x  Additional Pathologic Findings-invasive ductal carcinoma involving soft tissue of upper/inner quadrant chest    Additional note: The invasive carcinoma present in the specimen designated as upper/inner quadrant is discontinuous from the centrally located invasive carcinoma and does not involve lymphoid tissue. Tumor involves the inked margins of excision in this specimen. Intradepartmental consultation is obtained.  Case discussed with Dr. Bo Corley

## 2023-07-27 ENCOUNTER — PROCEDURE VISIT (OUTPATIENT)
Dept: PODIATRY | Age: 63
End: 2023-07-27
Payer: MEDICARE

## 2023-07-27 DIAGNOSIS — M79.672 LEFT FOOT PAIN: Primary | ICD-10-CM

## 2023-07-27 DIAGNOSIS — M21.619 BUNION: ICD-10-CM

## 2023-07-27 DIAGNOSIS — B35.1 TINEA UNGUIUM: ICD-10-CM

## 2023-07-27 DIAGNOSIS — M20.42 HAMMER TOE OF LEFT FOOT: ICD-10-CM

## 2023-07-27 PROCEDURE — 4004F PT TOBACCO SCREEN RCVD TLK: CPT | Performed by: PODIATRIST

## 2023-07-27 PROCEDURE — 1090F PRES/ABSN URINE INCON ASSESS: CPT | Performed by: PODIATRIST

## 2023-07-27 PROCEDURE — 99213 OFFICE O/P EST LOW 20 MIN: CPT | Performed by: PODIATRIST

## 2023-07-27 PROCEDURE — 1123F ACP DISCUSS/DSCN MKR DOCD: CPT | Performed by: PODIATRIST

## 2023-07-27 PROCEDURE — G8417 CALC BMI ABV UP PARAM F/U: HCPCS | Performed by: PODIATRIST

## 2023-07-27 PROCEDURE — G8399 PT W/DXA RESULTS DOCUMENT: HCPCS | Performed by: PODIATRIST

## 2023-07-27 PROCEDURE — 3017F COLORECTAL CA SCREEN DOC REV: CPT | Performed by: PODIATRIST

## 2023-07-27 PROCEDURE — G8427 DOCREV CUR MEDS BY ELIG CLIN: HCPCS | Performed by: PODIATRIST

## 2023-07-27 NOTE — PROGRESS NOTES
Patient in office to follow up with left ankle pain and nail fungus. Continues to have occasional pain. CC:    Follow-up nail fungus    HPI:   Follow-up nail fungus. Does have topical Penlac she has been using. Denies any significant foot or ankle pain today. No redness or any open wounds. No ingrown toenails. No additional pedal complaints today. ROS:  Const: Denies constitutional symptoms  Musculo: Denies symptoms other than stated above  Skin: Denies symptoms other than stated above       Current Outpatient Medications:     triamcinolone (KENALOG) 0.1 % cream, Apply topically 2 times daily Apply topically 2 times daily. , Disp: , Rfl:     dicyclomine (BENTYL) 20 MG tablet, Take 1 tablet by mouth 4 times daily, Disp: 120 tablet, Rfl: 3    fluticasone-salmeterol (ADVAIR DISKUS) 250-50 MCG/ACT AEPB diskus inhaler, Inhale 1 puff into the lungs in the morning and 1 puff in the evening. Rinse mouth after use. ., Disp: 60 each, Rfl: 2    omeprazole (PRILOSEC) 20 MG delayed release capsule, take 1 capsule by mouth once daily WITH BREAKFAST, Disp: 90 capsule, Rfl: 1    lisinopril (PRINIVIL;ZESTRIL) 5 MG tablet, take 1 tablet by mouth once daily, Disp: 90 tablet, Rfl: 1    ferrous sulfate (IRON 325) 325 (65 Fe) MG tablet, Take 1 tablet by mouth daily (with breakfast) Indications: last dose 1/19/23 (Patient not taking: Reported on 7/31/2023), Disp: , Rfl:     ondansetron (ZOFRAN) 4 MG tablet, Take 1 tablet by mouth every 8 hours as needed for Nausea or Vomiting, Disp: , Rfl:     ciclopirox (PENLAC) 8 % solution, Apply once daily all toenails. , Disp: 6 mL, Rfl: 2    acetaminophen (TYLENOL) 325 MG tablet, Take 2 tablets by mouth every 6 hours as needed for Pain, Disp: , Rfl:     Cholecalciferol (VITAMIN D3 PO), Take 2,000 Units by mouth daily, Disp: , Rfl:   Allergies   Allergen Reactions    Codeine Nausea And Vomiting       Past Medical History:   Diagnosis Date    Anemia     follows with [facility]

## 2023-07-31 ENCOUNTER — OFFICE VISIT (OUTPATIENT)
Dept: BREAST CENTER | Age: 63
End: 2023-07-31

## 2023-07-31 VITALS
WEIGHT: 150 LBS | OXYGEN SATURATION: 98 % | TEMPERATURE: 98.1 F | SYSTOLIC BLOOD PRESSURE: 117 MMHG | BODY MASS INDEX: 29.45 KG/M2 | RESPIRATION RATE: 18 BRPM | HEIGHT: 60 IN | DIASTOLIC BLOOD PRESSURE: 70 MMHG | HEART RATE: 85 BPM

## 2023-07-31 DIAGNOSIS — Z85.3 PERSONAL HISTORY OF BREAST CANCER: Primary | ICD-10-CM

## 2023-07-31 RX ORDER — TRIAMCINOLONE ACETONIDE 1 MG/G
CREAM TOPICAL 2 TIMES DAILY
COMMUNITY

## 2023-07-31 ASSESSMENT — ENCOUNTER SYMPTOMS: SINUS PRESSURE: 0

## 2023-08-03 ENCOUNTER — OFFICE VISIT (OUTPATIENT)
Dept: SURGERY | Age: 63
End: 2023-08-03
Payer: MEDICARE

## 2023-08-03 VITALS
SYSTOLIC BLOOD PRESSURE: 120 MMHG | OXYGEN SATURATION: 95 % | DIASTOLIC BLOOD PRESSURE: 73 MMHG | WEIGHT: 145 LBS | BODY MASS INDEX: 28.47 KG/M2 | HEART RATE: 85 BPM | HEIGHT: 60 IN | TEMPERATURE: 97 F

## 2023-08-03 DIAGNOSIS — K55.20 AVM (ARTERIOVENOUS MALFORMATION) OF COLON: ICD-10-CM

## 2023-08-03 DIAGNOSIS — K58.0 IRRITABLE BOWEL SYNDROME WITH DIARRHEA: Primary | ICD-10-CM

## 2023-08-03 DIAGNOSIS — K21.00 GASTROESOPHAGEAL REFLUX DISEASE WITH ESOPHAGITIS WITHOUT HEMORRHAGE: ICD-10-CM

## 2023-08-03 PROCEDURE — G8399 PT W/DXA RESULTS DOCUMENT: HCPCS | Performed by: SURGERY

## 2023-08-03 PROCEDURE — 1123F ACP DISCUSS/DSCN MKR DOCD: CPT | Performed by: SURGERY

## 2023-08-03 PROCEDURE — 99213 OFFICE O/P EST LOW 20 MIN: CPT | Performed by: SURGERY

## 2023-08-03 PROCEDURE — 3017F COLORECTAL CA SCREEN DOC REV: CPT | Performed by: SURGERY

## 2023-08-03 PROCEDURE — G8417 CALC BMI ABV UP PARAM F/U: HCPCS | Performed by: SURGERY

## 2023-08-03 PROCEDURE — 3074F SYST BP LT 130 MM HG: CPT | Performed by: SURGERY

## 2023-08-03 PROCEDURE — G8427 DOCREV CUR MEDS BY ELIG CLIN: HCPCS | Performed by: SURGERY

## 2023-08-03 PROCEDURE — 3078F DIAST BP <80 MM HG: CPT | Performed by: SURGERY

## 2023-08-03 PROCEDURE — 4004F PT TOBACCO SCREEN RCVD TLK: CPT | Performed by: SURGERY

## 2023-08-03 PROCEDURE — 1090F PRES/ABSN URINE INCON ASSESS: CPT | Performed by: SURGERY

## 2023-08-03 NOTE — PROGRESS NOTES
deficiency  Positive FIT  History of PUD  S/P Nissen Fundoplication  GERD  Small hiatal hernia  Mild gastritis     Moderately severe sigmoid diverticulosis  Pathology discussed with the patient including possible complications (bleeding, diverticulitis, recurrent disease, possible need for antibiotics or surgery, perforation). Recommended diet discussed - high fiber 20-25 grams a day. Fiber supplement. Increase water intake. Cecal AVM, small - no overt signs of bleeding, possibly could have bleeding off and on contributing to her anemia, though I would expect more obvious bleeding with the degree of her iron deficiency anemia. Discussed another colonoscopy with cauterization but will see how she responds to iron infusions. Could also be related to possible small bowel AVMs. IBS  Bentyl BID    PPI daily  Acid reflux precautions and lifestyle modifications discussed - weight loss, elevate head of bed 30 degrees, stop eating 3 hours before bedtime, avoid trigger foods.   Limit NSAIDs, caffeine, alcohol  GERD diet    Repeat colonoscopy in 5 years - paternal grandfather with colon cancer    Electronically by Blade Muñoz MD, General Surgery  on 8/3/2023 at 10:43 AM      Send copy of H&P to PCP, Gwendolyn Mendes MD and referring physician, Baltazar Curtis,*      8/3/2023

## 2023-08-17 ENCOUNTER — TELEPHONE (OUTPATIENT)
Dept: FAMILY MEDICINE CLINIC | Age: 63
End: 2023-08-17

## 2023-08-17 NOTE — TELEPHONE ENCOUNTER
----- Message from Abdoul Delgadillo sent at 8/17/2023  8:52 AM EDT -----  Subject: Message to Provider    QUESTIONS  Information for Provider? Patient is in the process of changing her   insurance plan from [facility] to [facility]. Patient called the 800 number in   [employer] message for CMS to set up new insurance but they want to know   which specific plans are accepted through [facility]) for [facility]. Patient   was looking into [facility] Dual Complete, but she wants to make sure she can   keep Dr. Rc Alves as her PCP and all her other doctors as well. Please   advise.   ---------------------------------------------------------------------------  --------------  Omaira Jacobson [facility]  [phone]; OK to leave message on voicemail,OK to respond with electronic   message via [employer] portal (only for patients who have registered [employer]   account)  ---------------------------------------------------------------------------  --------------  SCRIPT ANSWERS  Relationship to Patient?  Self

## 2023-08-17 NOTE — TELEPHONE ENCOUNTER
LVM for Marva People to let her know that we do accept [facility] Dual Complete, and that all of our providers accept the same insurances. Did provide our phone number for her to call back if she has any further questions.

## 2023-08-18 ENCOUNTER — TELEPHONE (OUTPATIENT)
Dept: FAMILY MEDICINE CLINIC | Age: 63
End: 2023-08-18

## 2023-08-18 NOTE — TELEPHONE ENCOUNTER
----- Message from Tarun Diez sent at 8/17/2023  8:52 AM EDT -----  Subject: Message to Provider    QUESTIONS  Information for Provider? Patient is in the process of changing her   insurance plan from [facility] to [facility]. Patient called the 800 number in   [employer] message for CMS to set up new insurance but they want to know   which specific plans are accepted through [facility]) for [facility]. Patient   was looking into [facility] Dual Complete, but she wants to make sure she can   keep Dr. Madyson Felix as her PCP and all her other doctors as well. Please   advise.   ---------------------------------------------------------------------------  --------------  Ynes Welsh LGJZ  [phone]; OK to leave message on voicemail,OK to respond with electronic   message via [employer] portal (only for patients who have registered [employer]   account)  ---------------------------------------------------------------------------  --------------  SCRIPT ANSWERS  Relationship to Patient?  Self

## 2023-09-06 DIAGNOSIS — I10 ESSENTIAL HYPERTENSION: ICD-10-CM

## 2023-09-06 RX ORDER — OMEPRAZOLE 20 MG/1
20 CAPSULE, DELAYED RELEASE ORAL DAILY
Qty: 90 CAPSULE | Refills: 1 | Status: SHIPPED | OUTPATIENT
Start: 2023-09-06

## 2023-09-06 RX ORDER — LISINOPRIL 5 MG/1
5 TABLET ORAL DAILY
Qty: 90 TABLET | Refills: 1 | Status: SHIPPED | OUTPATIENT
Start: 2023-09-06

## 2023-09-06 NOTE — TELEPHONE ENCOUNTER
Last Appointment:  5/30/2023  Future Appointments   Date Time Provider [address]   1/9/2024 11:00 AM Staci Dias DPM Col Podiatry [facility]   8/5/2024 10:00 AM AYANNA OSPINA IMAGING RANDI COL [facility]   8/5/2024 10:30 AM Antoni Kam, CHRIS - CNP COL [facility]

## 2023-10-10 RX ORDER — DICYCLOMINE HCL 20 MG
20 TABLET ORAL 4 TIMES DAILY
Qty: 120 TABLET | Refills: 3 | OUTPATIENT
Start: 2023-10-10

## 2023-10-10 NOTE — TELEPHONE ENCOUNTER
Spoke with pt and she has enough for now since she is only taking bid. She will call when she needs a refill.

## 2024-01-08 ENCOUNTER — HOSPITAL ENCOUNTER (OUTPATIENT)
Dept: CT IMAGING | Age: 64
Discharge: HOME | End: 2024-01-10
Attending: INTERNAL MEDICINE
Payer: MEDICARE

## 2024-01-08 DIAGNOSIS — D50.9 IRON DEFICIENCY ANEMIA, UNSPECIFIED IRON DEFICIENCY ANEMIA TYPE: ICD-10-CM

## 2024-01-08 DIAGNOSIS — C50.211 MALIGNANT NEOPLASM OF UPPER-INNER QUADRANT OF RIGHT FEMALE BREAST, UNSPECIFIED ESTROGEN RECEPTOR STATUS (HCC): ICD-10-CM

## 2024-01-08 DIAGNOSIS — F17.210 CIGARETTE SMOKER: ICD-10-CM

## 2024-01-08 DIAGNOSIS — C50.011 MALIGNANT NEOPLASM OF NIPPLE AND AREOLA OF FEMALE BREAST, RIGHT (HCC): ICD-10-CM

## 2024-01-08 PROCEDURE — 71271 CT THORAX LUNG CANCER SCR C-: CPT

## 2024-01-09 SDOH — HEALTH STABILITY: PHYSICAL HEALTH: ON AVERAGE, HOW MANY DAYS PER WEEK DO YOU ENGAGE IN MODERATE TO STRENUOUS EXERCISE (LIKE A BRISK WALK)?: 0 DAYS

## 2024-01-09 SDOH — HEALTH STABILITY: PHYSICAL HEALTH: ON AVERAGE, HOW MANY MINUTES DO YOU ENGAGE IN EXERCISE AT THIS LEVEL?: 0 MIN

## 2024-01-09 ASSESSMENT — LIFESTYLE VARIABLES
HOW OFTEN DO YOU HAVE A DRINK CONTAINING ALCOHOL: NEVER
HOW OFTEN DO YOU HAVE SIX OR MORE DRINKS ON ONE OCCASION: 1
HOW OFTEN DO YOU HAVE A DRINK CONTAINING ALCOHOL: 1
HOW MANY STANDARD DRINKS CONTAINING ALCOHOL DO YOU HAVE ON A TYPICAL DAY: PATIENT DOES NOT DRINK
HOW MANY STANDARD DRINKS CONTAINING ALCOHOL DO YOU HAVE ON A TYPICAL DAY: 0

## 2024-01-09 ASSESSMENT — PATIENT HEALTH QUESTIONNAIRE - PHQ9
3. TROUBLE FALLING OR STAYING ASLEEP: 3
1. LITTLE INTEREST OR PLEASURE IN DOING THINGS: 3
SUM OF ALL RESPONSES TO PHQ QUESTIONS 1-9: 15
5. POOR APPETITE OR OVEREATING: 3
SUM OF ALL RESPONSES TO PHQ QUESTIONS 1-9: 15
9. THOUGHTS THAT YOU WOULD BE BETTER OFF DEAD, OR OF HURTING YOURSELF: 0
SUM OF ALL RESPONSES TO PHQ9 QUESTIONS 1 & 2: 6
8. MOVING OR SPEAKING SO SLOWLY THAT OTHER PEOPLE COULD HAVE NOTICED. OR THE OPPOSITE, BEING SO FIGETY OR RESTLESS THAT YOU HAVE BEEN MOVING AROUND A LOT MORE THAN USUAL: 0
10. IF YOU CHECKED OFF ANY PROBLEMS, HOW DIFFICULT HAVE THESE PROBLEMS MADE IT FOR YOU TO DO YOUR WORK, TAKE CARE OF THINGS AT HOME, OR GET ALONG WITH OTHER PEOPLE: 0
SUM OF ALL RESPONSES TO PHQ QUESTIONS 1-9: 15
4. FEELING TIRED OR HAVING LITTLE ENERGY: 3
SUM OF ALL RESPONSES TO PHQ QUESTIONS 1-9: 15
2. FEELING DOWN, DEPRESSED OR HOPELESS: 3
6. FEELING BAD ABOUT YOURSELF - OR THAT YOU ARE A FAILURE OR HAVE LET YOURSELF OR YOUR FAMILY DOWN: 0
7. TROUBLE CONCENTRATING ON THINGS, SUCH AS READING THE NEWSPAPER OR WATCHING TELEVISION: 0

## 2024-01-11 ENCOUNTER — TELEPHONE (OUTPATIENT)
Dept: CASE MANAGEMENT | Age: 64
End: 2024-01-11

## 2024-01-11 NOTE — TELEPHONE ENCOUNTER
No call, encounter opened to process CT Lung Screening.    CT Lung Screen: 1/8/2024    IMPRESSION:  1. There is no pulmonary infiltrate, mass or suspicious pulmonary nodule  2. Emphysematous changes     LUNG RADS:  Lung-RADS 1 - Negative (v2022)     Management:  12 month screening LDCT     RECOMMENDATIONS:  If you would like to register your patient with the [facility] Lung Nodule/Lung  Cancer Screening Program, please contact the Nurse Navigator at  [phone].    Pack years: 11.7    Social History     Tobacco Use  Smoking Status: Current Every Day Smoker    Start Date: 1974   Quit Date: 2021   Types: Cigarettes   Packs/Day: 0.25   Years: 47   Pack Years: 11.7   Smokeless Tobacco: Never         Results letter sent to patient via my chart or mailed.     Jose Martin Metzger  Imaging Navigator   [facility]   [phone]

## 2024-01-12 ENCOUNTER — OFFICE VISIT (OUTPATIENT)
Dept: FAMILY MEDICINE CLINIC | Age: 64
End: 2024-01-12
Payer: MEDICARE

## 2024-01-12 VITALS
OXYGEN SATURATION: 96 % | TEMPERATURE: 98.1 F | HEIGHT: 60 IN | HEART RATE: 85 BPM | SYSTOLIC BLOOD PRESSURE: 128 MMHG | DIASTOLIC BLOOD PRESSURE: 86 MMHG | BODY MASS INDEX: 28.27 KG/M2 | WEIGHT: 144 LBS

## 2024-01-12 DIAGNOSIS — F33.0 MILD EPISODE OF RECURRENT MAJOR DEPRESSIVE DISORDER (HCC): ICD-10-CM

## 2024-01-12 DIAGNOSIS — F17.210 NICOTINE DEPENDENCE, CIGARETTES, UNCOMPLICATED: ICD-10-CM

## 2024-01-12 DIAGNOSIS — Z23 NEED FOR INFLUENZA VACCINATION: ICD-10-CM

## 2024-01-12 DIAGNOSIS — Z00.00 WELCOME TO MEDICARE PREVENTIVE VISIT: Primary | ICD-10-CM

## 2024-01-12 DIAGNOSIS — J44.9 CHRONIC OBSTRUCTIVE PULMONARY DISEASE, UNSPECIFIED COPD TYPE (HCC): ICD-10-CM

## 2024-01-12 PROCEDURE — 3079F DIAST BP 80-89 MM HG: CPT | Performed by: FAMILY MEDICINE

## 2024-01-12 PROCEDURE — 3017F COLORECTAL CA SCREEN DOC REV: CPT | Performed by: FAMILY MEDICINE

## 2024-01-12 PROCEDURE — 3074F SYST BP LT 130 MM HG: CPT | Performed by: FAMILY MEDICINE

## 2024-01-12 PROCEDURE — 1123F ACP DISCUSS/DSCN MKR DOCD: CPT | Performed by: FAMILY MEDICINE

## 2024-01-12 PROCEDURE — G0008 ADMIN INFLUENZA VIRUS VAC: HCPCS | Performed by: FAMILY MEDICINE

## 2024-01-12 PROCEDURE — 90677 PCV20 VACCINE IM: CPT | Performed by: FAMILY MEDICINE

## 2024-01-12 PROCEDURE — 90694 VACC AIIV4 NO PRSRV 0.5ML IM: CPT | Performed by: FAMILY MEDICINE

## 2024-01-12 PROCEDURE — G0402 INITIAL PREVENTIVE EXAM: HCPCS | Performed by: FAMILY MEDICINE

## 2024-01-12 PROCEDURE — G0009 ADMIN PNEUMOCOCCAL VACCINE: HCPCS | Performed by: FAMILY MEDICINE

## 2024-01-12 RX ORDER — BUPROPION HYDROCHLORIDE 150 MG/1
150 TABLET ORAL EVERY MORNING
Qty: 30 TABLET | Refills: 2 | Status: SHIPPED | OUTPATIENT
Start: 2024-01-12

## 2024-01-12 RX ORDER — TIOTROPIUM BROMIDE 18 UG/1
18 CAPSULE ORAL; RESPIRATORY (INHALATION) DAILY
Qty: 30 CAPSULE | Refills: 0 | Status: SHIPPED | OUTPATIENT
Start: 2024-01-12

## 2024-01-12 RX ORDER — NICOTINE 21 MG/24HR
1 PATCH, TRANSDERMAL 24 HOURS TRANSDERMAL DAILY
Qty: 30 PATCH | Refills: 1 | Status: SHIPPED | OUTPATIENT
Start: 2024-01-12

## 2024-01-12 NOTE — PATIENT INSTRUCTIONS
tests  These tests are used:  To see if you need glasses or contact lenses.  To monitor an eye problem.  To check an eye injury.  Visual acuity tests are done as part of routine exams. You may also have this test when you get your 's license or apply for some types of jobs.  Visual field tests  These tests are used:  To check for vision loss in any area of your range of vision.  To screen for certain eye diseases.  To look for nerve damage after a stroke, head injury, or other problem that could reduce blood flow to the brain.  Refraction and color tests  A refraction test is done to find the right prescription for glasses and contact lenses.  A color vision test is done to check for color blindness.  Color vision is often tested as part of a routine exam. You may also have this test when you apply for a job where recognizing different colors is important, such as , electronics, or the .  How are vision tests done?  Visual acuity test   You cover one eye at a time.  You read aloud from a wall chart across the room.  You read aloud from a small card that you hold in your hand.  Refraction   You look into a special device.  The device puts lenses of different strengths in front of each eye to see how strong your glasses or contact lenses need to be.  Visual field tests   Your doctor may have you look through special machines.  Or your doctor may simply have you stare straight ahead while they move a finger into and out of your field of vision.  Color vision test   You look at pieces of printed test patterns in various colors. You say what number or symbol you see.  Your doctor may have you trace the number or symbol using a pointer.  How do these tests feel?  There is very little chance of having a problem from this test. If dilating drops are used for a vision test, they may make the eyes sting and cause a medicine taste in the mouth.  Follow-up care is a key part of your treatment and

## 2024-01-12 NOTE — PROGRESS NOTES
Medicare Annual Wellness Visit    Tuyet Petersen is here for Medicare AWV and Ear Fullness (BL ear fullness, sinus drainage and sore throat. Onset since [place].)    Assessment & Plan   Welcome to Medicare preventive visit  Overall Tuyet Petersen is doing well.   Age appropriate HM topics reviewed and updated where agreeable.   Vaccines reviewed and updated where agreeable.   Age appropriate anticipatory guidance discussed.   Encouraged to work on healthy diet and exercise strategies.   Opportunity to ask questions and answers provided as able.     Updated preventive topics as agreeable.  She received her influenza and pneumonia vaccines today.  She is up-to-date on breast cancer screening with history of breast cancer.  She is up-to-date on colon cancer screening.      Recommend RTO in 1 year for annual physical.       Need for influenza vaccination  -     Influenza, FLUAD, (age 65 y+), IM, Preservative Free, 0.5 mL  Chronic obstructive pulmonary disease, unspecified COPD type (HCC)  -     tiotropium (SPIRIVA HANDIHALER) 18 MCG inhalation capsule; Inhale 1 capsule into the lungs daily, Disp-30 capsule, R-0Normal  Switch Advair to Spiriva at her preference.  She had an episode of thrush and it sounds like.  Mild episode of recurrent major depressive disorder (HCC)  -   Start buPROPion (WELLBUTRIN XL) 150 MG extended release tablet; Take 1 tablet by mouth every morning, Disp-30 tablet, R-2Normal  Nicotine dependence, cigarettes, uncomplicated  -Start   buPROPion (WELLBUTRIN XL) 150 MG extended release tablet; Take 1 tablet by mouth every morning, Disp-30 tablet, R-2Normal  -  start   nicotine (NICODERM CQ) 14 MG/24HR; Place 1 patch onto the skin daily, Disp-30 patch, R-1Normal    She was counseled on smoking cessation.  She is contemplative but really not ready to commit.  She would like to try medication.  We used Wellbutrin in the past and again she really was not ready to stop smoking.  She did not have

## 2024-01-31 DIAGNOSIS — J44.9 CHRONIC OBSTRUCTIVE PULMONARY DISEASE, UNSPECIFIED COPD TYPE (HCC): ICD-10-CM

## 2024-01-31 RX ORDER — TIOTROPIUM BROMIDE 18 UG/1
18 CAPSULE ORAL; RESPIRATORY (INHALATION) DAILY
Qty: 30 CAPSULE | Refills: 5 | Status: SHIPPED | OUTPATIENT
Start: 2024-01-31

## 2024-02-23 RX ORDER — OMEPRAZOLE 20 MG/1
20 CAPSULE, DELAYED RELEASE ORAL DAILY
Qty: 90 CAPSULE | Refills: 1 | Status: SHIPPED | OUTPATIENT
Start: 2024-02-23

## 2024-02-23 NOTE — TELEPHONE ENCOUNTER
Last Appointment:  1/12/2024  Future Appointments   Date Time Provider Department Center   7/12/2024 10:40 AM Elvis Ren MD [facility]   8/5/2024 10:00 AM TESFAYEYX ANAI IMAGING RANDI COL [facility]   8/5/2024 10:30 AM Michelle Kam, APRN - CNP [facility]

## 2024-02-24 DIAGNOSIS — I10 ESSENTIAL HYPERTENSION: ICD-10-CM

## 2024-02-26 RX ORDER — LISINOPRIL 5 MG/1
5 TABLET ORAL DAILY
Qty: 90 TABLET | Refills: 1 | Status: SHIPPED | OUTPATIENT
Start: 2024-02-26

## 2024-02-26 NOTE — TELEPHONE ENCOUNTER
Last Appointment:  1/12/2024  Future Appointments   Date Time Provider Department Center   7/12/2024 10:40 AM Elvis Ren MD [facility]   8/5/2024 10:00 AM TESFAYEYX ANAI IMAGING RANDI COL [facility]   8/5/2024 10:30 AM Michelle Kam, APRN - CNP [facility]        
English

## 2024-05-28 DIAGNOSIS — Z12.31 VISIT FOR SCREENING MAMMOGRAM: Primary | ICD-10-CM

## 2024-06-12 ENCOUNTER — TELEPHONE (OUTPATIENT)
Dept: SURGERY | Age: 64
End: 2024-06-12

## 2024-06-12 RX ORDER — DICYCLOMINE HYDROCHLORIDE 10 MG/1
10 CAPSULE ORAL
Qty: 360 CAPSULE | Refills: 0 | Status: CANCELLED | OUTPATIENT
Start: 2024-06-12 | End: 2024-09-10

## 2024-07-09 SDOH — ECONOMIC STABILITY: FOOD INSECURITY: WITHIN THE PAST 12 MONTHS, YOU WORRIED THAT YOUR FOOD WOULD RUN OUT BEFORE YOU GOT MONEY TO BUY MORE.: NEVER TRUE

## 2024-07-09 SDOH — ECONOMIC STABILITY: FOOD INSECURITY: WITHIN THE PAST 12 MONTHS, THE FOOD YOU BOUGHT JUST DIDN'T LAST AND YOU DIDN'T HAVE MONEY TO GET MORE.: NEVER TRUE

## 2024-07-09 SDOH — ECONOMIC STABILITY: INCOME INSECURITY: HOW HARD IS IT FOR YOU TO PAY FOR THE VERY BASICS LIKE FOOD, HOUSING, MEDICAL CARE, AND HEATING?: NOT VERY HARD

## 2024-07-12 ENCOUNTER — OFFICE VISIT (OUTPATIENT)
Dept: FAMILY MEDICINE CLINIC | Age: 64
End: 2024-07-12
Payer: MEDICARE

## 2024-07-12 VITALS
TEMPERATURE: 98.2 F | WEIGHT: 145 LBS | SYSTOLIC BLOOD PRESSURE: 130 MMHG | HEART RATE: 89 BPM | OXYGEN SATURATION: 94 % | HEIGHT: 60 IN | BODY MASS INDEX: 28.47 KG/M2 | DIASTOLIC BLOOD PRESSURE: 82 MMHG

## 2024-07-12 DIAGNOSIS — J01.90 ACUTE NON-RECURRENT SINUSITIS, UNSPECIFIED LOCATION: ICD-10-CM

## 2024-07-12 DIAGNOSIS — K55.20 AVM (ARTERIOVENOUS MALFORMATION) OF COLON: ICD-10-CM

## 2024-07-12 DIAGNOSIS — I10 ESSENTIAL HYPERTENSION: ICD-10-CM

## 2024-07-12 DIAGNOSIS — D50.9 IRON DEFICIENCY ANEMIA, UNSPECIFIED IRON DEFICIENCY ANEMIA TYPE: Primary | ICD-10-CM

## 2024-07-12 PROCEDURE — 1090F PRES/ABSN URINE INCON ASSESS: CPT | Performed by: FAMILY MEDICINE

## 2024-07-12 PROCEDURE — 1123F ACP DISCUSS/DSCN MKR DOCD: CPT | Performed by: FAMILY MEDICINE

## 2024-07-12 PROCEDURE — 3079F DIAST BP 80-89 MM HG: CPT | Performed by: FAMILY MEDICINE

## 2024-07-12 PROCEDURE — 3075F SYST BP GE 130 - 139MM HG: CPT | Performed by: FAMILY MEDICINE

## 2024-07-12 PROCEDURE — G8399 PT W/DXA RESULTS DOCUMENT: HCPCS | Performed by: FAMILY MEDICINE

## 2024-07-12 PROCEDURE — G8427 DOCREV CUR MEDS BY ELIG CLIN: HCPCS | Performed by: FAMILY MEDICINE

## 2024-07-12 PROCEDURE — 99214 OFFICE O/P EST MOD 30 MIN: CPT | Performed by: FAMILY MEDICINE

## 2024-07-12 PROCEDURE — 4004F PT TOBACCO SCREEN RCVD TLK: CPT | Performed by: FAMILY MEDICINE

## 2024-07-12 PROCEDURE — G8417 CALC BMI ABV UP PARAM F/U: HCPCS | Performed by: FAMILY MEDICINE

## 2024-07-12 PROCEDURE — 3017F COLORECTAL CA SCREEN DOC REV: CPT | Performed by: FAMILY MEDICINE

## 2024-07-12 RX ORDER — MONTELUKAST SODIUM 10 MG/1
10 TABLET ORAL NIGHTLY
Qty: 30 TABLET | Refills: 0 | Status: SHIPPED | OUTPATIENT
Start: 2024-07-12

## 2024-07-12 RX ORDER — OMEPRAZOLE 20 MG/1
20 CAPSULE, DELAYED RELEASE ORAL DAILY
Qty: 90 CAPSULE | Refills: 1 | Status: SHIPPED | OUTPATIENT
Start: 2024-07-12

## 2024-07-12 RX ORDER — LISINOPRIL 5 MG/1
5 TABLET ORAL DAILY
Qty: 90 TABLET | Refills: 1 | Status: SHIPPED | OUTPATIENT
Start: 2024-07-12

## 2024-07-12 NOTE — PROGRESS NOTES
[facility]  Office Progress Note - Dr. Ren  7/12/24    CC:   Chief Complaint   Patient presents with    Hypertension    Discuss Medications     Not seeing improvement with Spiriva inhaler.        /82   Pulse 89   Temp 98.2 °F (36.8 °C) (Temporal)   Ht 1.524 m (5')   Wt 65.8 kg (145 lb)   SpO2 94%   BMI 28.32 kg/m²   Wt Readings from Last 3 Encounters:   07/12/24 65.8 kg (145 lb)   01/12/24 65.3 kg (144 lb)   08/03/23 65.8 kg (145 lb)       HPI: Hypertension  Follow-up  Blood pressure is  controlled today.  BP Readings from Last 3 Encounters:   07/12/24 130/82   01/12/24 128/86   08/03/23 120/73     Patient continues medications regularly. Compliance is good.  Denies CP, sob, abd pain, headaches, vision changes, dizziness, hypotensive symptoms.   No side effects from medications noted.       July 1st blood work.  [facility]    Patient was diagnosed with iron deficiency anemia again based on lab work from around [facility] Day which showed a microcytic anemia with elevated TIBC and low iron levels.  Patient did not have any occult stool evaluation at that time.  Reviewing her last colonoscopy she did have a cecal AVM noted, which was not bleeding at that time.  She is getting iron infusions guided by her oncologist.        _________________________________________________________    Assessment / Plan  Tuyet was seen today for hypertension and discuss medications.    Diagnoses and all orders for this visit:    Iron deficiency anemia, unspecified iron deficiency anemia type  -     POCT Fecal Immunochemical Test (FIT); Future    AVM (arteriovenous malformation) of colon  -     POCT Fecal Immunochemical Test (FIT); Future    While I think the most likely explanation is a true iron deficiency, for completeness she needs evaluation for occult blood in the stool, especially in light of the known cecal AVM which could be leading to the development of the iron deficiency.  She does have

## 2024-07-15 DIAGNOSIS — K55.20 AVM (ARTERIOVENOUS MALFORMATION) OF COLON: ICD-10-CM

## 2024-07-15 DIAGNOSIS — D50.9 IRON DEFICIENCY ANEMIA, UNSPECIFIED IRON DEFICIENCY ANEMIA TYPE: ICD-10-CM

## 2024-07-15 LAB
CONTROL: NORMAL
FECAL BLOOD IMMUNOCHEMICAL TEST: POSITIVE

## 2024-07-15 PROCEDURE — 82274 ASSAY TEST FOR BLOOD FECAL: CPT | Performed by: FAMILY MEDICINE

## 2024-07-16 NOTE — RESULT ENCOUNTER NOTE
Labs reviewed from the [facility].  Her anemia resolved nicely after the iron infusion.  Hemoglobin level is 12.  There was still blood in her stool, so still deserves discussion with her general surgeon, but the problem is at least temporarily improved.

## 2024-08-02 NOTE — PROGRESS NOTES
Summary:  Stage I ER/DE positive, HER-2/luis negative ID carcinoma of the right breast.  Oncotype DX recurrence score of 5.    This note was copied forward from the last encounter.  Essential components for this patient record were reviewed and verified on this visit including:  recent hospitalizations, recent imaging, PMH, PSH, FH, SOC HX, Allergies, and Medications were reviewed and updated as appropriate.  In addition, the assessment and plan were copied from prior office note and updated accordingly.     Patient ID: Tuyet Petersen is a 64 y.o. female.    KARLA Benz is here for a follow up visit of her Stage I ER/DE positive, HER-2/luis negative carcinoma of the right breast.    She underwent a right simple mastectomy, right axillary sentinel lymph node excision, right axillary dissection on March 2, 2016. Pathological evaluation demonstrated:  A. 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 Tumor Size: Size of Largest Invasive Carcinoma 2.0 cm. Margins of excision are negative for invasive carcinoma. Skin and nipple, negative for invasive carcinoma  B. Right sentinel lymph nodes #1: Two lymph nodes, negative for neoplasm;  C. [place] lymph node #2: Single lymph node, negative for neoplasm;  D. Upper inner chest wall mass: Invasive ductal carcinoma involving fibrovascular and neurovascular tissue, negative for lymph node.    Pathologic Staging:  Primary tumor- pT 1c  Regional lymph nodes-(sn)pN 0(i-)  Distant metastases- pM x  Additional Pathologic Findings-invasive ductal carcinoma involving soft tissue of upper/inner quadrant chest    Additional note: The invasive carcinoma present in the specimen designated as upper/inner quadrant is discontinuous from the centrally located invasive carcinoma and does not involve lymphoid tissue. Tumor involves the inked margins of excision in this specimen. Intradepartmental consultation is obtained. Case discussed with Dr. Ba

## 2024-08-05 ENCOUNTER — OFFICE VISIT (OUTPATIENT)
Dept: BREAST CENTER | Age: 64
End: 2024-08-05
Payer: MEDICARE

## 2024-08-05 VITALS
SYSTOLIC BLOOD PRESSURE: 132 MMHG | HEART RATE: 85 BPM | DIASTOLIC BLOOD PRESSURE: 70 MMHG | OXYGEN SATURATION: 98 % | HEIGHT: 60 IN | BODY MASS INDEX: 28.27 KG/M2 | WEIGHT: 144 LBS | TEMPERATURE: 98 F | RESPIRATION RATE: 16 BRPM

## 2024-08-05 DIAGNOSIS — E01.0 THYROMEGALY: ICD-10-CM

## 2024-08-05 DIAGNOSIS — E01.0 THYROMEGALY: Primary | ICD-10-CM

## 2024-08-05 DIAGNOSIS — J44.9 CHRONIC OBSTRUCTIVE PULMONARY DISEASE, UNSPECIFIED COPD TYPE (HCC): ICD-10-CM

## 2024-08-05 LAB
ALBUMIN: 4.7 G/DL (ref 3.5–5.2)
ALP BLD-CCNC: 95 U/L (ref 35–104)
ALT SERPL-CCNC: 12 U/L (ref 0–32)
ANION GAP SERPL CALCULATED.3IONS-SCNC: 16 MMOL/L (ref 7–16)
AST SERPL-CCNC: 22 U/L (ref 0–31)
BASOPHILS ABSOLUTE: 0.05 K/UL (ref 0–0.2)
BASOPHILS RELATIVE PERCENT: 1 % (ref 0–2)
BILIRUB SERPL-MCNC: 0.2 MG/DL (ref 0–1.2)
BUN BLDV-MCNC: 11 MG/DL (ref 6–23)
CALCIUM SERPL-MCNC: 9.7 MG/DL (ref 8.6–10.2)
CHLORIDE BLD-SCNC: 103 MMOL/L (ref 98–107)
CO2: 21 MMOL/L (ref 22–29)
CREAT SERPL-MCNC: 1 MG/DL (ref 0.5–1)
EOSINOPHILS ABSOLUTE: 0.1 K/UL (ref 0.05–0.5)
EOSINOPHILS RELATIVE PERCENT: 1 % (ref 0–6)
GFR, ESTIMATED: 61 ML/MIN/1.73M2
GLUCOSE BLD-MCNC: 81 MG/DL (ref 74–99)
HCT VFR BLD CALC: 47.6 % (ref 34–48)
HEMOGLOBIN: 14.4 G/DL (ref 11.5–15.5)
IMMATURE GRANULOCYTES %: 0 % (ref 0–5)
IMMATURE GRANULOCYTES ABSOLUTE: 0.03 K/UL (ref 0–0.58)
LYMPHOCYTES ABSOLUTE: 1.44 K/UL (ref 1.5–4)
LYMPHOCYTES RELATIVE PERCENT: 16 % (ref 20–42)
MCH RBC QN AUTO: 26.4 PG (ref 26–35)
MCHC RBC AUTO-ENTMCNC: 30.3 G/DL (ref 32–34.5)
MCV RBC AUTO: 87.3 FL (ref 80–99.9)
MONOCYTES ABSOLUTE: 0.66 K/UL (ref 0.1–0.95)
MONOCYTES RELATIVE PERCENT: 7 % (ref 2–12)
NEUTROPHILS ABSOLUTE: 6.7 K/UL (ref 1.8–7.3)
NEUTROPHILS RELATIVE PERCENT: 75 % (ref 43–80)
PDW BLD-RTO: ABNORMAL % (ref 11.5–15)
PLATELET # BLD: 354 K/UL (ref 130–450)
PMV BLD AUTO: 9.4 FL (ref 7–12)
POTASSIUM SERPL-SCNC: 4.6 MMOL/L (ref 3.5–5)
RBC # BLD: 5.45 M/UL (ref 3.5–5.5)
RBC # BLD: ABNORMAL 10*6/UL
SODIUM BLD-SCNC: 140 MMOL/L (ref 132–146)
T3 TOTAL: 122 NG/DL (ref 80–200)
T4 FREE: 1.3 NG/DL (ref 0.9–1.7)
TOTAL PROTEIN: 8 G/DL (ref 6.4–8.3)
TSH SERPL DL<=0.05 MIU/L-ACNC: 0.98 UIU/ML (ref 0.27–4.2)
WBC # BLD: 9 K/UL (ref 4.5–11.5)

## 2024-08-05 PROCEDURE — 99213 OFFICE O/P EST LOW 20 MIN: CPT | Performed by: NURSE PRACTITIONER

## 2024-08-05 PROCEDURE — 3017F COLORECTAL CA SCREEN DOC REV: CPT | Performed by: NURSE PRACTITIONER

## 2024-08-05 PROCEDURE — G8417 CALC BMI ABV UP PARAM F/U: HCPCS | Performed by: NURSE PRACTITIONER

## 2024-08-05 PROCEDURE — 1090F PRES/ABSN URINE INCON ASSESS: CPT | Performed by: NURSE PRACTITIONER

## 2024-08-05 PROCEDURE — 4004F PT TOBACCO SCREEN RCVD TLK: CPT | Performed by: NURSE PRACTITIONER

## 2024-08-05 PROCEDURE — G8427 DOCREV CUR MEDS BY ELIG CLIN: HCPCS | Performed by: NURSE PRACTITIONER

## 2024-08-05 PROCEDURE — 3078F DIAST BP <80 MM HG: CPT | Performed by: NURSE PRACTITIONER

## 2024-08-05 PROCEDURE — G8399 PT W/DXA RESULTS DOCUMENT: HCPCS | Performed by: NURSE PRACTITIONER

## 2024-08-05 PROCEDURE — 3075F SYST BP GE 130 - 139MM HG: CPT | Performed by: NURSE PRACTITIONER

## 2024-08-05 PROCEDURE — 1123F ACP DISCUSS/DSCN MKR DOCD: CPT | Performed by: NURSE PRACTITIONER

## 2024-08-05 RX ORDER — TIOTROPIUM BROMIDE 18 UG/1
18 CAPSULE ORAL; RESPIRATORY (INHALATION) DAILY
Qty: 30 CAPSULE | Refills: 5 | Status: SHIPPED | OUTPATIENT
Start: 2024-08-05

## 2024-08-05 NOTE — TELEPHONE ENCOUNTER
Last Appointment:  7/12/2024  Future Appointments   Date Time Provider Department Center   8/5/2024  2:00 PM MHYX [facility] IM[facility]   8/12/2024  1:30 PM Gabriela Henry MD [facility] GEN SURG [facility]   1/13/2025  9:20 AM Elvis Ren MD [facility]   8/11/2025  9:00 AM MHYX [facility] IMAGING [facility] COL [facility]   8/11/2025  9:30 AM Michelle Kam, APRN - CNP COL [facility]

## 2024-08-12 ENCOUNTER — OFFICE VISIT (OUTPATIENT)
Dept: SURGERY | Age: 64
End: 2024-08-12
Payer: MEDICARE

## 2024-08-12 VITALS
HEART RATE: 91 BPM | OXYGEN SATURATION: 99 % | BODY MASS INDEX: 28.19 KG/M2 | TEMPERATURE: 97.3 F | HEIGHT: 60 IN | RESPIRATION RATE: 18 BRPM | SYSTOLIC BLOOD PRESSURE: 111 MMHG | DIASTOLIC BLOOD PRESSURE: 71 MMHG | WEIGHT: 143.6 LBS

## 2024-08-12 DIAGNOSIS — D50.0 IRON DEFICIENCY ANEMIA DUE TO CHRONIC BLOOD LOSS: Primary | ICD-10-CM

## 2024-08-12 DIAGNOSIS — K55.20 AVM (ARTERIOVENOUS MALFORMATION) OF COLON: ICD-10-CM

## 2024-08-12 DIAGNOSIS — K58.0 IRRITABLE BOWEL SYNDROME WITH DIARRHEA: ICD-10-CM

## 2024-08-12 PROCEDURE — 4004F PT TOBACCO SCREEN RCVD TLK: CPT | Performed by: SURGERY

## 2024-08-12 PROCEDURE — 1123F ACP DISCUSS/DSCN MKR DOCD: CPT | Performed by: SURGERY

## 2024-08-12 PROCEDURE — 3078F DIAST BP <80 MM HG: CPT | Performed by: SURGERY

## 2024-08-12 PROCEDURE — G8417 CALC BMI ABV UP PARAM F/U: HCPCS | Performed by: SURGERY

## 2024-08-12 PROCEDURE — 1090F PRES/ABSN URINE INCON ASSESS: CPT | Performed by: SURGERY

## 2024-08-12 PROCEDURE — G8427 DOCREV CUR MEDS BY ELIG CLIN: HCPCS | Performed by: SURGERY

## 2024-08-12 PROCEDURE — 99214 OFFICE O/P EST MOD 30 MIN: CPT | Performed by: SURGERY

## 2024-08-12 PROCEDURE — G8399 PT W/DXA RESULTS DOCUMENT: HCPCS | Performed by: SURGERY

## 2024-08-12 PROCEDURE — 3074F SYST BP LT 130 MM HG: CPT | Performed by: SURGERY

## 2024-08-12 PROCEDURE — 3017F COLORECTAL CA SCREEN DOC REV: CPT | Performed by: SURGERY

## 2024-08-12 RX ORDER — ONDANSETRON 4 MG/1
4 TABLET, FILM COATED ORAL 3 TIMES DAILY PRN
Qty: 15 TABLET | Refills: 0 | Status: SHIPPED | OUTPATIENT
Start: 2024-08-12

## 2024-08-12 RX ORDER — DICYCLOMINE HCL 20 MG
20 TABLET ORAL 4 TIMES DAILY
Qty: 360 TABLET | Refills: 3 | Status: SHIPPED | OUTPATIENT
Start: 2024-08-12 | End: 2024-11-10

## 2024-08-12 NOTE — PROGRESS NOTES
Progress Note - Follow up    Patient's Name/Date of Birth: Tuyet Petersen / 1960    Date: 8/12/2024    PCP: Elvis Ren MD    Referring Physician:   Elvis Ren,*  [phone]    Chief Complaint   Patient presents with    Medication Refill     Bentyl     Follow-up     Doing okay, iron is still running low. Meds are still helping with diarrhea, bloating and \"gassy\"        HPI:    The patient said then bentyl has been helpful. She is taking it twice a day. She said she has issues with bloating. She notices she has diarrhea on Saturdays when she goes to see her daughter. She is wondering if anxiety makes her symptoms worse.    She said she has also gotten iron infusions twice this year for anemia. She said Dr. Ren is wondering if the cecal AVM is contributing. The patient is very reluctant to have a colonoscopy because the prep makes her nauseous.    Patient's medications, allergies, past medical, surgical, social and family histories were reviewed and updated as appropriate.    Review of Systems  Constitutional: negative  Respiratory: negative  Cardiovascular: negative  Gastrointestinal: as in hpi  Genitourinary:negative  Integument/breast: negative    Physical Exam:  /71 (Site: Right Upper Arm, Position: Sitting, Cuff Size: Medium Adult)   Pulse 91   Temp 97.3 °F (36.3 °C) (Temporal)   Resp 18   Ht 1.524 m (5')   Wt 65.1 kg (143 lb 9.6 oz)   SpO2 99%   BMI 28.04 kg/m²   General appearance: alert, cooperative and in no acute distress.  Lungs: normal work of breathing  Heart: regular rate  Abdomen: soft, nontender, nondistended  Musculoskeletal: symmetrical without edema.  Skin: normal       Impression/Plan:  64 y.o. year old female with:    All available labs and pathology reviewed.  All imaging independently reviewed and interpreted.   All provider notes reviewed.  The above was discussed with the patient at length.    History of PUD  S/P Nissen

## 2024-08-14 NOTE — TELEPHONE ENCOUNTER
I spoke with pt. She will call the hospital to schedule.    Prior Authorization Form:      DEMOGRAPHICS:                     Patient Name:  Mckenzie Cortes  Patient :  1960            Insurance:  Payor: Lillia Frankel / Plan: Shoshana Byrne / Product Type: *No Product type* /   Insurance ID Number:    Payer/Plan Subscr  Sex Relation Sub. Ins. ID Effective Group Num   1.  BCBS MEDICARELuamanda Osborne 1960 Female [facility] [id number] [facility]                                    [id number]         DIAGNOSIS & PROCEDURE:                       Procedure/Operation: EGD COLONOSCOPY           CPT Code: 47284  42542    Diagnosis:  ANEMIA    ICD10 Code: D64.9    Location:  [facility]    Surgeon:  Alli Gonzalez INFORMATION:                          Date: 2023    Time: 11:30              Anesthesia:  MAC/TIVA                                                       Status:  Outpatient        Special Comments:         Electronically signed by William John MA on 2023 at 8:30 AM

## 2024-11-22 DIAGNOSIS — I10 ESSENTIAL HYPERTENSION: ICD-10-CM

## 2024-11-22 RX ORDER — LISINOPRIL 5 MG/1
5 TABLET ORAL DAILY
Qty: 90 TABLET | Refills: 1 | Status: SHIPPED | OUTPATIENT
Start: 2024-11-22

## 2024-11-22 NOTE — TELEPHONE ENCOUNTER
Last Appointment:  7/12/2024  Future Appointments   Date Time Provider Department Center   1/13/2025  9:20 AM Elvis Ren MD [facility]   8/11/2025  9:00 AM AYANNA OSPINA IMAGING [facility]   8/11/2025  9:30 AM Michelle Kam, APRN - CNP [facility]         Excisional Biopsy Additional Text (Leave Blank If You Do Not Want): The margin was drawn around the clinically apparent lesion. An elliptical shape was then drawn on the skin incorporating the lesion and margins.  Incisions were then made along these lines to the appropriate tissue plane and the lesion was extirpated.

## 2024-12-31 ENCOUNTER — APPOINTMENT (OUTPATIENT)
Dept: CT IMAGING | Age: 64
DRG: 190 | End: 2024-12-31
Payer: MEDICARE

## 2024-12-31 ENCOUNTER — TRANSCRIBE ORDERS (OUTPATIENT)
Dept: ADMINISTRATIVE | Age: 64
End: 2024-12-31

## 2024-12-31 ENCOUNTER — HOSPITAL ENCOUNTER (INPATIENT)
Age: 64
LOS: 7 days | Discharge: HOME | DRG: 190 | End: 2025-01-07
Attending: EMERGENCY MEDICINE | Admitting: FAMILY MEDICINE
Payer: MEDICARE

## 2024-12-31 ENCOUNTER — APPOINTMENT (OUTPATIENT)
Dept: GENERAL RADIOLOGY | Age: 64
DRG: 190 | End: 2024-12-31
Payer: MEDICARE

## 2024-12-31 DIAGNOSIS — J96.01 ACUTE HYPOXIC RESPIRATORY FAILURE: ICD-10-CM

## 2024-12-31 DIAGNOSIS — T45.4X5A ADVERSE EFFECT OF IRON AND ITS COMPOUNDS, INITIAL ENCOUNTER: Primary | ICD-10-CM

## 2024-12-31 DIAGNOSIS — J44.1 COPD EXACERBATION (HCC): Primary | ICD-10-CM

## 2024-12-31 DIAGNOSIS — K92.1 MELENA: ICD-10-CM

## 2024-12-31 DIAGNOSIS — B33.8 RESPIRATORY SYNCYTIAL VIRUS (RSV): ICD-10-CM

## 2024-12-31 LAB
ALBUMIN SERPL-MCNC: 4.1 G/DL (ref 3.5–5.2)
ALP SERPL-CCNC: 104 U/L (ref 35–104)
ALT SERPL-CCNC: 18 U/L (ref 0–32)
ANION GAP SERPL CALCULATED.3IONS-SCNC: 11 MMOL/L (ref 7–16)
AST SERPL-CCNC: 19 U/L (ref 0–31)
B PARAP IS1001 DNA NPH QL NAA+NON-PROBE: NOT DETECTED
B PERT DNA SPEC QL NAA+PROBE: NOT DETECTED
BASOPHILS # BLD: 0.03 K/UL (ref 0–0.2)
BASOPHILS NFR BLD: 1 % (ref 0–2)
BILIRUB SERPL-MCNC: <0.2 MG/DL (ref 0–1.2)
BNP SERPL-MCNC: 91 PG/ML (ref 0–125)
BUN SERPL-MCNC: 13 MG/DL (ref 6–23)
C PNEUM DNA NPH QL NAA+NON-PROBE: NOT DETECTED
CALCIUM SERPL-MCNC: 9.4 MG/DL (ref 8.6–10.2)
CHLORIDE SERPL-SCNC: 101 MMOL/L (ref 98–107)
CO2 SERPL-SCNC: 24 MMOL/L (ref 22–29)
CREAT SERPL-MCNC: 0.9 MG/DL (ref 0.5–1)
EKG ATRIAL RATE: 88 BPM
EKG P AXIS: 68 DEGREES
EKG P-R INTERVAL: 148 MS
EKG Q-T INTERVAL: 372 MS
EKG QRS DURATION: 82 MS
EKG QTC CALCULATION (BAZETT): 450 MS
EKG R AXIS: 2 DEGREES
EKG T AXIS: 77 DEGREES
EKG VENTRICULAR RATE: 88 BPM
EOSINOPHIL # BLD: 0.04 K/UL (ref 0.05–0.5)
EOSINOPHILS RELATIVE PERCENT: 1 % (ref 0–6)
ERYTHROCYTE [DISTWIDTH] IN BLOOD BY AUTOMATED COUNT: 19.8 % (ref 11.5–15)
FLUAV RNA NPH QL NAA+NON-PROBE: NOT DETECTED
FLUBV RNA NPH QL NAA+NON-PROBE: NOT DETECTED
GFR, ESTIMATED: 68 ML/MIN/1.73M2
GLUCOSE SERPL-MCNC: 112 MG/DL (ref 74–99)
HADV DNA NPH QL NAA+NON-PROBE: NOT DETECTED
HCOV 229E RNA NPH QL NAA+NON-PROBE: NOT DETECTED
HCOV HKU1 RNA NPH QL NAA+NON-PROBE: NOT DETECTED
HCOV NL63 RNA NPH QL NAA+NON-PROBE: NOT DETECTED
HCOV OC43 RNA NPH QL NAA+NON-PROBE: NOT DETECTED
HCT VFR BLD AUTO: 40.1 % (ref 34–48)
HGB BLD-MCNC: 12.8 G/DL (ref 11.5–15.5)
HMPV RNA NPH QL NAA+NON-PROBE: NOT DETECTED
HPIV1 RNA NPH QL NAA+NON-PROBE: NOT DETECTED
HPIV2 RNA NPH QL NAA+NON-PROBE: NOT DETECTED
HPIV3 RNA NPH QL NAA+NON-PROBE: NOT DETECTED
HPIV4 RNA NPH QL NAA+NON-PROBE: NOT DETECTED
IMM GRANULOCYTES # BLD AUTO: 0.03 K/UL (ref 0–0.58)
IMM GRANULOCYTES NFR BLD: 1 % (ref 0–5)
LYMPHOCYTES NFR BLD: 0.59 K/UL (ref 1.5–4)
LYMPHOCYTES RELATIVE PERCENT: 9 % (ref 20–42)
M PNEUMO DNA NPH QL NAA+NON-PROBE: NOT DETECTED
MAGNESIUM SERPL-MCNC: 2 MG/DL (ref 1.6–2.6)
MCH RBC QN AUTO: 26.9 PG (ref 26–35)
MCHC RBC AUTO-ENTMCNC: 31.9 G/DL (ref 32–34.5)
MCV RBC AUTO: 84.2 FL (ref 80–99.9)
MONOCYTES NFR BLD: 0.65 K/UL (ref 0.1–0.95)
MONOCYTES NFR BLD: 10 % (ref 2–12)
NEUTROPHILS NFR BLD: 79 % (ref 43–80)
NEUTS SEG NFR BLD: 4.97 K/UL (ref 1.8–7.3)
PLATELET # BLD AUTO: 263 K/UL (ref 130–450)
PMV BLD AUTO: 9.2 FL (ref 7–12)
POTASSIUM SERPL-SCNC: 4.5 MMOL/L (ref 3.5–5)
PROT SERPL-MCNC: 7.7 G/DL (ref 6.4–8.3)
RBC # BLD AUTO: 4.76 M/UL (ref 3.5–5.5)
RBC # BLD: ABNORMAL 10*6/UL
RSV RNA NPH QL NAA+NON-PROBE: DETECTED
RV+EV RNA NPH QL NAA+NON-PROBE: NOT DETECTED
SARS-COV-2 RNA NPH QL NAA+NON-PROBE: NOT DETECTED
SODIUM SERPL-SCNC: 136 MMOL/L (ref 132–146)
SPECIMEN DESCRIPTION: ABNORMAL
TROPONIN I SERPL HS-MCNC: 6 NG/L (ref 0–9)
WBC OTHER # BLD: 6.3 K/UL (ref 4.5–11.5)

## 2024-12-31 PROCEDURE — 6370000000 HC RX 637 (ALT 250 FOR IP)

## 2024-12-31 PROCEDURE — 2500000003 HC RX 250 WO HCPCS

## 2024-12-31 PROCEDURE — 84484 ASSAY OF TROPONIN QUANT: CPT

## 2024-12-31 PROCEDURE — 83735 ASSAY OF MAGNESIUM: CPT

## 2024-12-31 PROCEDURE — 85025 COMPLETE CBC W/AUTO DIFF WBC: CPT

## 2024-12-31 PROCEDURE — 6360000004 HC RX CONTRAST MEDICATION: Performed by: RADIOLOGY

## 2024-12-31 PROCEDURE — 6360000002 HC RX W HCPCS

## 2024-12-31 PROCEDURE — 0202U NFCT DS 22 TRGT SARS-COV-2: CPT

## 2024-12-31 PROCEDURE — 94664 DEMO&/EVAL PT USE INHALER: CPT

## 2024-12-31 PROCEDURE — 80053 COMPREHEN METABOLIC PANEL: CPT

## 2024-12-31 PROCEDURE — 71045 X-RAY EXAM CHEST 1 VIEW: CPT

## 2024-12-31 PROCEDURE — 2060000000 HC ICU INTERMEDIATE R&B

## 2024-12-31 PROCEDURE — 93010 ELECTROCARDIOGRAM REPORT: CPT | Performed by: INTERNAL MEDICINE

## 2024-12-31 PROCEDURE — 94640 AIRWAY INHALATION TREATMENT: CPT

## 2024-12-31 PROCEDURE — 83880 ASSAY OF NATRIURETIC PEPTIDE: CPT

## 2024-12-31 PROCEDURE — 71275 CT ANGIOGRAPHY CHEST: CPT

## 2024-12-31 PROCEDURE — 2700000000 HC OXYGEN THERAPY PER DAY

## 2024-12-31 PROCEDURE — 96374 THER/PROPH/DIAG INJ IV PUSH: CPT

## 2024-12-31 PROCEDURE — 99285 EMERGENCY DEPT VISIT HI MDM: CPT

## 2024-12-31 PROCEDURE — 93005 ELECTROCARDIOGRAM TRACING: CPT

## 2024-12-31 PROCEDURE — 84145 PROCALCITONIN (PCT): CPT

## 2024-12-31 RX ORDER — BUDESONIDE 0.5 MG/2ML
0.5 INHALANT ORAL
Status: DISCONTINUED | OUTPATIENT
Start: 2024-12-31 | End: 2025-01-07 | Stop reason: HOSPADM

## 2024-12-31 RX ORDER — ACETAMINOPHEN 650 MG/1
650 SUPPOSITORY RECTAL EVERY 6 HOURS PRN
Status: DISCONTINUED | OUTPATIENT
Start: 2024-12-31 | End: 2025-01-07 | Stop reason: HOSPADM

## 2024-12-31 RX ORDER — DOXYCYCLINE 100 MG/1
100 CAPSULE ORAL EVERY 12 HOURS SCHEDULED
Status: COMPLETED | OUTPATIENT
Start: 2024-12-31 | End: 2025-01-05

## 2024-12-31 RX ORDER — ENOXAPARIN SODIUM 100 MG/ML
40 INJECTION SUBCUTANEOUS DAILY
Status: DISCONTINUED | OUTPATIENT
Start: 2025-01-01 | End: 2025-01-07 | Stop reason: HOSPADM

## 2024-12-31 RX ORDER — POTASSIUM CHLORIDE 7.45 MG/ML
10 INJECTION INTRAVENOUS PRN
Status: DISCONTINUED | OUTPATIENT
Start: 2024-12-31 | End: 2024-12-31 | Stop reason: RX

## 2024-12-31 RX ORDER — ONDANSETRON 4 MG/1
4 TABLET, FILM COATED ORAL 3 TIMES DAILY PRN
Status: DISCONTINUED | OUTPATIENT
Start: 2024-12-31 | End: 2025-01-07 | Stop reason: HOSPADM

## 2024-12-31 RX ORDER — METHYLPREDNISOLONE SODIUM SUCCINATE 125 MG/2ML
125 INJECTION INTRAMUSCULAR; INTRAVENOUS ONCE
Status: COMPLETED | OUTPATIENT
Start: 2024-12-31 | End: 2024-12-31

## 2024-12-31 RX ORDER — SODIUM CHLORIDE 0.9 % (FLUSH) 0.9 %
5-40 SYRINGE (ML) INJECTION EVERY 12 HOURS SCHEDULED
Status: DISCONTINUED | OUTPATIENT
Start: 2024-12-31 | End: 2025-01-07 | Stop reason: HOSPADM

## 2024-12-31 RX ORDER — LISINOPRIL 5 MG/1
5 TABLET ORAL DAILY
Status: DISCONTINUED | OUTPATIENT
Start: 2025-01-01 | End: 2025-01-07 | Stop reason: HOSPADM

## 2024-12-31 RX ORDER — ACETAMINOPHEN 325 MG/1
650 TABLET ORAL EVERY 6 HOURS PRN
Status: DISCONTINUED | OUTPATIENT
Start: 2024-12-31 | End: 2025-01-07 | Stop reason: HOSPADM

## 2024-12-31 RX ORDER — MAGNESIUM SULFATE IN WATER 40 MG/ML
2000 INJECTION, SOLUTION INTRAVENOUS PRN
Status: DISCONTINUED | OUTPATIENT
Start: 2024-12-31 | End: 2025-01-07 | Stop reason: HOSPADM

## 2024-12-31 RX ORDER — MONTELUKAST SODIUM 10 MG/1
10 TABLET ORAL NIGHTLY
Status: DISCONTINUED | OUTPATIENT
Start: 2024-12-31 | End: 2025-01-07 | Stop reason: HOSPADM

## 2024-12-31 RX ORDER — POTASSIUM CHLORIDE 1500 MG/1
40 TABLET, EXTENDED RELEASE ORAL PRN
Status: DISCONTINUED | OUTPATIENT
Start: 2024-12-31 | End: 2025-01-07 | Stop reason: HOSPADM

## 2024-12-31 RX ORDER — METHYLPREDNISOLONE SODIUM SUCCINATE 125 MG/2ML
60 INJECTION INTRAMUSCULAR; INTRAVENOUS DAILY
Status: DISCONTINUED | OUTPATIENT
Start: 2025-01-01 | End: 2025-01-04

## 2024-12-31 RX ORDER — ARFORMOTEROL TARTRATE 15 UG/2ML
15 SOLUTION RESPIRATORY (INHALATION)
Status: DISCONTINUED | OUTPATIENT
Start: 2024-12-31 | End: 2025-01-07 | Stop reason: HOSPADM

## 2024-12-31 RX ORDER — IPRATROPIUM BROMIDE AND ALBUTEROL SULFATE 2.5; .5 MG/3ML; MG/3ML
1 SOLUTION RESPIRATORY (INHALATION)
Status: DISCONTINUED | OUTPATIENT
Start: 2025-01-01 | End: 2025-01-07 | Stop reason: HOSPADM

## 2024-12-31 RX ORDER — SODIUM CHLORIDE 0.9 % (FLUSH) 0.9 %
5-40 SYRINGE (ML) INJECTION PRN
Status: DISCONTINUED | OUTPATIENT
Start: 2024-12-31 | End: 2025-01-07 | Stop reason: HOSPADM

## 2024-12-31 RX ORDER — IOPAMIDOL 755 MG/ML
75 INJECTION, SOLUTION INTRAVASCULAR
Status: COMPLETED | OUTPATIENT
Start: 2024-12-31 | End: 2024-12-31

## 2024-12-31 RX ORDER — POLYETHYLENE GLYCOL 3350 17 G/17G
17 POWDER, FOR SOLUTION ORAL DAILY PRN
Status: DISCONTINUED | OUTPATIENT
Start: 2024-12-31 | End: 2025-01-07 | Stop reason: HOSPADM

## 2024-12-31 RX ORDER — IPRATROPIUM BROMIDE AND ALBUTEROL SULFATE 2.5; .5 MG/3ML; MG/3ML
3 SOLUTION RESPIRATORY (INHALATION) ONCE
Status: COMPLETED | OUTPATIENT
Start: 2024-12-31 | End: 2024-12-31

## 2024-12-31 RX ORDER — PANTOPRAZOLE SODIUM 40 MG/1
40 TABLET, DELAYED RELEASE ORAL
Status: DISCONTINUED | OUTPATIENT
Start: 2025-01-01 | End: 2025-01-03

## 2024-12-31 RX ORDER — SODIUM CHLORIDE 9 MG/ML
INJECTION, SOLUTION INTRAVENOUS PRN
Status: DISCONTINUED | OUTPATIENT
Start: 2024-12-31 | End: 2025-01-07 | Stop reason: HOSPADM

## 2024-12-31 RX ADMIN — SODIUM CHLORIDE, PRESERVATIVE FREE 10 ML: 5 INJECTION INTRAVENOUS at 21:25

## 2024-12-31 RX ADMIN — IOPAMIDOL 75 ML: 755 INJECTION, SOLUTION INTRAVENOUS at 16:16

## 2024-12-31 RX ADMIN — IPRATROPIUM BROMIDE AND ALBUTEROL SULFATE 1 DOSE: 2.5; .5 SOLUTION RESPIRATORY (INHALATION) at 22:15

## 2024-12-31 RX ADMIN — DOXYCYCLINE HYCLATE 100 MG: 100 CAPSULE ORAL at 21:25

## 2024-12-31 RX ADMIN — BUDESONIDE INHALATION 500 MCG: 0.5 SUSPENSION RESPIRATORY (INHALATION) at 22:14

## 2024-12-31 RX ADMIN — ARFORMOTEROL TARTRATE 15 MCG: 15 SOLUTION RESPIRATORY (INHALATION) at 22:14

## 2024-12-31 RX ADMIN — MONTELUKAST 10 MG: 10 TABLET, FILM COATED ORAL at 21:25

## 2024-12-31 RX ADMIN — METHYLPREDNISOLONE SODIUM SUCCINATE 125 MG: 125 INJECTION INTRAMUSCULAR; INTRAVENOUS at 16:10

## 2024-12-31 RX ADMIN — IPRATROPIUM BROMIDE AND ALBUTEROL SULFATE 3 DOSE: .5; 2.5 SOLUTION RESPIRATORY (INHALATION) at 15:55

## 2024-12-31 ASSESSMENT — PAIN SCALES - GENERAL: PAINLEVEL_OUTOF10: 0

## 2024-12-31 ASSESSMENT — LIFESTYLE VARIABLES
HOW MANY STANDARD DRINKS CONTAINING ALCOHOL DO YOU HAVE ON A TYPICAL DAY: PATIENT DOES NOT DRINK
HOW OFTEN DO YOU HAVE A DRINK CONTAINING ALCOHOL: NEVER

## 2024-12-31 ASSESSMENT — PAIN - FUNCTIONAL ASSESSMENT
PAIN_FUNCTIONAL_ASSESSMENT: NONE - DENIES PAIN
PAIN_FUNCTIONAL_ASSESSMENT: NONE - DENIES PAIN
PAIN_FUNCTIONAL_ASSESSMENT: 0-10

## 2024-12-31 NOTE — H&P
heme/onc consult at this time     IBS  Previously on Bentyl for IBS, follows Dr Henry general surgery for IBS, AVMs of colon with iron deficiency anemia.   PRN Zofran  Home Bentyl- order had  in 2024, will not re-order at this time as patient asymptomatic from IBS standpoint     HTN  Lisinopril 5 mg daily     Code Status: Full, has ACP Docs  Diet: Regular  DVT ppx: Lovenox  GI ppx: Protonix     Consults: None       Case discussed with attending on call Dr. Blas.    Noah Ordonez MD   Family Medicine Resident Physician   2024

## 2025-01-01 PROBLEM — B33.8 RESPIRATORY SYNCYTIAL VIRUS (RSV): Status: ACTIVE | Noted: 2025-01-01

## 2025-01-01 PROBLEM — K92.1 MELANOTIC STOOLS: Status: ACTIVE | Noted: 2025-01-01

## 2025-01-01 PROBLEM — J96.01 ACUTE HYPOXIC RESPIRATORY FAILURE: Status: ACTIVE | Noted: 2025-01-01

## 2025-01-01 LAB
ALBUMIN SERPL-MCNC: 3.8 G/DL (ref 3.5–5.2)
ALP SERPL-CCNC: 97 U/L (ref 35–104)
ALT SERPL-CCNC: 18 U/L (ref 0–32)
ANION GAP SERPL CALCULATED.3IONS-SCNC: 14 MMOL/L (ref 7–16)
ANION GAP SERPL CALCULATED.3IONS-SCNC: 17 MMOL/L (ref 7–16)
AST SERPL-CCNC: 18 U/L (ref 0–31)
BASOPHILS # BLD: 0 K/UL (ref 0–0.2)
BASOPHILS NFR BLD: 0 % (ref 0–2)
BILIRUB SERPL-MCNC: <0.2 MG/DL (ref 0–1.2)
BUN SERPL-MCNC: 18 MG/DL (ref 6–23)
BUN SERPL-MCNC: 28 MG/DL (ref 6–23)
CALCIUM SERPL-MCNC: 9.2 MG/DL (ref 8.6–10.2)
CALCIUM SERPL-MCNC: 9.6 MG/DL (ref 8.6–10.2)
CHLORIDE SERPL-SCNC: 100 MMOL/L (ref 98–107)
CHLORIDE SERPL-SCNC: 102 MMOL/L (ref 98–107)
CO2 SERPL-SCNC: 19 MMOL/L (ref 22–29)
CO2 SERPL-SCNC: 20 MMOL/L (ref 22–29)
CREAT SERPL-MCNC: 1.1 MG/DL (ref 0.5–1)
CREAT SERPL-MCNC: 1.1 MG/DL (ref 0.5–1)
EOSINOPHIL # BLD: 0 K/UL (ref 0.05–0.5)
EOSINOPHILS RELATIVE PERCENT: 0 % (ref 0–6)
ERYTHROCYTE [DISTWIDTH] IN BLOOD BY AUTOMATED COUNT: 19.8 % (ref 11.5–15)
GFR, ESTIMATED: 56 ML/MIN/1.73M2
GFR, ESTIMATED: 58 ML/MIN/1.73M2
GLUCOSE SERPL-MCNC: 174 MG/DL (ref 74–99)
GLUCOSE SERPL-MCNC: 176 MG/DL (ref 74–99)
HBA1C MFR BLD: 5.8 % (ref 4–5.6)
HCT VFR BLD AUTO: 34.4 % (ref 34–48)
HCT VFR BLD AUTO: 37.8 % (ref 34–48)
HGB BLD-MCNC: 11.2 G/DL (ref 11.5–15.5)
HGB BLD-MCNC: 12.2 G/DL (ref 11.5–15.5)
LYMPHOCYTES NFR BLD: 0.35 K/UL (ref 1.5–4)
LYMPHOCYTES RELATIVE PERCENT: 5 % (ref 20–42)
MAGNESIUM SERPL-MCNC: 2.1 MG/DL (ref 1.6–2.6)
MCH RBC QN AUTO: 27.2 PG (ref 26–35)
MCHC RBC AUTO-ENTMCNC: 32.3 G/DL (ref 32–34.5)
MCV RBC AUTO: 84.4 FL (ref 80–99.9)
MONOCYTES NFR BLD: 0.12 K/UL (ref 0.1–0.95)
MONOCYTES NFR BLD: 2 % (ref 2–12)
NEUTROPHILS NFR BLD: 93 % (ref 43–80)
NEUTS SEG NFR BLD: 6.04 K/UL (ref 1.8–7.3)
PLATELET # BLD AUTO: 303 K/UL (ref 130–450)
PMV BLD AUTO: 9.4 FL (ref 7–12)
POTASSIUM SERPL-SCNC: 4.4 MMOL/L (ref 3.5–5)
POTASSIUM SERPL-SCNC: 4.7 MMOL/L (ref 3.5–5)
PROCALCITONIN SERPL-MCNC: 0.1 NG/ML (ref 0–0.08)
PROT SERPL-MCNC: 7.5 G/DL (ref 6.4–8.3)
RBC # BLD AUTO: 4.48 M/UL (ref 3.5–5.5)
RBC # BLD: ABNORMAL 10*6/UL
SODIUM SERPL-SCNC: 136 MMOL/L (ref 132–146)
SODIUM SERPL-SCNC: 136 MMOL/L (ref 132–146)
WBC OTHER # BLD: 6.5 K/UL (ref 4.5–11.5)

## 2025-01-01 PROCEDURE — 2060000000 HC ICU INTERMEDIATE R&B

## 2025-01-01 PROCEDURE — 6360000002 HC RX W HCPCS

## 2025-01-01 PROCEDURE — 2580000003 HC RX 258

## 2025-01-01 PROCEDURE — 6370000000 HC RX 637 (ALT 250 FOR IP)

## 2025-01-01 PROCEDURE — 83036 HEMOGLOBIN GLYCOSYLATED A1C: CPT

## 2025-01-01 PROCEDURE — 85014 HEMATOCRIT: CPT

## 2025-01-01 PROCEDURE — 85025 COMPLETE CBC W/AUTO DIFF WBC: CPT

## 2025-01-01 PROCEDURE — 99222 1ST HOSP IP/OBS MODERATE 55: CPT

## 2025-01-01 PROCEDURE — 83735 ASSAY OF MAGNESIUM: CPT

## 2025-01-01 PROCEDURE — 2700000000 HC OXYGEN THERAPY PER DAY

## 2025-01-01 PROCEDURE — 2500000003 HC RX 250 WO HCPCS

## 2025-01-01 PROCEDURE — 94640 AIRWAY INHALATION TREATMENT: CPT

## 2025-01-01 PROCEDURE — 80053 COMPREHEN METABOLIC PANEL: CPT

## 2025-01-01 PROCEDURE — 80048 BASIC METABOLIC PNL TOTAL CA: CPT

## 2025-01-01 PROCEDURE — 85018 HEMOGLOBIN: CPT

## 2025-01-01 PROCEDURE — 99222 1ST HOSP IP/OBS MODERATE 55: CPT | Performed by: SURGERY

## 2025-01-01 RX ORDER — GUAIFENESIN/DEXTROMETHORPHAN 100-10MG/5
5 SYRUP ORAL EVERY 4 HOURS PRN
Status: DISCONTINUED | OUTPATIENT
Start: 2025-01-01 | End: 2025-01-07 | Stop reason: HOSPADM

## 2025-01-01 RX ORDER — DICYCLOMINE HYDROCHLORIDE 10 MG/1
20 CAPSULE ORAL
Status: DISCONTINUED | OUTPATIENT
Start: 2025-01-01 | End: 2025-01-03

## 2025-01-01 RX ADMIN — PANTOPRAZOLE SODIUM 80 MG: 40 INJECTION, POWDER, FOR SOLUTION INTRAVENOUS at 09:14

## 2025-01-01 RX ADMIN — PANTOPRAZOLE SODIUM 40 MG: 40 TABLET, DELAYED RELEASE ORAL at 06:18

## 2025-01-01 RX ADMIN — SODIUM CHLORIDE, PRESERVATIVE FREE 10 ML: 5 INJECTION INTRAVENOUS at 09:17

## 2025-01-01 RX ADMIN — DOXYCYCLINE HYCLATE 100 MG: 100 CAPSULE ORAL at 09:14

## 2025-01-01 RX ADMIN — IPRATROPIUM BROMIDE AND ALBUTEROL SULFATE 1 DOSE: 2.5; .5 SOLUTION RESPIRATORY (INHALATION) at 12:08

## 2025-01-01 RX ADMIN — IPRATROPIUM BROMIDE AND ALBUTEROL SULFATE 1 DOSE: 2.5; .5 SOLUTION RESPIRATORY (INHALATION) at 08:04

## 2025-01-01 RX ADMIN — IPRATROPIUM BROMIDE AND ALBUTEROL SULFATE 1 DOSE: 2.5; .5 SOLUTION RESPIRATORY (INHALATION) at 21:06

## 2025-01-01 RX ADMIN — DICYCLOMINE HYDROCHLORIDE 20 MG: 10 CAPSULE ORAL at 20:55

## 2025-01-01 RX ADMIN — DOXYCYCLINE HYCLATE 100 MG: 100 CAPSULE ORAL at 20:55

## 2025-01-01 RX ADMIN — BUDESONIDE INHALATION 500 MCG: 0.5 SUSPENSION RESPIRATORY (INHALATION) at 08:04

## 2025-01-01 RX ADMIN — BUDESONIDE INHALATION 500 MCG: 0.5 SUSPENSION RESPIRATORY (INHALATION) at 21:06

## 2025-01-01 RX ADMIN — METHYLPREDNISOLONE SODIUM SUCCINATE 60 MG: 125 INJECTION INTRAMUSCULAR; INTRAVENOUS at 09:14

## 2025-01-01 RX ADMIN — ARFORMOTEROL TARTRATE 15 MCG: 15 SOLUTION RESPIRATORY (INHALATION) at 08:04

## 2025-01-01 RX ADMIN — IPRATROPIUM BROMIDE AND ALBUTEROL SULFATE 1 DOSE: 2.5; .5 SOLUTION RESPIRATORY (INHALATION) at 16:26

## 2025-01-01 RX ADMIN — ACETAMINOPHEN 650 MG: 325 TABLET ORAL at 06:20

## 2025-01-01 RX ADMIN — LISINOPRIL 5 MG: 5 TABLET ORAL at 09:14

## 2025-01-01 RX ADMIN — GUAIFENESIN AND DEXTROMETHORPHAN 5 ML: 100; 10 SYRUP ORAL at 11:34

## 2025-01-01 RX ADMIN — ARFORMOTEROL TARTRATE 15 MCG: 15 SOLUTION RESPIRATORY (INHALATION) at 21:06

## 2025-01-01 RX ADMIN — MONTELUKAST 10 MG: 10 TABLET, FILM COATED ORAL at 20:55

## 2025-01-01 RX ADMIN — SODIUM CHLORIDE, PRESERVATIVE FREE 10 ML: 5 INJECTION INTRAVENOUS at 20:55

## 2025-01-01 RX ADMIN — GUAIFENESIN AND DEXTROMETHORPHAN 5 ML: 100; 10 SYRUP ORAL at 20:55

## 2025-01-01 RX ADMIN — DICYCLOMINE HYDROCHLORIDE 20 MG: 10 CAPSULE ORAL at 09:14

## 2025-01-01 ASSESSMENT — PAIN DESCRIPTION - LOCATION: LOCATION: HEAD

## 2025-01-01 ASSESSMENT — PAIN DESCRIPTION - DESCRIPTORS: DESCRIPTORS: CRUSHING;DISCOMFORT

## 2025-01-01 ASSESSMENT — PAIN SCALES - GENERAL: PAINLEVEL_OUTOF10: 6

## 2025-01-01 NOTE — ED NOTES
ED to Inpatient Handoff Report    Notified nursing staff on 6W that electronic handoff available and patient ready for transport to room 0622.    Safety Risks: Risk of falls    Patient in Restraints: no    Constant Observer or Patient : no    Telemetry Monitoring Ordered :Yes      Cardiac Rhythm: Sinus rhythm    Order to transfer to unit without monitor:N/A    Last MEWS: 1 Time completed: 1916    Deterioration Index Score:   Predictive Model Details          32 (Caution)  Factor Value    Calculated 12/31/2024 19:18 35% Supplemental oxygen Nasal cannula    Deterioration Index Model 34% Age 64 years old     9% Potassium 4.5 mmol/L     7% Respiratory rate 18     7% Pulse 100     4% Pulse oximetry 100 %     3% Systolic 125     2% Sodium 136 mmol/L     0% Temperature 97.6 °F (36.4 °C)     0% Hematocrit 40.1 %     0% WBC count 6.3 k/uL        Vitals:    12/31/24 1151 12/31/24 1152 12/31/24 1613 12/31/24 1916   BP: 129/82  114/63 (!) 125/59   Pulse: 94  98 100   Resp: 21  20 18   Temp:  97.5 °F (36.4 °C)  97.6 °F (36.4 °C)   TempSrc:  Oral  Oral   SpO2: 98%  98% 100%   Weight: 64.4 kg (142 lb)      Height: 1.524 m (5')            Opportunity for questions and clarification was provided.

## 2025-01-01 NOTE — CONSULTS
Surgery History and Physical/Consult Note    CC:    Melena    HPI:  This is a 64 y.o. female with PMH below admitted 12/31/2024 with shortness of breath and respiratory and she was diagnosed with COPD exacerbation and RSV.  Overnight she had a large volume melanotic stool she states.  She has a history of peptic ulcer disease and AVM noted in the cecum on endoscopy last year.  She is okay with upper endoscopy she is hesitant for colonoscopy at this time.      PMH:  Past Medical History:   Diagnosis Date    Anemia     follows with [facility]    Arthritis     Blood in stool     Breast cancer (HCC) 2015    right breast    Cancer (HCC) 2015    breast right, treated with surgery and radiation and po medication    Dry eye syndrome     GERD (gastroesophageal reflux disease)     Hyperlipidemia     Hypertension     Osteoporosis     PONV (postoperative nausea and vomiting)        PSH:  Past Surgical History:   Procedure Laterality Date    APPENDECTOMY      BREAST SURGERY      CARDIOVASCULAR STRESS TEST      CHOLECYSTECTOMY  1984    COLONOSCOPY  09/17/2018    COLONOSCOPY N/A 09/17/2018    COLONOSCOPY POLYPECTOMY SNARE/COLD BIOPSY performed by Gabriela Henry MD at [facility] ENDOSCOPY    COLONOSCOPY N/A 11/06/2020    COLONOSCOPY DIAGNOSTIC performed by Gabriela Henry MD at [facility] ENDOSCOPY    COLONOSCOPY N/A 05/17/2023    COLONOSCOPY DIAGNOSTIC performed by Gabriela Henry MD at [facility] ENDOSCOPY    ENDOSCOPY, COLON, DIAGNOSTIC      HERNIA REPAIR  2009,2010    abdominal    HYSTERECTOMY, TOTAL ABDOMINAL (CERVIX REMOVED) N/A 2009    MASTECTOMY Right 2015    simple, blue dye, with right axillary sentinel lymph node excision    UPPER GASTROINTESTINAL ENDOSCOPY  04/03/2017    UPPER GASTROINTESTINAL ENDOSCOPY N/A 11/06/2020    EGD BIOPSY performed by Gabriela Henry MD at [facility] ENDOSCOPY    UPPER GASTROINTESTINAL ENDOSCOPY N/A 05/17/2023    EGD BIOPSY performed by Gabriela Henry MD at [facility]

## 2025-01-02 ENCOUNTER — APPOINTMENT (OUTPATIENT)
Dept: GENERAL RADIOLOGY | Age: 65
DRG: 190 | End: 2025-01-02
Payer: MEDICARE

## 2025-01-02 ENCOUNTER — ANESTHESIA (OUTPATIENT)
Dept: ENDOSCOPY | Age: 65
End: 2025-01-02
Payer: MEDICARE

## 2025-01-02 ENCOUNTER — ANESTHESIA EVENT (OUTPATIENT)
Dept: ENDOSCOPY | Age: 65
End: 2025-01-02
Payer: MEDICARE

## 2025-01-02 LAB
ALBUMIN SERPL-MCNC: 3.6 G/DL (ref 3.5–5.2)
ALP SERPL-CCNC: 77 U/L (ref 35–104)
ALT SERPL-CCNC: 14 U/L (ref 0–32)
ANION GAP SERPL CALCULATED.3IONS-SCNC: 11 MMOL/L (ref 7–16)
ANION GAP SERPL CALCULATED.3IONS-SCNC: 8 MMOL/L (ref 7–16)
AST SERPL-CCNC: 17 U/L (ref 0–31)
BASOPHILS # BLD: 0.01 K/UL (ref 0–0.2)
BASOPHILS NFR BLD: 0 % (ref 0–2)
BILIRUB SERPL-MCNC: 0.2 MG/DL (ref 0–1.2)
BUN SERPL-MCNC: 33 MG/DL (ref 6–23)
BUN SERPL-MCNC: 35 MG/DL (ref 6–23)
CALCIUM SERPL-MCNC: 9.1 MG/DL (ref 8.6–10.2)
CALCIUM SERPL-MCNC: 9.4 MG/DL (ref 8.6–10.2)
CHLORIDE SERPL-SCNC: 103 MMOL/L (ref 98–107)
CHLORIDE SERPL-SCNC: 103 MMOL/L (ref 98–107)
CO2 SERPL-SCNC: 22 MMOL/L (ref 22–29)
CO2 SERPL-SCNC: 23 MMOL/L (ref 22–29)
CREAT SERPL-MCNC: 1.1 MG/DL (ref 0.5–1)
CREAT SERPL-MCNC: 1.1 MG/DL (ref 0.5–1)
EOSINOPHIL # BLD: 0 K/UL (ref 0.05–0.5)
EOSINOPHILS RELATIVE PERCENT: 0 % (ref 0–6)
ERYTHROCYTE [DISTWIDTH] IN BLOOD BY AUTOMATED COUNT: 20 % (ref 11.5–15)
GFR, ESTIMATED: 55 ML/MIN/1.73M2
GFR, ESTIMATED: 56 ML/MIN/1.73M2
GLUCOSE SERPL-MCNC: 111 MG/DL (ref 74–99)
GLUCOSE SERPL-MCNC: 130 MG/DL (ref 74–99)
HCT VFR BLD AUTO: 32.7 % (ref 34–48)
HGB BLD-MCNC: 10.4 G/DL (ref 11.5–15.5)
IMM GRANULOCYTES # BLD AUTO: 0.07 K/UL (ref 0–0.58)
IMM GRANULOCYTES NFR BLD: 0 % (ref 0–5)
LYMPHOCYTES NFR BLD: 0.85 K/UL (ref 1.5–4)
LYMPHOCYTES RELATIVE PERCENT: 5 % (ref 20–42)
MAGNESIUM SERPL-MCNC: 2.1 MG/DL (ref 1.6–2.6)
MCH RBC QN AUTO: 27.2 PG (ref 26–35)
MCHC RBC AUTO-ENTMCNC: 31.8 G/DL (ref 32–34.5)
MCV RBC AUTO: 85.4 FL (ref 80–99.9)
MONOCYTES NFR BLD: 1.02 K/UL (ref 0.1–0.95)
MONOCYTES NFR BLD: 6 % (ref 2–12)
NEUTROPHILS NFR BLD: 88 % (ref 43–80)
NEUTS SEG NFR BLD: 14.28 K/UL (ref 1.8–7.3)
PLATELET # BLD AUTO: 270 K/UL (ref 130–450)
PMV BLD AUTO: 9.3 FL (ref 7–12)
POTASSIUM SERPL-SCNC: 4.6 MMOL/L (ref 3.5–5)
POTASSIUM SERPL-SCNC: 5.2 MMOL/L (ref 3.5–5)
PROT SERPL-MCNC: 6.6 G/DL (ref 6.4–8.3)
RBC # BLD AUTO: 3.83 M/UL (ref 3.5–5.5)
SODIUM SERPL-SCNC: 134 MMOL/L (ref 132–146)
SODIUM SERPL-SCNC: 136 MMOL/L (ref 132–146)
WBC OTHER # BLD: 16.2 K/UL (ref 4.5–11.5)

## 2025-01-02 PROCEDURE — 6360000002 HC RX W HCPCS: Performed by: NURSE ANESTHETIST, CERTIFIED REGISTERED

## 2025-01-02 PROCEDURE — 83735 ASSAY OF MAGNESIUM: CPT

## 2025-01-02 PROCEDURE — 99232 SBSQ HOSP IP/OBS MODERATE 35: CPT

## 2025-01-02 PROCEDURE — 3700000000 HC ANESTHESIA ATTENDED CARE: Performed by: SURGERY

## 2025-01-02 PROCEDURE — 3700000001 HC ADD 15 MINUTES (ANESTHESIA): Performed by: SURGERY

## 2025-01-02 PROCEDURE — 80048 BASIC METABOLIC PNL TOTAL CA: CPT

## 2025-01-02 PROCEDURE — 7100000010 HC PHASE II RECOVERY - FIRST 15 MIN: Performed by: SURGERY

## 2025-01-02 PROCEDURE — 0DB78ZX EXCISION OF STOMACH, PYLORUS, VIA NATURAL OR ARTIFICIAL OPENING ENDOSCOPIC, DIAGNOSTIC: ICD-10-PCS | Performed by: SURGERY

## 2025-01-02 PROCEDURE — 6370000000 HC RX 637 (ALT 250 FOR IP)

## 2025-01-02 PROCEDURE — 94640 AIRWAY INHALATION TREATMENT: CPT

## 2025-01-02 PROCEDURE — 85025 COMPLETE CBC W/AUTO DIFF WBC: CPT

## 2025-01-02 PROCEDURE — 6360000002 HC RX W HCPCS

## 2025-01-02 PROCEDURE — 3609012400 HC EGD TRANSORAL BIOPSY SINGLE/MULTIPLE: Performed by: SURGERY

## 2025-01-02 PROCEDURE — 71045 X-RAY EXAM CHEST 1 VIEW: CPT

## 2025-01-02 PROCEDURE — 36415 COLL VENOUS BLD VENIPUNCTURE: CPT

## 2025-01-02 PROCEDURE — 2060000000 HC ICU INTERMEDIATE R&B

## 2025-01-02 PROCEDURE — 2580000003 HC RX 258: Performed by: NURSE ANESTHETIST, CERTIFIED REGISTERED

## 2025-01-02 PROCEDURE — 2500000003 HC RX 250 WO HCPCS

## 2025-01-02 PROCEDURE — 80053 COMPREHEN METABOLIC PANEL: CPT

## 2025-01-02 PROCEDURE — 2709999900 HC NON-CHARGEABLE SUPPLY: Performed by: SURGERY

## 2025-01-02 PROCEDURE — 7100000011 HC PHASE II RECOVERY - ADDTL 15 MIN: Performed by: SURGERY

## 2025-01-02 PROCEDURE — 2700000000 HC OXYGEN THERAPY PER DAY

## 2025-01-02 PROCEDURE — 88305 TISSUE EXAM BY PATHOLOGIST: CPT

## 2025-01-02 PROCEDURE — 88342 IMHCHEM/IMCYTCHM 1ST ANTB: CPT

## 2025-01-02 RX ORDER — SODIUM CHLORIDE 9 MG/ML
INJECTION, SOLUTION INTRAVENOUS
Status: DISCONTINUED | OUTPATIENT
Start: 2025-01-02 | End: 2025-01-02 | Stop reason: SDUPTHER

## 2025-01-02 RX ORDER — PROPOFOL 10 MG/ML
INJECTION, EMULSION INTRAVENOUS
Status: DISCONTINUED | OUTPATIENT
Start: 2025-01-02 | End: 2025-01-02 | Stop reason: SDUPTHER

## 2025-01-02 RX ORDER — LIDOCAINE HYDROCHLORIDE 20 MG/ML
INJECTION, SOLUTION EPIDURAL; INFILTRATION; INTRACAUDAL; PERINEURAL
Status: DISCONTINUED | OUTPATIENT
Start: 2025-01-02 | End: 2025-01-02 | Stop reason: SDUPTHER

## 2025-01-02 RX ADMIN — SODIUM CHLORIDE, PRESERVATIVE FREE 10 ML: 5 INJECTION INTRAVENOUS at 09:17

## 2025-01-02 RX ADMIN — ARFORMOTEROL TARTRATE 15 MCG: 15 SOLUTION RESPIRATORY (INHALATION) at 19:59

## 2025-01-02 RX ADMIN — METHYLPREDNISOLONE SODIUM SUCCINATE 60 MG: 125 INJECTION INTRAMUSCULAR; INTRAVENOUS at 09:14

## 2025-01-02 RX ADMIN — SODIUM CHLORIDE, PRESERVATIVE FREE 10 ML: 5 INJECTION INTRAVENOUS at 20:40

## 2025-01-02 RX ADMIN — IPRATROPIUM BROMIDE AND ALBUTEROL SULFATE 1 DOSE: 2.5; .5 SOLUTION RESPIRATORY (INHALATION) at 12:32

## 2025-01-02 RX ADMIN — SODIUM CHLORIDE: 9 INJECTION, SOLUTION INTRAVENOUS at 14:43

## 2025-01-02 RX ADMIN — BUDESONIDE INHALATION 500 MCG: 0.5 SUSPENSION RESPIRATORY (INHALATION) at 19:59

## 2025-01-02 RX ADMIN — LISINOPRIL 5 MG: 5 TABLET ORAL at 09:15

## 2025-01-02 RX ADMIN — IPRATROPIUM BROMIDE AND ALBUTEROL SULFATE 1 DOSE: 2.5; .5 SOLUTION RESPIRATORY (INHALATION) at 19:59

## 2025-01-02 RX ADMIN — DOXYCYCLINE HYCLATE 100 MG: 100 CAPSULE ORAL at 20:39

## 2025-01-02 RX ADMIN — BUDESONIDE INHALATION 500 MCG: 0.5 SUSPENSION RESPIRATORY (INHALATION) at 08:29

## 2025-01-02 RX ADMIN — LIDOCAINE HYDROCHLORIDE 5 ML: 20 INJECTION, SOLUTION EPIDURAL; INFILTRATION; INTRACAUDAL; PERINEURAL at 14:56

## 2025-01-02 RX ADMIN — DICYCLOMINE HYDROCHLORIDE 20 MG: 10 CAPSULE ORAL at 19:02

## 2025-01-02 RX ADMIN — MONTELUKAST 10 MG: 10 TABLET, FILM COATED ORAL at 20:40

## 2025-01-02 RX ADMIN — IPRATROPIUM BROMIDE AND ALBUTEROL SULFATE 1 DOSE: 2.5; .5 SOLUTION RESPIRATORY (INHALATION) at 08:29

## 2025-01-02 RX ADMIN — ARFORMOTEROL TARTRATE 15 MCG: 15 SOLUTION RESPIRATORY (INHALATION) at 08:29

## 2025-01-02 RX ADMIN — PROPOFOL 50 MG: 10 INJECTION, EMULSION INTRAVENOUS at 14:56

## 2025-01-02 RX ADMIN — DOXYCYCLINE HYCLATE 100 MG: 100 CAPSULE ORAL at 09:14

## 2025-01-02 ASSESSMENT — PAIN SCALES - GENERAL
PAINLEVEL_OUTOF10: 0

## 2025-01-02 ASSESSMENT — LIFESTYLE VARIABLES: SMOKING_STATUS: 1

## 2025-01-02 NOTE — ANESTHESIA PRE PROCEDURE
CMP:   Lab Results   Component Value Date/Time     01/02/2025 10:19 AM    K 4.6 01/02/2025 10:19 AM    K 4.5 06/15/2021 04:50 AM     01/02/2025 10:19 AM    CO2 22 01/02/2025 10:19 AM    BUN 35 01/02/2025 10:19 AM    CREATININE 1.1 01/02/2025 10:19 AM    GFRAA 55 06/15/2021 04:50 AM    LABGLOM 55 01/02/2025 10:19 AM    LABGLOM >60 05/30/2023 01:38 PM    GLUCOSE 130 01/02/2025 10:19 AM    CALCIUM 9.4 01/02/2025 10:19 AM    BILITOT 0.2 01/02/2025 06:54 AM    ALKPHOS 77 01/02/2025 06:54 AM    AST 17 01/02/2025 06:54 AM    ALT 14 01/02/2025 06:54 AM       POC Tests: No results for input(s): \"POCGLU\", \"POCNA\", \"POCK\", \"POCCL\", \"POCBUN\", \"POCHEMO\", \"POCHCT\" in the last 72 hours.    Coags: No results found for: \"PROTIME\", \"INR\", \"APTT\"    HCG (If Applicable): No results found for: \"PREGTESTUR\", \"PREGSERUM\", \"HCG\", \"HCGQUANT\"     ABGs: No results found for: \"PHART\", \"PO2ART\", \"ZXW9UDO\", \"YLT2JIP\", \"BEART\", \"N2EXIAHU\"     Type & Screen (If Applicable):  No results found for: \"LABABO\"    Drug/Infectious Status (If Applicable):  No results found for: \"HIV\", \"HEPCAB\"    COVID-19 Screening (If Applicable):   Lab Results   Component Value Date/Time    COVID19 Not Detected 12/31/2024 12:07 PM         Anesthesia Evaluation  Patient summary reviewed and Nursing notes reviewed   history of anesthetic complications: PONV.  Airway: Mallampati: III  TM distance: >3 FB   Neck ROM: full  Mouth opening: > = 3 FB   Dental: normal exam   (+) caps      Pulmonary:normal exam    (+)  COPD:     rhonchi:bilateral decreased breath sounds    current smoker          Patient did not smoke on day of surgery.                 Cardiovascular:  Exercise tolerance: good (>4 METS)  (+) hypertension:        Rhythm: regular  Rate: normal           Beta Blocker:  Not on Beta Blocker         Neuro/Psych:   Negative Neuro/Psych ROS     (-) psychiatric history           GI/Hepatic/Renal:   (+) GERD: well controlled, bowel prep         ROS

## 2025-01-02 NOTE — ACP (ADVANCE CARE PLANNING)
Advance Care Planning   Healthcare Decision Maker:    Primary Decision Maker: RachelleKacey - Child - [phone]    Click here to complete Healthcare Decision Makers including selection of the Healthcare Decision Maker Relationship (ie \"Primary\").

## 2025-01-02 NOTE — OP NOTE
Operative Note: EGD    Tuyet Petersen     DATE OF PROCEDURE: 1/2/2025  SURGEON: Dr. GABRIELA TSE MD, M.D.     PREOPERATIVE DIAGNOSES:   Melena    POSTOPERATIVE DIAGNOSES:  Moderate gastritis  Hiatal hernia  GERD      OPERATION:    EGD esophagogastroduodenoscopy with antral biopsies    SPECIMENS:  ID Type Source Tests Collected by Time Destination   A : antral bx Tissue Stomach SURGICAL PATHOLOGY Gabriela Tse MD 1/2/2025 9868        ANESTHESIA: LMAC    BLOOD LOSS: Minimal    CONSENT AND INDICATIONS:  This is a 64 y.o. year old female who is having the above. I have discussed with the patient and/or the patient representative the indication, alternatives, and the possible risks and/or complications of the planned procedure and the anesthesia methods. The patient and/or patient representative appear to understand and agree to proceed.      PROCEDURE: The patient was placed on the table and sedated by anesthesia. Bite block was placed. A lubricated scope was easily passed into the upper esophagus which looked normal. The distal esophagus looked abnormal: GERD. The scope was passed into the stomach and retroflexed. There was a hiatal hernia. The scope was passed down toward the pylorus. The antral mucosa all looked abnormal: moderate gastritis. Biopsy was taken. The scope was then passed through the pylorus into the duodenal bulb which looked normal, then around to the distal duodenum which looked normal, and the scope was then withdrawn. The patient tolerated the procedure well.    PLAN:   Follow up pathology results.    No upper GI source of bleeding.    Physician Signature: Electronically signed by Dr. GABRIELA TSE MD MCharlyD. FACS    Send copy of H&P to PCP, Elvis Ren MD and referring physician, No ref. provider found

## 2025-01-02 NOTE — ANESTHESIA POSTPROCEDURE EVALUATION
Department of Anesthesiology  Postprocedure Note    Patient: Tuyet Petersen  MRN: [id number]  YOB: 1960  Date of evaluation: 1/2/2025    Procedure Summary       Date: 01/02/25 Room / Location: [address]    Anesthesia Start: 1450 Anesthesia Stop: 1501    Procedure: ESOPHAGOGASTRODUODENOSCOPY BIOPSY Diagnosis:       Melena      (Melena [K92.1])    Surgeons: Gabriela Henry MD Responsible Provider: Daysi Shah DO    Anesthesia Type: MAC ASA Status: 3            Anesthesia Type: MAC    Jake Phase I:      Jake Phase II:      Anesthesia Post Evaluation    Patient location during evaluation: bedside  Patient participation: complete - patient participated  Level of consciousness: awake  Pain score: 0  Airway patency: patent  Nausea & Vomiting: no nausea and no vomiting  Cardiovascular status: blood pressure returned to baseline and hemodynamically stable  Respiratory status: acceptable  Hydration status: stable  Pain management: adequate and satisfactory to patient        No notable events documented.

## 2025-01-02 NOTE — CARE COORDINATION
Social work / Discharge planning:          Patient is independent from home alone.    Currently on IV Solumedrol.     Currently requiring 2 liters of oxygen.    Will need pulse ox testing when discharge is anticipated.     General surgery is following.    Per IDR, plan is for EGD.   Electronically signed by ESTELA Mercado on 1/2/2025 at 10:31 AM

## 2025-01-03 LAB
ALBUMIN SERPL-MCNC: 3.5 G/DL (ref 3.5–5.2)
ALP SERPL-CCNC: 75 U/L (ref 35–104)
ALT SERPL-CCNC: 12 U/L (ref 0–32)
ANION GAP SERPL CALCULATED.3IONS-SCNC: 12 MMOL/L (ref 7–16)
AST SERPL-CCNC: 16 U/L (ref 0–31)
BASOPHILS # BLD: 0 K/UL (ref 0–0.2)
BASOPHILS NFR BLD: 0 % (ref 0–2)
BILIRUB SERPL-MCNC: 0.2 MG/DL (ref 0–1.2)
BUN SERPL-MCNC: 29 MG/DL (ref 6–23)
CALCIUM SERPL-MCNC: 9.1 MG/DL (ref 8.6–10.2)
CHLORIDE SERPL-SCNC: 103 MMOL/L (ref 98–107)
CO2 SERPL-SCNC: 20 MMOL/L (ref 22–29)
CREAT SERPL-MCNC: 1 MG/DL (ref 0.5–1)
EOSINOPHIL # BLD: 0 K/UL (ref 0.05–0.5)
EOSINOPHILS RELATIVE PERCENT: 0 % (ref 0–6)
ERYTHROCYTE [DISTWIDTH] IN BLOOD BY AUTOMATED COUNT: 20.1 % (ref 11.5–15)
GFR, ESTIMATED: 65 ML/MIN/1.73M2
GLUCOSE SERPL-MCNC: 93 MG/DL (ref 74–99)
HCT VFR BLD AUTO: 29.8 % (ref 34–48)
HGB BLD-MCNC: 9.3 G/DL (ref 11.5–15.5)
LYMPHOCYTES NFR BLD: 0.55 K/UL (ref 1.5–4)
LYMPHOCYTES RELATIVE PERCENT: 5 % (ref 20–42)
MCH RBC QN AUTO: 27.4 PG (ref 26–35)
MCHC RBC AUTO-ENTMCNC: 31.2 G/DL (ref 32–34.5)
MCV RBC AUTO: 87.6 FL (ref 80–99.9)
MONOCYTES NFR BLD: 0.37 K/UL (ref 0.1–0.95)
MONOCYTES NFR BLD: 4 % (ref 2–12)
NEUTROPHILS NFR BLD: 91 % (ref 43–80)
NEUTS SEG NFR BLD: 9.59 K/UL (ref 1.8–7.3)
PLATELET # BLD AUTO: 258 K/UL (ref 130–450)
PMV BLD AUTO: 9.4 FL (ref 7–12)
POTASSIUM SERPL-SCNC: 4.7 MMOL/L (ref 3.5–5)
PROT SERPL-MCNC: 6.3 G/DL (ref 6.4–8.3)
RBC # BLD AUTO: 3.4 M/UL (ref 3.5–5.5)
RBC # BLD: ABNORMAL 10*6/UL
SODIUM SERPL-SCNC: 135 MMOL/L (ref 132–146)
WBC OTHER # BLD: 10.5 K/UL (ref 4.5–11.5)

## 2025-01-03 PROCEDURE — 6370000000 HC RX 637 (ALT 250 FOR IP)

## 2025-01-03 PROCEDURE — 6360000002 HC RX W HCPCS

## 2025-01-03 PROCEDURE — 2060000000 HC ICU INTERMEDIATE R&B

## 2025-01-03 PROCEDURE — 2700000000 HC OXYGEN THERAPY PER DAY

## 2025-01-03 PROCEDURE — 99232 SBSQ HOSP IP/OBS MODERATE 35: CPT | Performed by: FAMILY MEDICINE

## 2025-01-03 PROCEDURE — 2500000003 HC RX 250 WO HCPCS

## 2025-01-03 PROCEDURE — 94640 AIRWAY INHALATION TREATMENT: CPT

## 2025-01-03 PROCEDURE — 85025 COMPLETE CBC W/AUTO DIFF WBC: CPT

## 2025-01-03 PROCEDURE — 80053 COMPREHEN METABOLIC PANEL: CPT

## 2025-01-03 RX ORDER — PANTOPRAZOLE SODIUM 40 MG/1
40 TABLET, DELAYED RELEASE ORAL
Status: DISCONTINUED | OUTPATIENT
Start: 2025-01-03 | End: 2025-01-05

## 2025-01-03 RX ORDER — DICYCLOMINE HYDROCHLORIDE 10 MG/1
20 CAPSULE ORAL 2 TIMES DAILY
Status: DISCONTINUED | OUTPATIENT
Start: 2025-01-04 | End: 2025-01-06

## 2025-01-03 RX ADMIN — DOXYCYCLINE HYCLATE 100 MG: 100 CAPSULE ORAL at 20:25

## 2025-01-03 RX ADMIN — GUAIFENESIN AND DEXTROMETHORPHAN 5 ML: 100; 10 SYRUP ORAL at 22:30

## 2025-01-03 RX ADMIN — BUDESONIDE INHALATION 500 MCG: 0.5 SUSPENSION RESPIRATORY (INHALATION) at 08:18

## 2025-01-03 RX ADMIN — METHYLPREDNISOLONE SODIUM SUCCINATE 60 MG: 125 INJECTION INTRAMUSCULAR; INTRAVENOUS at 07:58

## 2025-01-03 RX ADMIN — PANTOPRAZOLE SODIUM 40 MG: 40 TABLET, DELAYED RELEASE ORAL at 05:41

## 2025-01-03 RX ADMIN — SODIUM CHLORIDE, PRESERVATIVE FREE 10 ML: 5 INJECTION INTRAVENOUS at 20:28

## 2025-01-03 RX ADMIN — DOXYCYCLINE HYCLATE 100 MG: 100 CAPSULE ORAL at 07:58

## 2025-01-03 RX ADMIN — IPRATROPIUM BROMIDE AND ALBUTEROL SULFATE 1 DOSE: 2.5; .5 SOLUTION RESPIRATORY (INHALATION) at 21:18

## 2025-01-03 RX ADMIN — MONTELUKAST 10 MG: 10 TABLET, FILM COATED ORAL at 20:26

## 2025-01-03 RX ADMIN — SODIUM CHLORIDE, PRESERVATIVE FREE 10 ML: 5 INJECTION INTRAVENOUS at 07:59

## 2025-01-03 RX ADMIN — DICYCLOMINE HYDROCHLORIDE 20 MG: 10 CAPSULE ORAL at 18:15

## 2025-01-03 RX ADMIN — IPRATROPIUM BROMIDE AND ALBUTEROL SULFATE 1 DOSE: 2.5; .5 SOLUTION RESPIRATORY (INHALATION) at 08:18

## 2025-01-03 RX ADMIN — ACETAMINOPHEN 650 MG: 325 TABLET ORAL at 18:23

## 2025-01-03 RX ADMIN — ARFORMOTEROL TARTRATE 15 MCG: 15 SOLUTION RESPIRATORY (INHALATION) at 08:18

## 2025-01-03 RX ADMIN — BUDESONIDE INHALATION 500 MCG: 0.5 SUSPENSION RESPIRATORY (INHALATION) at 21:18

## 2025-01-03 RX ADMIN — ARFORMOTEROL TARTRATE 15 MCG: 15 SOLUTION RESPIRATORY (INHALATION) at 21:18

## 2025-01-03 RX ADMIN — LISINOPRIL 5 MG: 5 TABLET ORAL at 07:58

## 2025-01-03 RX ADMIN — GUAIFENESIN AND DEXTROMETHORPHAN 5 ML: 100; 10 SYRUP ORAL at 12:34

## 2025-01-03 RX ADMIN — IPRATROPIUM BROMIDE AND ALBUTEROL SULFATE 1 DOSE: 2.5; .5 SOLUTION RESPIRATORY (INHALATION) at 12:14

## 2025-01-03 RX ADMIN — IPRATROPIUM BROMIDE AND ALBUTEROL SULFATE 1 DOSE: 2.5; .5 SOLUTION RESPIRATORY (INHALATION) at 16:12

## 2025-01-03 RX ADMIN — DICYCLOMINE HYDROCHLORIDE 20 MG: 10 CAPSULE ORAL at 05:41

## 2025-01-03 RX ADMIN — DICYCLOMINE HYDROCHLORIDE 20 MG: 10 CAPSULE ORAL at 12:34

## 2025-01-03 RX ADMIN — PANTOPRAZOLE SODIUM 40 MG: 40 TABLET, DELAYED RELEASE ORAL at 18:15

## 2025-01-03 ASSESSMENT — PAIN SCALES - GENERAL
PAINLEVEL_OUTOF10: 0
PAINLEVEL_OUTOF10: 5
PAINLEVEL_OUTOF10: 2
PAINLEVEL_OUTOF10: 5
PAINLEVEL_OUTOF10: 0
PAINLEVEL_OUTOF10: 0

## 2025-01-03 ASSESSMENT — PAIN DESCRIPTION - LOCATION
LOCATION: HEAD
LOCATION: GENERALIZED

## 2025-01-03 ASSESSMENT — PAIN DESCRIPTION - DESCRIPTORS
DESCRIPTORS: ACHING;DULL;DISCOMFORT
DESCRIPTORS: ACHING;DULL;DISCOMFORT

## 2025-01-03 ASSESSMENT — PAIN DESCRIPTION - ORIENTATION
ORIENTATION: LEFT;RIGHT;LOWER;MID;UPPER
ORIENTATION: ANTERIOR;POSTERIOR

## 2025-01-03 ASSESSMENT — PAIN - FUNCTIONAL ASSESSMENT
PAIN_FUNCTIONAL_ASSESSMENT: PREVENTS OR INTERFERES SOME ACTIVE ACTIVITIES AND ADLS
PAIN_FUNCTIONAL_ASSESSMENT: PREVENTS OR INTERFERES SOME ACTIVE ACTIVITIES AND ADLS

## 2025-01-03 NOTE — CARE COORDINATION
Social work / Discharge planning:           Patient remains on IV Solumedrol.     Requiring 2 liters of oxygen.   Will need pulse ox testing when discharge is anticipated.    Electronically signed by ESTELA Mercado on 1/3/2025 at 11:13 AM

## 2025-01-04 LAB
ALBUMIN SERPL-MCNC: 3.6 G/DL (ref 3.5–5.2)
ALP SERPL-CCNC: 70 U/L (ref 35–104)
ALT SERPL-CCNC: 13 U/L (ref 0–32)
ANION GAP SERPL CALCULATED.3IONS-SCNC: 8 MMOL/L (ref 7–16)
AST SERPL-CCNC: 13 U/L (ref 0–31)
BASOPHILS # BLD: 0 K/UL (ref 0–0.2)
BASOPHILS NFR BLD: 0 % (ref 0–2)
BILIRUB SERPL-MCNC: <0.2 MG/DL (ref 0–1.2)
BUN SERPL-MCNC: 28 MG/DL (ref 6–23)
CALCIUM SERPL-MCNC: 9.1 MG/DL (ref 8.6–10.2)
CHLORIDE SERPL-SCNC: 104 MMOL/L (ref 98–107)
CO2 SERPL-SCNC: 23 MMOL/L (ref 22–29)
CREAT SERPL-MCNC: 0.9 MG/DL (ref 0.5–1)
EOSINOPHIL # BLD: 0 K/UL (ref 0.05–0.5)
EOSINOPHILS RELATIVE PERCENT: 0 % (ref 0–6)
ERYTHROCYTE [DISTWIDTH] IN BLOOD BY AUTOMATED COUNT: 20 % (ref 11.5–15)
GFR, ESTIMATED: 68 ML/MIN/1.73M2
GLUCOSE SERPL-MCNC: 110 MG/DL (ref 74–99)
HCT VFR BLD AUTO: 28.9 % (ref 34–48)
HGB BLD-MCNC: 9.3 G/DL (ref 11.5–15.5)
IMM GRANULOCYTES # BLD AUTO: 0.11 K/UL (ref 0–0.58)
IMM GRANULOCYTES NFR BLD: 1 % (ref 0–5)
LYMPHOCYTES NFR BLD: 1.03 K/UL (ref 1.5–4)
LYMPHOCYTES RELATIVE PERCENT: 11 % (ref 20–42)
MCH RBC QN AUTO: 27.9 PG (ref 26–35)
MCHC RBC AUTO-ENTMCNC: 32.2 G/DL (ref 32–34.5)
MCV RBC AUTO: 86.8 FL (ref 80–99.9)
MONOCYTES NFR BLD: 0.78 K/UL (ref 0.1–0.95)
MONOCYTES NFR BLD: 9 % (ref 2–12)
NEUTROPHILS NFR BLD: 79 % (ref 43–80)
NEUTS SEG NFR BLD: 7.28 K/UL (ref 1.8–7.3)
PLATELET # BLD AUTO: 269 K/UL (ref 130–450)
PMV BLD AUTO: 9.3 FL (ref 7–12)
POTASSIUM SERPL-SCNC: 4.5 MMOL/L (ref 3.5–5)
PROT SERPL-MCNC: 6.3 G/DL (ref 6.4–8.3)
RBC # BLD AUTO: 3.33 M/UL (ref 3.5–5.5)
SODIUM SERPL-SCNC: 135 MMOL/L (ref 132–146)
WBC OTHER # BLD: 9.2 K/UL (ref 4.5–11.5)

## 2025-01-04 PROCEDURE — 85025 COMPLETE CBC W/AUTO DIFF WBC: CPT

## 2025-01-04 PROCEDURE — 6370000000 HC RX 637 (ALT 250 FOR IP)

## 2025-01-04 PROCEDURE — 99232 SBSQ HOSP IP/OBS MODERATE 35: CPT | Performed by: FAMILY MEDICINE

## 2025-01-04 PROCEDURE — 6360000002 HC RX W HCPCS

## 2025-01-04 PROCEDURE — 6370000000 HC RX 637 (ALT 250 FOR IP): Performed by: STUDENT IN AN ORGANIZED HEALTH CARE EDUCATION/TRAINING PROGRAM

## 2025-01-04 PROCEDURE — 80053 COMPREHEN METABOLIC PANEL: CPT

## 2025-01-04 PROCEDURE — 2060000000 HC ICU INTERMEDIATE R&B

## 2025-01-04 PROCEDURE — 2500000003 HC RX 250 WO HCPCS

## 2025-01-04 PROCEDURE — 2700000000 HC OXYGEN THERAPY PER DAY

## 2025-01-04 PROCEDURE — 94640 AIRWAY INHALATION TREATMENT: CPT

## 2025-01-04 RX ORDER — PREDNISONE 20 MG/1
60 TABLET ORAL DAILY
Status: DISCONTINUED | OUTPATIENT
Start: 2025-01-04 | End: 2025-01-05

## 2025-01-04 RX ORDER — BISACODYL 5 MG/1
10 TABLET, DELAYED RELEASE ORAL ONCE
Status: COMPLETED | OUTPATIENT
Start: 2025-01-05 | End: 2025-01-05

## 2025-01-04 RX ORDER — MAGNESIUM CARB/ALUMINUM HYDROX 105-160MG
592 TABLET,CHEWABLE ORAL ONCE
Status: COMPLETED | OUTPATIENT
Start: 2025-01-05 | End: 2025-01-05

## 2025-01-04 RX ADMIN — BUDESONIDE INHALATION 500 MCG: 0.5 SUSPENSION RESPIRATORY (INHALATION) at 09:40

## 2025-01-04 RX ADMIN — IPRATROPIUM BROMIDE AND ALBUTEROL SULFATE 1 DOSE: 2.5; .5 SOLUTION RESPIRATORY (INHALATION) at 16:36

## 2025-01-04 RX ADMIN — DICYCLOMINE HYDROCHLORIDE 20 MG: 10 CAPSULE ORAL at 19:41

## 2025-01-04 RX ADMIN — PREDNISONE 60 MG: 20 TABLET ORAL at 14:02

## 2025-01-04 RX ADMIN — IPRATROPIUM BROMIDE AND ALBUTEROL SULFATE 1 DOSE: 2.5; .5 SOLUTION RESPIRATORY (INHALATION) at 09:40

## 2025-01-04 RX ADMIN — IPRATROPIUM BROMIDE AND ALBUTEROL SULFATE 1 DOSE: 2.5; .5 SOLUTION RESPIRATORY (INHALATION) at 12:39

## 2025-01-04 RX ADMIN — ARFORMOTEROL TARTRATE 15 MCG: 15 SOLUTION RESPIRATORY (INHALATION) at 20:19

## 2025-01-04 RX ADMIN — DICYCLOMINE HYDROCHLORIDE 20 MG: 10 CAPSULE ORAL at 09:37

## 2025-01-04 RX ADMIN — MONTELUKAST 10 MG: 10 TABLET, FILM COATED ORAL at 19:41

## 2025-01-04 RX ADMIN — LISINOPRIL 5 MG: 5 TABLET ORAL at 09:37

## 2025-01-04 RX ADMIN — DOXYCYCLINE HYCLATE 100 MG: 100 CAPSULE ORAL at 09:37

## 2025-01-04 RX ADMIN — IPRATROPIUM BROMIDE AND ALBUTEROL SULFATE 1 DOSE: 2.5; .5 SOLUTION RESPIRATORY (INHALATION) at 20:19

## 2025-01-04 RX ADMIN — DOXYCYCLINE HYCLATE 100 MG: 100 CAPSULE ORAL at 19:41

## 2025-01-04 RX ADMIN — SODIUM CHLORIDE, PRESERVATIVE FREE 10 ML: 5 INJECTION INTRAVENOUS at 09:49

## 2025-01-04 RX ADMIN — BUDESONIDE INHALATION 500 MCG: 0.5 SUSPENSION RESPIRATORY (INHALATION) at 20:19

## 2025-01-04 RX ADMIN — PANTOPRAZOLE SODIUM 40 MG: 40 TABLET, DELAYED RELEASE ORAL at 16:15

## 2025-01-04 RX ADMIN — PANTOPRAZOLE SODIUM 40 MG: 40 TABLET, DELAYED RELEASE ORAL at 05:12

## 2025-01-04 RX ADMIN — ARFORMOTEROL TARTRATE 15 MCG: 15 SOLUTION RESPIRATORY (INHALATION) at 09:40

## 2025-01-04 ASSESSMENT — PAIN SCALES - GENERAL
PAINLEVEL_OUTOF10: 0
PAINLEVEL_OUTOF10: 0
PAINLEVEL_OUTOF10: 3
PAINLEVEL_OUTOF10: 0

## 2025-01-05 LAB
ALBUMIN SERPL-MCNC: 3.6 G/DL (ref 3.5–5.2)
ALP SERPL-CCNC: 70 U/L (ref 35–104)
ALT SERPL-CCNC: 17 U/L (ref 0–32)
ANION GAP SERPL CALCULATED.3IONS-SCNC: 11 MMOL/L (ref 7–16)
AST SERPL-CCNC: 15 U/L (ref 0–31)
BASOPHILS # BLD: 0.01 K/UL (ref 0–0.2)
BASOPHILS NFR BLD: 0 % (ref 0–2)
BILIRUB SERPL-MCNC: <0.2 MG/DL (ref 0–1.2)
BUN SERPL-MCNC: 27 MG/DL (ref 6–23)
CALCIUM SERPL-MCNC: 9 MG/DL (ref 8.6–10.2)
CHLORIDE SERPL-SCNC: 103 MMOL/L (ref 98–107)
CO2 SERPL-SCNC: 24 MMOL/L (ref 22–29)
CREAT SERPL-MCNC: 0.9 MG/DL (ref 0.5–1)
EOSINOPHIL # BLD: 0 K/UL (ref 0.05–0.5)
EOSINOPHILS RELATIVE PERCENT: 0 % (ref 0–6)
ERYTHROCYTE [DISTWIDTH] IN BLOOD BY AUTOMATED COUNT: 19.9 % (ref 11.5–15)
GFR, ESTIMATED: 67 ML/MIN/1.73M2
GLUCOSE SERPL-MCNC: 127 MG/DL (ref 74–99)
HCT VFR BLD AUTO: 29 % (ref 34–48)
HGB BLD-MCNC: 9.3 G/DL (ref 11.5–15.5)
IMM GRANULOCYTES # BLD AUTO: 0.18 K/UL (ref 0–0.58)
IMM GRANULOCYTES NFR BLD: 2 % (ref 0–5)
LYMPHOCYTES NFR BLD: 0.93 K/UL (ref 1.5–4)
LYMPHOCYTES RELATIVE PERCENT: 10 % (ref 20–42)
MCH RBC QN AUTO: 27.7 PG (ref 26–35)
MCHC RBC AUTO-ENTMCNC: 32.1 G/DL (ref 32–34.5)
MCV RBC AUTO: 86.3 FL (ref 80–99.9)
MONOCYTES NFR BLD: 0.51 K/UL (ref 0.1–0.95)
MONOCYTES NFR BLD: 5 % (ref 2–12)
NEUTROPHILS NFR BLD: 83 % (ref 43–80)
NEUTS SEG NFR BLD: 7.83 K/UL (ref 1.8–7.3)
PLATELET # BLD AUTO: 284 K/UL (ref 130–450)
PMV BLD AUTO: 9.1 FL (ref 7–12)
POTASSIUM SERPL-SCNC: 4.9 MMOL/L (ref 3.5–5)
PROT SERPL-MCNC: 6.3 G/DL (ref 6.4–8.3)
RBC # BLD AUTO: 3.36 M/UL (ref 3.5–5.5)
SODIUM SERPL-SCNC: 138 MMOL/L (ref 132–146)
WBC OTHER # BLD: 9.5 K/UL (ref 4.5–11.5)

## 2025-01-05 PROCEDURE — 2700000000 HC OXYGEN THERAPY PER DAY

## 2025-01-05 PROCEDURE — 6370000000 HC RX 637 (ALT 250 FOR IP): Performed by: SURGERY

## 2025-01-05 PROCEDURE — 80053 COMPREHEN METABOLIC PANEL: CPT

## 2025-01-05 PROCEDURE — 6370000000 HC RX 637 (ALT 250 FOR IP)

## 2025-01-05 PROCEDURE — 85025 COMPLETE CBC W/AUTO DIFF WBC: CPT

## 2025-01-05 PROCEDURE — 6360000002 HC RX W HCPCS

## 2025-01-05 PROCEDURE — 2060000000 HC ICU INTERMEDIATE R&B

## 2025-01-05 PROCEDURE — 94640 AIRWAY INHALATION TREATMENT: CPT

## 2025-01-05 PROCEDURE — 99231 SBSQ HOSP IP/OBS SF/LOW 25: CPT | Performed by: FAMILY MEDICINE

## 2025-01-05 PROCEDURE — 6370000000 HC RX 637 (ALT 250 FOR IP): Performed by: STUDENT IN AN ORGANIZED HEALTH CARE EDUCATION/TRAINING PROGRAM

## 2025-01-05 PROCEDURE — 2500000003 HC RX 250 WO HCPCS

## 2025-01-05 RX ORDER — PANTOPRAZOLE SODIUM 40 MG/1
40 TABLET, DELAYED RELEASE ORAL
Status: DISCONTINUED | OUTPATIENT
Start: 2025-01-06 | End: 2025-01-07 | Stop reason: HOSPADM

## 2025-01-05 RX ORDER — PROMETHAZINE HYDROCHLORIDE 25 MG/ML
6.25 INJECTION, SOLUTION INTRAMUSCULAR; INTRAVENOUS EVERY 6 HOURS PRN
Status: DISCONTINUED | OUTPATIENT
Start: 2025-01-05 | End: 2025-01-07 | Stop reason: HOSPADM

## 2025-01-05 RX ORDER — PREDNISONE 20 MG/1
20 TABLET ORAL 2 TIMES DAILY
Status: DISCONTINUED | OUTPATIENT
Start: 2025-01-06 | End: 2025-01-07 | Stop reason: HOSPADM

## 2025-01-05 RX ADMIN — LISINOPRIL 5 MG: 5 TABLET ORAL at 10:18

## 2025-01-05 RX ADMIN — MAJOR MAGNESIUM CITRATE ORAL SOLUTION - LEMON 592 ML: 1.75 LIQUID ORAL at 16:05

## 2025-01-05 RX ADMIN — ARFORMOTEROL TARTRATE 15 MCG: 15 SOLUTION RESPIRATORY (INHALATION) at 09:22

## 2025-01-05 RX ADMIN — PANTOPRAZOLE SODIUM 40 MG: 40 TABLET, DELAYED RELEASE ORAL at 05:54

## 2025-01-05 RX ADMIN — IPRATROPIUM BROMIDE AND ALBUTEROL SULFATE 1 DOSE: 2.5; .5 SOLUTION RESPIRATORY (INHALATION) at 12:54

## 2025-01-05 RX ADMIN — SODIUM CHLORIDE, PRESERVATIVE FREE 10 ML: 5 INJECTION INTRAVENOUS at 10:20

## 2025-01-05 RX ADMIN — PREDNISONE 60 MG: 20 TABLET ORAL at 10:18

## 2025-01-05 RX ADMIN — DOXYCYCLINE HYCLATE 100 MG: 100 CAPSULE ORAL at 10:18

## 2025-01-05 RX ADMIN — ACETAMINOPHEN 650 MG: 325 TABLET ORAL at 10:40

## 2025-01-05 RX ADMIN — BISACODYL 10 MG: 5 TABLET, COATED ORAL at 20:15

## 2025-01-05 RX ADMIN — BISACODYL 10 MG: 5 TABLET, COATED ORAL at 12:01

## 2025-01-05 RX ADMIN — MONTELUKAST 10 MG: 10 TABLET, FILM COATED ORAL at 20:15

## 2025-01-05 RX ADMIN — BUDESONIDE INHALATION 500 MCG: 0.5 SUSPENSION RESPIRATORY (INHALATION) at 09:22

## 2025-01-05 RX ADMIN — ONDANSETRON HYDROCHLORIDE 4 MG: 4 TABLET, FILM COATED ORAL at 17:36

## 2025-01-05 RX ADMIN — ONDANSETRON HYDROCHLORIDE 4 MG: 4 TABLET, FILM COATED ORAL at 20:14

## 2025-01-05 RX ADMIN — ONDANSETRON HYDROCHLORIDE 4 MG: 4 TABLET, FILM COATED ORAL at 11:25

## 2025-01-05 RX ADMIN — IPRATROPIUM BROMIDE AND ALBUTEROL SULFATE 1 DOSE: 2.5; .5 SOLUTION RESPIRATORY (INHALATION) at 09:22

## 2025-01-05 ASSESSMENT — PAIN SCALES - GENERAL
PAINLEVEL_OUTOF10: 0
PAINLEVEL_OUTOF10: 1
PAINLEVEL_OUTOF10: 0
PAINLEVEL_OUTOF10: 5

## 2025-01-05 ASSESSMENT — PAIN SCALES - WONG BAKER: WONGBAKER_NUMERICALRESPONSE: HURTS A LITTLE BIT

## 2025-01-05 ASSESSMENT — PAIN DESCRIPTION - ORIENTATION: ORIENTATION: RIGHT;LEFT

## 2025-01-05 ASSESSMENT — PAIN DESCRIPTION - LOCATION: LOCATION: LEG

## 2025-01-05 ASSESSMENT — PAIN DESCRIPTION - DESCRIPTORS: DESCRIPTORS: ACHING;DISCOMFORT

## 2025-01-06 ENCOUNTER — ANESTHESIA (OUTPATIENT)
Dept: ENDOSCOPY | Age: 65
DRG: 190 | End: 2025-01-06
Payer: MEDICARE

## 2025-01-06 ENCOUNTER — ANESTHESIA EVENT (OUTPATIENT)
Dept: ENDOSCOPY | Age: 65
DRG: 190 | End: 2025-01-06
Payer: MEDICARE

## 2025-01-06 LAB
ALBUMIN SERPL-MCNC: 4.2 G/DL (ref 3.5–5.2)
ALP SERPL-CCNC: 80 U/L (ref 35–104)
ALT SERPL-CCNC: 40 U/L (ref 0–32)
ANION GAP SERPL CALCULATED.3IONS-SCNC: 8 MMOL/L (ref 7–16)
AST SERPL-CCNC: 23 U/L (ref 0–31)
BASOPHILS # BLD: 0.04 K/UL (ref 0–0.2)
BASOPHILS NFR BLD: 0 % (ref 0–2)
BILIRUB SERPL-MCNC: 0.2 MG/DL (ref 0–1.2)
BUN SERPL-MCNC: 29 MG/DL (ref 6–23)
CALCIUM SERPL-MCNC: 9.3 MG/DL (ref 8.6–10.2)
CHLORIDE SERPL-SCNC: 103 MMOL/L (ref 98–107)
CO2 SERPL-SCNC: 28 MMOL/L (ref 22–29)
CREAT SERPL-MCNC: 1.2 MG/DL (ref 0.5–1)
EOSINOPHIL # BLD: 0.01 K/UL (ref 0.05–0.5)
EOSINOPHILS RELATIVE PERCENT: 0 % (ref 0–6)
ERYTHROCYTE [DISTWIDTH] IN BLOOD BY AUTOMATED COUNT: 20.4 % (ref 11.5–15)
GFR, ESTIMATED: 48 ML/MIN/1.73M2
GLUCOSE SERPL-MCNC: 98 MG/DL (ref 74–99)
HCT VFR BLD AUTO: 35.2 % (ref 34–48)
HGB BLD-MCNC: 11 G/DL (ref 11.5–15.5)
IMM GRANULOCYTES # BLD AUTO: 0.35 K/UL (ref 0–0.58)
IMM GRANULOCYTES NFR BLD: 3 % (ref 0–5)
LYMPHOCYTES NFR BLD: 2.42 K/UL (ref 1.5–4)
LYMPHOCYTES RELATIVE PERCENT: 18 % (ref 20–42)
MCH RBC QN AUTO: 27.4 PG (ref 26–35)
MCHC RBC AUTO-ENTMCNC: 31.3 G/DL (ref 32–34.5)
MCV RBC AUTO: 87.8 FL (ref 80–99.9)
MONOCYTES NFR BLD: 1.14 K/UL (ref 0.1–0.95)
MONOCYTES NFR BLD: 8 % (ref 2–12)
NEUTROPHILS NFR BLD: 71 % (ref 43–80)
NEUTS SEG NFR BLD: 9.74 K/UL (ref 1.8–7.3)
PLATELET # BLD AUTO: 417 K/UL (ref 130–450)
PMV BLD AUTO: 9.2 FL (ref 7–12)
POTASSIUM SERPL-SCNC: 3.9 MMOL/L (ref 3.5–5)
PROT SERPL-MCNC: 7.3 G/DL (ref 6.4–8.3)
RBC # BLD AUTO: 4.01 M/UL (ref 3.5–5.5)
RBC # BLD: ABNORMAL 10*6/UL
RBC # BLD: ABNORMAL 10*6/UL
SODIUM SERPL-SCNC: 139 MMOL/L (ref 132–146)
WBC OTHER # BLD: 13.7 K/UL (ref 4.5–11.5)

## 2025-01-06 PROCEDURE — 3700000001 HC ADD 15 MINUTES (ANESTHESIA): Performed by: SURGERY

## 2025-01-06 PROCEDURE — 2720000010 HC SURG SUPPLY STERILE: Performed by: SURGERY

## 2025-01-06 PROCEDURE — 85025 COMPLETE CBC W/AUTO DIFF WBC: CPT

## 2025-01-06 PROCEDURE — 2060000000 HC ICU INTERMEDIATE R&B

## 2025-01-06 PROCEDURE — 94640 AIRWAY INHALATION TREATMENT: CPT

## 2025-01-06 PROCEDURE — 3E033XZ INTRODUCTION OF VASOPRESSOR INTO PERIPHERAL VEIN, PERCUTANEOUS APPROACH: ICD-10-PCS | Performed by: SURGERY

## 2025-01-06 PROCEDURE — 6360000002 HC RX W HCPCS: Performed by: SURGERY

## 2025-01-06 PROCEDURE — 6360000002 HC RX W HCPCS: Performed by: NURSE ANESTHETIST, CERTIFIED REGISTERED

## 2025-01-06 PROCEDURE — 99232 SBSQ HOSP IP/OBS MODERATE 35: CPT | Performed by: FAMILY MEDICINE

## 2025-01-06 PROCEDURE — 2700000000 HC OXYGEN THERAPY PER DAY

## 2025-01-06 PROCEDURE — 3700000000 HC ANESTHESIA ATTENDED CARE: Performed by: SURGERY

## 2025-01-06 PROCEDURE — 36415 COLL VENOUS BLD VENIPUNCTURE: CPT

## 2025-01-06 PROCEDURE — 3E0H8GC INTRODUCTION OF OTHER THERAPEUTIC SUBSTANCE INTO LOWER GI, VIA NATURAL OR ARTIFICIAL OPENING ENDOSCOPIC: ICD-10-PCS | Performed by: SURGERY

## 2025-01-06 PROCEDURE — 6360000002 HC RX W HCPCS

## 2025-01-06 PROCEDURE — 6370000000 HC RX 637 (ALT 250 FOR IP)

## 2025-01-06 PROCEDURE — 6370000000 HC RX 637 (ALT 250 FOR IP): Performed by: STUDENT IN AN ORGANIZED HEALTH CARE EDUCATION/TRAINING PROGRAM

## 2025-01-06 PROCEDURE — 2709999900 HC NON-CHARGEABLE SUPPLY: Performed by: SURGERY

## 2025-01-06 PROCEDURE — 2500000003 HC RX 250 WO HCPCS

## 2025-01-06 PROCEDURE — 7100000011 HC PHASE II RECOVERY - ADDTL 15 MIN: Performed by: SURGERY

## 2025-01-06 PROCEDURE — 3609009900 HC COLONOSCOPY W/CONTROL BLEEDING ANY METHOD: Performed by: SURGERY

## 2025-01-06 PROCEDURE — 80053 COMPREHEN METABOLIC PANEL: CPT

## 2025-01-06 PROCEDURE — 7100000010 HC PHASE II RECOVERY - FIRST 15 MIN: Performed by: SURGERY

## 2025-01-06 PROCEDURE — 6370000000 HC RX 637 (ALT 250 FOR IP): Performed by: SURGERY

## 2025-01-06 PROCEDURE — 2580000003 HC RX 258: Performed by: NURSE ANESTHETIST, CERTIFIED REGISTERED

## 2025-01-06 RX ORDER — PROPOFOL 10 MG/ML
INJECTION, EMULSION INTRAVENOUS
Status: DISCONTINUED | OUTPATIENT
Start: 2025-01-06 | End: 2025-01-06 | Stop reason: SDUPTHER

## 2025-01-06 RX ORDER — SODIUM CHLORIDE 9 MG/ML
INJECTION, SOLUTION INTRAVENOUS
Status: DISCONTINUED | OUTPATIENT
Start: 2025-01-06 | End: 2025-01-06 | Stop reason: SDUPTHER

## 2025-01-06 RX ORDER — ONDANSETRON 2 MG/ML
INJECTION INTRAMUSCULAR; INTRAVENOUS
Status: DISCONTINUED | OUTPATIENT
Start: 2025-01-06 | End: 2025-01-06 | Stop reason: SDUPTHER

## 2025-01-06 RX ORDER — EPINEPHRINE 1 MG/ML(1)
AMPUL (ML) INJECTION PRN
Status: DISCONTINUED | OUTPATIENT
Start: 2025-01-06 | End: 2025-01-06 | Stop reason: ALTCHOICE

## 2025-01-06 RX ADMIN — SODIUM CHLORIDE, PRESERVATIVE FREE 10 ML: 5 INJECTION INTRAVENOUS at 20:42

## 2025-01-06 RX ADMIN — LISINOPRIL 5 MG: 5 TABLET ORAL at 11:08

## 2025-01-06 RX ADMIN — SODIUM CHLORIDE, PRESERVATIVE FREE 10 ML: 5 INJECTION INTRAVENOUS at 11:09

## 2025-01-06 RX ADMIN — PANTOPRAZOLE SODIUM 40 MG: 40 TABLET, DELAYED RELEASE ORAL at 11:08

## 2025-01-06 RX ADMIN — PREDNISONE 20 MG: 20 TABLET ORAL at 11:08

## 2025-01-06 RX ADMIN — PREDNISONE 20 MG: 20 TABLET ORAL at 20:42

## 2025-01-06 RX ADMIN — IPRATROPIUM BROMIDE AND ALBUTEROL SULFATE 1 DOSE: 2.5; .5 SOLUTION RESPIRATORY (INHALATION) at 12:50

## 2025-01-06 RX ADMIN — ARFORMOTEROL TARTRATE 15 MCG: 15 SOLUTION RESPIRATORY (INHALATION) at 20:37

## 2025-01-06 RX ADMIN — ONDANSETRON 4 MG: 2 INJECTION, SOLUTION INTRAMUSCULAR; INTRAVENOUS at 09:29

## 2025-01-06 RX ADMIN — PROPOFOL 320 MG: 10 INJECTION, EMULSION INTRAVENOUS at 09:15

## 2025-01-06 RX ADMIN — BUDESONIDE INHALATION 500 MCG: 0.5 SUSPENSION RESPIRATORY (INHALATION) at 20:37

## 2025-01-06 RX ADMIN — SODIUM CHLORIDE: 9 INJECTION, SOLUTION INTRAVENOUS at 09:11

## 2025-01-06 RX ADMIN — IPRATROPIUM BROMIDE AND ALBUTEROL SULFATE 1 DOSE: 2.5; .5 SOLUTION RESPIRATORY (INHALATION) at 20:37

## 2025-01-06 RX ADMIN — MONTELUKAST 10 MG: 10 TABLET, FILM COATED ORAL at 20:42

## 2025-01-06 RX ADMIN — IPRATROPIUM BROMIDE AND ALBUTEROL SULFATE 1 DOSE: 2.5; .5 SOLUTION RESPIRATORY (INHALATION) at 15:56

## 2025-01-06 ASSESSMENT — PAIN SCALES - GENERAL
PAINLEVEL_OUTOF10: 0

## 2025-01-06 ASSESSMENT — LIFESTYLE VARIABLES: SMOKING_STATUS: 1

## 2025-01-06 ASSESSMENT — PAIN SCALES - WONG BAKER
WONGBAKER_NUMERICALRESPONSE: NO HURT
WONGBAKER_NUMERICALRESPONSE: NO HURT

## 2025-01-06 NOTE — ANESTHESIA POSTPROCEDURE EVALUATION
Department of Anesthesiology  Postprocedure Note    Patient: Tuyet Petersen  MRN: [id number]  YOB: 1960  Date of evaluation: 1/6/2025    Procedure Summary       Date: 01/06/25 Room / Location: [address]    Anesthesia Start: 0911 Anesthesia Stop: 0947    Procedures:       COLONOSCOPY CONTROL HEMORRHAGE      COLONOSCOPY SUBMUCOSAL INJECTION Diagnosis:       COPD exacerbation (HCC)      (COPD exacerbation (HCC) [J44.1])    Surgeons: Gabriela Henry MD Responsible Provider: Patricia Ambrosio DO    Anesthesia Type: MAC ASA Status: 3            Anesthesia Type: MAC    Jake Phase I:      Jake Phase II: Jake Score: 9    Anesthesia Post Evaluation    Patient location during evaluation: PACU  Patient participation: complete - patient participated  Level of consciousness: awake and alert  Nausea & Vomiting: no vomiting and no nausea  Cardiovascular status: hemodynamically stable  Respiratory status: acceptable and spontaneous ventilation  Hydration status: stable  Pain management: adequate    No notable events documented.

## 2025-01-06 NOTE — ANESTHESIA PRE PROCEDURE
Department of Anesthesiology  Preprocedure Note       Name:  Tuyet Petersen   Age:  64 y.o.  :  1960                                          MRN:  [id number]         Date:  2025      Surgeon: Surgeon(s):  Gabriela Henry MD    Procedure: Procedure(s):  COLONOSCOPY DIAGNOSTIC    Medications prior to admission:   Prior to Admission medications    Medication Sig Start Date End Date Taking? Authorizing Provider   lisinopril (PRINIVIL;ZESTRIL) 5 MG tablet TAKE ONE TABLET BY MOUTH EVERY DAY 24  Yes Elvis Ren MD   omeprazole (PRILOSEC) 20 MG delayed release capsule TAKE ONE CAPSULE BY MOUTH EVERY DAY 24  Yes Elvis Ren MD   ondansetron (ZOFRAN) 4 MG tablet Take 1 tablet by mouth 3 times daily as needed for Nausea or Vomiting 24  Yes Gabriela Henry MD   tiotropium (SPIRIVA HANDIHALER) 18 MCG inhalation capsule Inhale 1 capsule into the lungs daily 24  Yes Elvis Ren MD   acetaminophen (TYLENOL) 325 MG tablet Take 2 tablets by mouth every 6 hours as needed for Pain   Yes Provider, MD Augustus   dicyclomine (BENTYL) 20 MG tablet Take 1 tablet by mouth 4 times daily 8/12/24 11/10/24  Gabriela Henry MD   montelukast (SINGULAIR) 10 MG tablet Take 1 tablet by mouth nightly  Patient not taking: Reported on 2024   Elvis Ren MD       Current medications:    Current Facility-Administered Medications   Medication Dose Route Frequency Provider Last Rate Last Admin    promethazine (PHENERGAN) injection 6.25 mg  6.25 mg IntraMUSCular Q6H PRN Gabriela Henry MD        pantoprazole (PROTONIX) tablet 40 mg  40 mg Oral QAM AC Gabriela Henry MD        predniSONE (DELTASONE) tablet 20 mg  20 mg Oral BID Vladimir Johnson DO        guaiFENesin-dextromethorphan (ROBITUSSIN DM) 100-10 MG/5ML syrup 5 mL  5 mL Oral Q4H PRN Camila Hilario MD   5 mL at 250    lisinopril

## 2025-01-07 VITALS
WEIGHT: 142 LBS | RESPIRATION RATE: 18 BRPM | HEART RATE: 86 BPM | HEIGHT: 60 IN | TEMPERATURE: 98.1 F | SYSTOLIC BLOOD PRESSURE: 132 MMHG | OXYGEN SATURATION: 89 % | BODY MASS INDEX: 27.88 KG/M2 | DIASTOLIC BLOOD PRESSURE: 65 MMHG

## 2025-01-07 PROBLEM — J44.1 COPD EXACERBATION (HCC): Status: RESOLVED | Noted: 2024-12-31 | Resolved: 2025-01-07

## 2025-01-07 PROBLEM — K92.1 MELANOTIC STOOLS: Status: RESOLVED | Noted: 2025-01-01 | Resolved: 2025-01-07

## 2025-01-07 PROBLEM — J96.01 ACUTE HYPOXIC RESPIRATORY FAILURE: Status: RESOLVED | Noted: 2025-01-01 | Resolved: 2025-01-07

## 2025-01-07 PROBLEM — B33.8 RSV (RESPIRATORY SYNCYTIAL VIRUS INFECTION): Status: RESOLVED | Noted: 2024-12-31 | Resolved: 2025-01-07

## 2025-01-07 PROBLEM — B33.8 RESPIRATORY SYNCYTIAL VIRUS (RSV): Status: RESOLVED | Noted: 2025-01-01 | Resolved: 2025-01-07

## 2025-01-07 LAB
ALBUMIN SERPL-MCNC: 3.5 G/DL (ref 3.5–5.2)
ALP SERPL-CCNC: 67 U/L (ref 35–104)
ALT SERPL-CCNC: 26 U/L (ref 0–32)
ANION GAP SERPL CALCULATED.3IONS-SCNC: 8 MMOL/L (ref 7–16)
AST SERPL-CCNC: 14 U/L (ref 0–31)
BASOPHILS # BLD: 0 K/UL (ref 0–0.2)
BASOPHILS NFR BLD: 0 % (ref 0–2)
BILIRUB SERPL-MCNC: 0.2 MG/DL (ref 0–1.2)
BUN SERPL-MCNC: 25 MG/DL (ref 6–23)
CALCIUM SERPL-MCNC: 8.5 MG/DL (ref 8.6–10.2)
CHLORIDE SERPL-SCNC: 101 MMOL/L (ref 98–107)
CO2 SERPL-SCNC: 24 MMOL/L (ref 22–29)
CREAT SERPL-MCNC: 1.1 MG/DL (ref 0.5–1)
EOSINOPHIL # BLD: 0 K/UL (ref 0.05–0.5)
EOSINOPHILS RELATIVE PERCENT: 0 % (ref 0–6)
ERYTHROCYTE [DISTWIDTH] IN BLOOD BY AUTOMATED COUNT: 20.2 % (ref 11.5–15)
GFR, ESTIMATED: 59 ML/MIN/1.73M2
GLUCOSE SERPL-MCNC: 129 MG/DL (ref 74–99)
HCT VFR BLD AUTO: 29 % (ref 34–48)
HGB BLD-MCNC: 9.4 G/DL (ref 11.5–15.5)
IMM GRANULOCYTES # BLD AUTO: 0.23 K/UL (ref 0–0.58)
IMM GRANULOCYTES NFR BLD: 3 % (ref 0–5)
LYMPHOCYTES NFR BLD: 0.81 K/UL (ref 1.5–4)
LYMPHOCYTES RELATIVE PERCENT: 10 % (ref 20–42)
MCH RBC QN AUTO: 28.1 PG (ref 26–35)
MCHC RBC AUTO-ENTMCNC: 32.4 G/DL (ref 32–34.5)
MCV RBC AUTO: 86.8 FL (ref 80–99.9)
MONOCYTES NFR BLD: 0.37 K/UL (ref 0.1–0.95)
MONOCYTES NFR BLD: 5 % (ref 2–12)
NEUTROPHILS NFR BLD: 82 % (ref 43–80)
NEUTS SEG NFR BLD: 6.55 K/UL (ref 1.8–7.3)
PLATELET # BLD AUTO: 319 K/UL (ref 130–450)
PMV BLD AUTO: 8.9 FL (ref 7–12)
POTASSIUM SERPL-SCNC: 4.5 MMOL/L (ref 3.5–5)
PROT SERPL-MCNC: 6.1 G/DL (ref 6.4–8.3)
RBC # BLD AUTO: 3.34 M/UL (ref 3.5–5.5)
RBC # BLD: ABNORMAL 10*6/UL
SODIUM SERPL-SCNC: 133 MMOL/L (ref 132–146)
WBC OTHER # BLD: 8 K/UL (ref 4.5–11.5)

## 2025-01-07 PROCEDURE — 99238 HOSP IP/OBS DSCHRG MGMT 30/<: CPT | Performed by: FAMILY MEDICINE

## 2025-01-07 PROCEDURE — 6360000002 HC RX W HCPCS

## 2025-01-07 PROCEDURE — 2500000003 HC RX 250 WO HCPCS

## 2025-01-07 PROCEDURE — 6370000000 HC RX 637 (ALT 250 FOR IP)

## 2025-01-07 PROCEDURE — 6370000000 HC RX 637 (ALT 250 FOR IP): Performed by: SURGERY

## 2025-01-07 PROCEDURE — 6370000000 HC RX 637 (ALT 250 FOR IP): Performed by: STUDENT IN AN ORGANIZED HEALTH CARE EDUCATION/TRAINING PROGRAM

## 2025-01-07 PROCEDURE — 80053 COMPREHEN METABOLIC PANEL: CPT

## 2025-01-07 PROCEDURE — 85025 COMPLETE CBC W/AUTO DIFF WBC: CPT

## 2025-01-07 PROCEDURE — 94640 AIRWAY INHALATION TREATMENT: CPT

## 2025-01-07 RX ORDER — PREDNISONE 10 MG/1
TABLET ORAL
Qty: 6 TABLET | Refills: 0 | Status: SHIPPED | OUTPATIENT
Start: 2025-01-07 | End: 2025-01-11

## 2025-01-07 RX ORDER — IPRATROPIUM BROMIDE AND ALBUTEROL SULFATE 2.5; .5 MG/3ML; MG/3ML
3 SOLUTION RESPIRATORY (INHALATION)
Qty: 360 ML | Refills: 0 | Status: SHIPPED | OUTPATIENT
Start: 2025-01-07

## 2025-01-07 RX ADMIN — PREDNISONE 20 MG: 20 TABLET ORAL at 09:11

## 2025-01-07 RX ADMIN — PANTOPRAZOLE SODIUM 40 MG: 40 TABLET, DELAYED RELEASE ORAL at 05:54

## 2025-01-07 RX ADMIN — SODIUM CHLORIDE, PRESERVATIVE FREE 10 ML: 5 INJECTION INTRAVENOUS at 09:11

## 2025-01-07 RX ADMIN — IPRATROPIUM BROMIDE AND ALBUTEROL SULFATE 1 DOSE: 2.5; .5 SOLUTION RESPIRATORY (INHALATION) at 12:11

## 2025-01-07 RX ADMIN — IPRATROPIUM BROMIDE AND ALBUTEROL SULFATE 1 DOSE: 2.5; .5 SOLUTION RESPIRATORY (INHALATION) at 08:05

## 2025-01-07 RX ADMIN — ARFORMOTEROL TARTRATE 15 MCG: 15 SOLUTION RESPIRATORY (INHALATION) at 08:05

## 2025-01-07 RX ADMIN — BUDESONIDE INHALATION 500 MCG: 0.5 SUSPENSION RESPIRATORY (INHALATION) at 08:05

## 2025-01-07 RX ADMIN — LISINOPRIL 5 MG: 5 TABLET ORAL at 09:11

## 2025-01-07 NOTE — CARE COORDINATION
Social work / Discharge planning:         RN updated social work that patient will need oxygen for discharge.    Referral made to [facility].    Patient updated that she will need to wait until portable oxygen is delivered for discharge.   She states that her daughter will transport her home.   RN updated.   Electronically signed by ESTELA Mercado on 1/7/2025 at 11:30 AM

## 2025-01-07 NOTE — PLAN OF CARE
Problem: Discharge Planning  Goal: Discharge to home or other facility with appropriate resources  1/2/2025 1043 by Paige Kohli, RN  Outcome: Progressing  1/1/2025 2218 by Sarah Shoemaker, RN  Outcome: Progressing     Problem: Safety - Adult  Goal: Free from fall injury  1/2/2025 1043 by Paige Kohli, RN  Outcome: Progressing  1/1/2025 2218 by Sarah Shoemaker, RN  Outcome: Progressing     
  Problem: Discharge Planning  Goal: Discharge to home or other facility with appropriate resources  1/2/2025 2049 by Ascencion Garrett, RN  Outcome: Progressing  Flowsheets  Taken 1/2/2025 2042 by Ascencion Garrett, RN  Discharge to home or other facility with appropriate resources: Identify barriers to discharge with patient and caregiver  Taken 1/2/2025 1630 by Melania Noel, RN  Discharge to home or other facility with appropriate resources: Identify barriers to discharge with patient and caregiver  1/2/2025 1043 by Paige Kohli RN  Outcome: Progressing     Problem: Safety - Adult  Goal: Free from fall injury  1/2/2025 2049 by Ascencion Garrett RN  Outcome: Progressing  Flowsheets (Taken 1/2/2025 1630 by Melania Noel, RN)  Free From Fall Injury: Instruct family/caregiver on patient safety  1/2/2025 1043 by Paige Kohli RN  Outcome: Progressing     Problem: Pain  Goal: Verbalizes/displays adequate comfort level or baseline comfort level  Outcome: Progressing     
  Problem: Discharge Planning  Goal: Discharge to home or other facility with appropriate resources  1/6/2025 2209 by Raissa Huang, RN  Outcome: Progressing  Flowsheets (Taken 1/6/2025 2043)  Discharge to home or other facility with appropriate resources:   Identify barriers to discharge with patient and caregiver   Arrange for needed discharge resources and transportation as appropriate  1/6/2025 1249 by Kacey Slater, RN  Outcome: Progressing  Flowsheets (Taken 1/6/2025 1150)  Discharge to home or other facility with appropriate resources:   Identify barriers to discharge with patient and caregiver   Arrange for needed discharge resources and transportation as appropriate   Identify discharge learning needs (meds, wound care, etc)   Arrange for interpreters to assist at discharge as needed   Refer to discharge planning if patient needs post-hospital services based on physician order or complex needs related to functional status, cognitive ability or social support system     Problem: Safety - Adult  Goal: Free from fall injury  1/6/2025 2209 by Raissa Huang, RN  Outcome: Progressing  1/6/2025 1249 by Kacey Slater RN  Outcome: Progressing     Problem: Pain  Goal: Verbalizes/displays adequate comfort level or baseline comfort level  1/6/2025 2209 by Raissa Huang, RN  Outcome: Progressing  1/6/2025 1249 by Kacey Slater RN  Outcome: Progressing     Problem: Respiratory - Adult  Goal: Achieves optimal ventilation and oxygenation  Outcome: Progressing     Problem: Gastrointestinal - Adult  Goal: Minimal or absence of nausea and vomiting  Outcome: Progressing  Goal: Maintains or returns to baseline bowel function  Outcome: Progressing  Goal: Maintains adequate nutritional intake  Outcome: Progressing     Problem: Metabolic/Fluid and Electrolytes - Adult  Goal: Electrolytes maintained within normal limits  Outcome: Progressing  Goal: Hemodynamic stability and optimal renal function 
  Problem: Discharge Planning  Goal: Discharge to home or other facility with appropriate resources  Outcome: Progressing     Problem: Safety - Adult  Goal: Free from fall injury  Outcome: Progressing     
  Problem: Discharge Planning  Goal: Discharge to home or other facility with appropriate resources  Outcome: Progressing     Problem: Safety - Adult  Goal: Free from fall injury  Outcome: Progressing     
  Problem: Discharge Planning  Goal: Discharge to home or other facility with appropriate resources  Outcome: Progressing     Problem: Safety - Adult  Goal: Free from fall injury  Outcome: Progressing     Problem: Pain  Goal: Verbalizes/displays adequate comfort level or baseline comfort level  Outcome: Progressing     
  Problem: Discharge Planning  Goal: Discharge to home or other facility with appropriate resources  Outcome: Progressing     Problem: Safety - Adult  Goal: Free from fall injury  Outcome: Progressing     Problem: Pain  Goal: Verbalizes/displays adequate comfort level or baseline comfort level  Outcome: Progressing     
  Problem: Discharge Planning  Goal: Discharge to home or other facility with appropriate resources  Outcome: Progressing     Problem: Safety - Adult  Goal: Free from fall injury  Outcome: Progressing  Flowsheets (Taken 1/3/2025 0730 by Linnea Salvador RN)  Free From Fall Injury:   Instruct family/caregiver on patient safety   Based on caregiver fall risk screen, instruct family/caregiver to ask for assistance with transferring infant if caregiver noted to have fall risk factors     Problem: Pain  Goal: Verbalizes/displays adequate comfort level or baseline comfort level  Outcome: Progressing  Flowsheets  Taken 1/3/2025 1445 by Linnea Salvadro RN  Verbalizes/displays adequate comfort level or baseline comfort level:   Assess pain using appropriate pain scale   Encourage patient to monitor pain and request assistance  Taken 1/3/2025 1234 by Linnea Salvador RN  Verbalizes/displays adequate comfort level or baseline comfort level:   Assess pain using appropriate pain scale   Encourage patient to monitor pain and request assistance  Taken 1/3/2025 0748 by Linnea Salvador RN  Verbalizes/displays adequate comfort level or baseline comfort level:   Assess pain using appropriate pain scale   Encourage patient to monitor pain and request assistance     
RN  Outcome: Adequate for Discharge  1/6/2025 2213 by Raissa Huang RN  Outcome: Progressing  1/6/2025 2209 by Raissa Huang RN  Outcome: Progressing     Problem: Metabolic/Fluid and Electrolytes - Adult  Goal: Electrolytes maintained within normal limits  1/7/2025 1129 by Hawa Melgoza RN  Outcome: Adequate for Discharge  1/6/2025 2213 by Raissa Huang RN  Outcome: Progressing  1/6/2025 2209 by Raissa Huang RN  Outcome: Progressing  Goal: Hemodynamic stability and optimal renal function maintained  1/7/2025 1129 by Hawa Melgoza RN  Outcome: Adequate for Discharge  1/6/2025 2213 by Raissa Huang RN  Outcome: Progressing  1/6/2025 2209 by Raissa Huang RN  Outcome: Progressing     Problem: Hematologic - Adult  Goal: Maintains hematologic stability  1/7/2025 1129 by Hawa Melgoza RN  Outcome: Adequate for Discharge  1/6/2025 2213 by Raissa Huang RN  Outcome: Progressing  1/6/2025 2209 by Raissa Huang RN  Outcome: Progressing     Problem: Nutrition Deficit:  Goal: Optimize nutritional status  Outcome: Adequate for Discharge

## 2025-01-07 NOTE — DISCHARGE SUMMARY
[facility]  Discharge Summary      Patient ID:  Tuyet Petersen  [id number]  64 y.o.  1960    Admit date: 12/31/2024    Discharge date and time: 1/7/2025    Location of discharge: [facility]     Admitting Physician: Titus Blas MD     Discharge Physician: Madhuri Simons MD    Consults: general surgery    Admission Diagnoses: Respiratory syncytial virus (RSV) [B33.8]  COPD exacerbation (HCC) [J44.1]  Acute hypoxic respiratory failure [J96.01]    Discharge Diagnoses: Principal Problem (Resolved):    COPD exacerbation (HCC)  Active Problems:    History of breast cancer    Essential hypertension    Gastroesophageal reflux disease    Anemia, unspecified    Chronic obstructive pulmonary disease, unspecified COPD type (HCC)    Irritable bowel syndrome with diarrhea  Resolved Problems:    RSV (respiratory syncytial virus infection)    Melanotic stools    Respiratory syncytial virus (RSV)    Acute hypoxic respiratory failure      Hospital Course:   Tuyet Petersen  is a 64 y.o. female patient of Elvis Ren MD  with a pertinent PMHx of COPD, right breast cancer status post mastectomy, chemo, and radiation [id number], iron def anemia, hypertension, hyperlipidemia who presented to the ER from home with chief complaint of Shortness of breath.     in the ED patient was noted to have an oxygen saturation of 84% while ambulating at [facility], she was placed on 3 L of oxygen and this improved to 98%, then increased to 4L with maintained saturation.  She was otherwise vitally stable.  EKG with normal sinus rhythm, possible left atrial enlargement, possible old septal infarct and possible old inferior infarct.  Chest x-ray with no acute process, mild cardiomegaly.  CBC largely unremarkable.  BNP of 91.  Troponin of 6. CMP largely unremarkable. RFA positive for RSV. CTA Pulm with no pulmonary emboli, small hiatal hernia, bilateral nodular densities which

## 2025-01-08 ENCOUNTER — CARE COORDINATION (OUTPATIENT)
Dept: CARE COORDINATION | Age: 65
End: 2025-01-08

## 2025-01-08 NOTE — CARE COORDINATION
Care Transitions Note    Initial Call - Call within 2 business days of discharge: Yes    Outreach Attempts:   1st attempt to reach the patient for initial Care Transition call post hospital discharge. HIPAA compliant message left with CTN's contact information requesting return phone call.     Will attempt outreach again.    Patient: Tuyet Petersen    Patient : 1960   MRN: [id number]    Reason for Admission: COPD exacerbation  Discharge Date: 25  RURS: Readmission Risk Score: 10.5    Last Discharge Facility       Date Complaint Diagnosis Description Type Department Provider    24 Shortness of Breath COPD exacerbation (HCC) ... ED to Hosp-Admission (Discharged) (ADMITTED) Madhuri Duenas MD; Andrea Hernandez...            Was this an external facility discharge? No    Follow Up Appointment:   Patient has hospital follow up appointment scheduled within 7 days of discharge.  Will route message to office staff requesting they update OV type to a HFU OV type.  Future Appointments         Provider Specialty Dept Phone    2025 9:20 AM Elvis Ren MD Family Medicine [phone]    2025 9:00 AM [facility] Rad[facility] [phone]    2025 9:30 AM Michelle Kam APRN - CNP Breast [facility] / [facility] [phone]            Plan for follow-up on next business day.      Codi Coyne RN

## 2025-01-08 NOTE — PROGRESS NOTES
[facility]  Progress Note    Chief complaint :  Chief Complaint   Patient presents with    Shortness of Breath     Pt having increased SOB 84% on ra at pivot        Subjective:    Early this morning patient had a moderate to large amount of maroon stool with clots when she used the toilet.  Following this she felt very anxious and felt that she was more fatigued.  She let nursing staff know who then Perfect Served me, and so I saw her at bedside.     Patient describes feeling worse. Patient denies chest pain, vomiting, fever, chills, changes in urination. Patient is tolerating diet but with some decreased appetite. Long conversation had regarding history of AVMs, potential GIB. Patient would be agreeable for general surgery to come see her as we await repeat H&H, she is unsure if she would want another colonoscopy though.     Past medical, surgical, family and social history were reviewed, non-contributory, and unchanged unless otherwise stated.    Review of Systems   All other systems reviewed and are negative.      Objective:  BP (!) 154/83   Pulse (!) 114   Temp 97.3 °F (36.3 °C) (Oral)   Resp 16   Ht 1.524 m (5')   Wt 64.4 kg (142 lb)   SpO2 98%   BMI 27.73 kg/m²     Physical Exam  Vitals and nursing note reviewed.   Constitutional:       General: She is in acute distress (anxious).      Appearance: Normal appearance. She is not ill-appearing.      Comments: Wearing 4 L NC   HENT:      Head: Normocephalic and atraumatic.      Mouth/Throat:      Mouth: Mucous membranes are moist.      Pharynx: Oropharynx is clear. Posterior oropharyngeal erythema present. No oropharyngeal exudate.   Eyes:      General:         Right eye: No discharge.         Left eye: No discharge.      Conjunctiva/sclera: Conjunctivae normal.   Cardiovascular:      Rate and Rhythm: Regular rhythm. Tachycardia present.      Heart sounds: Normal heart sounds. No murmur heard.  Pulmonary:      Effort: 
    Surgery History and Physical/Consult Note    CC:    Melena    HPI:  This is a 64 y.o. female with PMH below admitted 12/31/2024 with shortness of breath and respiratory and she was diagnosed with COPD exacerbation and RSV.  Overnight she had a large volume melanotic stool she states.  She has a history of peptic ulcer disease and AVM noted in the cecum on endoscopy last year.  She is okay with upper endoscopy she is hesitant for colonoscopy at this time.      PMH:  Past Medical History:   Diagnosis Date    Anemia     follows with [facility]    Arthritis     Blood in stool     Breast cancer (HCC) 2015    right breast    Cancer (HCC) 2015    breast right, treated with surgery and radiation and po medication    Dry eye syndrome     GERD (gastroesophageal reflux disease)     Hyperlipidemia     Hypertension     Osteoporosis     PONV (postoperative nausea and vomiting)        PSH:  Past Surgical History:   Procedure Laterality Date    APPENDECTOMY      BREAST SURGERY      CARDIOVASCULAR STRESS TEST      CHOLECYSTECTOMY  1984    COLONOSCOPY  09/17/2018    COLONOSCOPY N/A 09/17/2018    COLONOSCOPY POLYPECTOMY SNARE/COLD BIOPSY performed by Gabriela Henry MD at [facility] ENDOSCOPY    COLONOSCOPY N/A 11/06/2020    COLONOSCOPY DIAGNOSTIC performed by Gabriela Henry MD at [facility] ENDOSCOPY    COLONOSCOPY N/A 05/17/2023    COLONOSCOPY DIAGNOSTIC performed by Gabriela Henry MD at [facility] ENDOSCOPY    ENDOSCOPY, COLON, DIAGNOSTIC      HERNIA REPAIR  2009,2010    abdominal    HYSTERECTOMY, TOTAL ABDOMINAL (CERVIX REMOVED) N/A 2009    MASTECTOMY Right 2015    simple, blue dye, with right axillary sentinel lymph node excision    UPPER GASTROINTESTINAL ENDOSCOPY  04/03/2017    UPPER GASTROINTESTINAL ENDOSCOPY N/A 11/06/2020    EGD BIOPSY performed by Gabriela Henry MD at [facility] ENDOSCOPY    UPPER GASTROINTESTINAL ENDOSCOPY N/A 05/17/2023    EGD BIOPSY performed by Gabriela Henry MD at [facility] 
4 Eyes Skin Assessment     NAME:  Tuyet Petersen  YOB: 1960  MEDICAL RECORD NUMBER:  [id number]    The patient is being assessed for  Admission    I agree that at least one RN has performed a thorough Head to Toe Skin Assessment on the patient. ALL assessment sites listed below have been assessed.      Areas assessed by both nurses:    Head, Face, Ears, Shoulders, Back, Chest, Arms, Elbows, Hands, Sacrum. Buttock, Coccyx, Ischium, Legs. Feet and Heels, and Under Medical Devices         Does the Patient have a Wound? No noted wound(s)       Rojas Prevention initiated by RN: No  Wound Care Orders initiated by RN: No    Pressure Injury (Stage 3,4, Unstageable, DTI, NWPT, and Complex wounds) if present, place Wound referral order by RN under : No    New Ostomies, if present place, Ostomy referral order under : No     Nurse 1 eSignature: Electronically signed by Omer Rocha RN on 12/31/24 at 10:40 PM EST    **SHARE this note so that the co-signing nurse can place an eSignature**    Nurse 2 eSignature: Electronically signed by TRINIDAD HIGHTOWER RN on 12/31/24 at 10:40 PM EST  
ABD soft with hypoactive bowel sounds.  
Ambulating pulse ox completed with pt.    Pt 92% on RA at rest.  During ambulation, pt dropped to 86% on RA. Pt placed on 2L and quickly recovered to 91%. Pt 91% on RA when back in bed.    Electronically signed by Hawa Melgoza RN on 1/7/2025 at 10:35 AM   
CLINICAL PHARMACY NOTE: MEDS TO BEDS    Total # of Prescriptions Filled: 2     The following medications were delivered to the patient:  Prednisone 10 mg   Duo-neb    Additional Documentation:    
Called report to RN   
Comprehensive Nutrition Assessment    Type and Reason for Visit:  Initial, LOS    Nutrition Recommendations/Plan:   Continue current diet  Start HC/HP ONS BID  Will continue to monitor while inpatient     Malnutrition Assessment:  Malnutrition Status:  At risk for malnutrition (01/07/25 0973)    Context:  Chronic Illness     Findings of the 6 clinical characteristics of malnutrition:  Energy Intake:  Mild decrease in energy intake  Weight Loss:  Unable to assess (UTO CBW)     Body Fat Loss:  Unable to assess (d/t droplet iso)     Muscle Mass Loss:  Unable to assess    Fluid Accumulation:  No fluid accumulation     Strength:  Not Performed    Nutrition Assessment:    Pt w/ COPD exac; RSV; melena. Hx breast CA, COPD. S/p EGD 1/2- moderate gastritis. S/p colonoscopy 1/6- cecal AVM. Pt consuming variable amounts of meals (%). Continue current diet. Will add HC/HP ONS BID to promote protein/energy intake. Will continue to monitor.    Nutrition Related Findings:    A&O x4, abd soft/rounded/nontender, +BS, no edema, +1.2 L, steroid Wound Type: None       Current Nutrition Intake & Therapies:    Average Meal Intake: 26-50%, 51-75%, %  Average Supplements Intake: None Ordered  ADULT DIET; Regular  ADULT ORAL NUTRITION SUPPLEMENT; Breakfast, Dinner; Standard High Calorie/High Protein Oral Supplement    Anthropometric Measures:  Height: 152.4 cm (5')  Ideal Body Weight (IBW): 100 lbs (45 kg)       Current Body Weight: 64.4 kg (142 lb) (12/31- stated. UTO CBW d/t iso), 142 % IBW. Weight Source: Stated  Current BMI (kg/m2): 27.7  Usual Body Weight: 65.1 kg (143 lb 10 oz) (8/12/24)     % Weight Change (Calculated): -1.1  Weight Adjustment For: No Adjustment     BMI Categories: Overweight (BMI 25.0-29.9)    Estimated Daily Nutrient Needs:  Energy Requirements Based On: Formula  Weight Used for Energy Requirements: Usual  Energy (kcal/day): 9384-1707  Weight Used for Protein Requirements: Ideal  Protein (g/day): 
Dr. Davila notified via Dormzy regarding diet. New orders received to continue clear liquid diet.   
Dr. Johnson notified via Nativo regarding morning labs.   
GENERAL SURGERY  DAILY PROGRESS NOTE    Patient's Name/Date of Birth: Tuyet Petersen / 1960    Date: 2025     Chief Complaint   Patient presents with    Shortness of Breath     Pt having increased SOB 84% on ra at pivot         Subjective:  Doing well. Would like a regular diet again. No abdominal pain or further signs of bleeding from rectum.      Objective:  Last 24Hrs  Temp  Av.7 °F (36.5 °C)  Min: 97.5 °F (36.4 °C)  Max: 98 °F (36.7 °C)  Resp  Av.5  Min: 15  Max: 18  Pulse  Av.7  Min: 70  Max: 90  Systolic (24hrs), Av , Min:104 , Max:150     Diastolic (24hrs), Av, Min:57, Max:83    SpO2  Av.5 %  Min: 90 %  Max: 100 %    I/O last 3 completed shifts:  In: 200 [I.V.:200]  Out: -       General: In no acute distress, resting in bed  Cardiovascular: Warm throughout, no edema  Respiratory: no respiratory distress, equal chest rise  Abdomen: soft,  nontender, nondistended  Skin: no obvious rashes or lesions appreciated, no jaundice  Extremities: moving all extremities      CBC  Recent Labs     25  0504 25  1105 25  0316   WBC 9.5 13.7* 8.0   RBC 3.36* 4.01 3.34*   HGB 9.3* 11.0* 9.4*   HCT 29.0* 35.2 29.0*   MCV 86.3 87.8 86.8   MCH 27.7 27.4 28.1   MCHC 32.1 31.3* 32.4   RDW 19.9* 20.4* 20.2*    417 319   MPV 9.1 9.2 8.9       CMP  Recent Labs     25  0504 25  1105 25  0316    139 133   K 4.9 3.9 4.5    103 101   CO2 24 28 24   BUN 27* 29* 25*   CREATININE 0.9 1.2* 1.1*   GLUCOSE 127* 98 129*   CALCIUM 9.0 9.3 8.5*   BILITOT <0.2 0.2 0.2   ALKPHOS 70 80 67   AST 15 23 14   ALT 17 40* 26         Assessment/Plan:    Patient Active Problem List   Diagnosis    Chronic otitis externa of right ear    Essential hypertension    Gastroesophageal reflux disease    Osteoporosis    Mild episode of recurrent major depressive disorder (HCC)    History of breast cancer    Anemia, unspecified    AVM (arteriovenous malformation) of 
GENERAL SURGERY  DAILY PROGRESS NOTE    Patient's Name/Date of Birth: Tuyet Petersen / 1960    Date: 2025     Chief Complaint   Patient presents with    Shortness of Breath     Pt having increased SOB 84% on ra at pivot         Subjective:  No further bleeding. Patient said she wants to proceed with colonoscopy.    Objective:  Last 24Hrs  Temp  Av °F (36.7 °C)  Min: 97.5 °F (36.4 °C)  Max: 98.5 °F (36.9 °C)  Resp  Av.7  Min: 18  Max: 20  Pulse  Av.7  Min: 87  Max: 98  Systolic (24hrs), Av , Min:109 , Max:149     Diastolic (24hrs), Av, Min:50, Max:68    SpO2  Av.1 %  Min: 92 %  Max: 97 %    I/O last 3 completed shifts:  In: 360 [P.O.:360]  Out: -       General: resting in bed  Cardiovascular: Warm throughout, no edema  Respiratory: no respiratory distress, equal chest rise  Abdomen: soft,  nontender, nondistended  Skin: no obvious rashes or lesions appreciated, no jaundice  Extremities:moving all extremities      CBC  Recent Labs     25  0654 25  0427 25  0521   WBC 16.2* 10.5 9.2   RBC 3.83 3.40* 3.33*   HGB 10.4* 9.3* 9.3*   HCT 32.7* 29.8* 28.9*   MCV 85.4 87.6 86.8   MCH 27.2 27.4 27.9   MCHC 31.8* 31.2* 32.2   RDW 20.0* 20.1* 20.0*    258 269   MPV 9.3 9.4 9.3       CMP  Recent Labs     25  0654 25  1019 25  0427 25  0521    136 135 135   K 5.2* 4.6 4.7 4.5    103 103 104   CO2 23 22 20* 23   BUN 33* 35* 29* 28*   CREATININE 1.1* 1.1* 1.0 0.9   GLUCOSE 111* 130* 93 110*   CALCIUM 9.1 9.4 9.1 9.1   BILITOT 0.2  --  0.2 <0.2   ALKPHOS 77  --  75 70   AST 17  --  16 13   ALT 14  --  12 13         Assessment/Plan:    Patient Active Problem List   Diagnosis    Chronic otitis externa of right ear    Essential hypertension    Gastroesophageal reflux disease    Osteoporosis    Mild episode of recurrent major depressive disorder (HCC)    History of breast cancer    Anemia, unspecified    AVM (arteriovenous 
GENERAL SURGERY  DAILY PROGRESS NOTE    Patient's Name/Date of Birth: Tuyet Petersen / 1960    Date: 2025     Chief Complaint   Patient presents with    Shortness of Breath     Pt having increased SOB 84% on ra at pivot         Subjective:  Passed some clots overnight.    Objective:  Last 24Hrs  Temp  Av.3 °F (36.8 °C)  Min: 97.7 °F (36.5 °C)  Max: 98.7 °F (37.1 °C)  Resp  Av.3  Min: 17  Max: 18  Pulse  Av.7  Min: 87  Max: 102  Systolic (24hrs), Av , Min:93 , Max:119     Diastolic (24hrs), Av, Min:49, Max:71    SpO2  Av.3 %  Min: 90 %  Max: 98 %    No intake/output data recorded.      General: resting in bed  Cardiovascular: Warm throughout, no edema  Respiratory: no respiratory distress, equal chest rise  Abdomen: soft,  nontender, nondistended  Skin: no obvious rashes or lesions appreciated, no jaundice  Extremities:moving all extremities      CBC  Recent Labs     25  0427 25  0521 25  0504   WBC 10.5 9.2 9.5   RBC 3.40* 3.33* 3.36*   HGB 9.3* 9.3* 9.3*   HCT 29.8* 28.9* 29.0*   MCV 87.6 86.8 86.3   MCH 27.4 27.9 27.7   MCHC 31.2* 32.2 32.1   RDW 20.1* 20.0* 19.9*    269 284   MPV 9.4 9.3 9.1       CMP  Recent Labs     25  0427 25  0521 25  0504    135 138   K 4.7 4.5 4.9    104 103   CO2 20* 23 24   BUN 29* 28* 27*   CREATININE 1.0 0.9 0.9   GLUCOSE 93 110* 127*   CALCIUM 9.1 9.1 9.0   BILITOT 0.2 <0.2 <0.2   ALKPHOS 75 70 70   AST 16 13 15   ALT 12 13 17         Assessment/Plan:    Patient Active Problem List   Diagnosis    Chronic otitis externa of right ear    Essential hypertension    Gastroesophageal reflux disease    Osteoporosis    Mild episode of recurrent major depressive disorder (HCC)    History of breast cancer    Anemia, unspecified    AVM (arteriovenous malformation) of colon    Chronic obstructive pulmonary disease, unspecified COPD type (HCC)    Irritable bowel syndrome with diarrhea    COPD 
GENERAL SURGERY  DAILY PROGRESS NOTE    Patient's Name/Date of Birth: Tuyet Petersen / 1960    Date: January 3, 2025     Chief Complaint   Patient presents with    Shortness of Breath     Pt having increased SOB 84% on ra at pivot         Subjective:  Having some small episodes of maroon bowel movements, states this is slowing down. States she has gotten sick from prep in the past.      Objective:  Last 24Hrs  Temp  Av.8 °F (36.6 °C)  Min: 97.5 °F (36.4 °C)  Max: 98 °F (36.7 °C)  Resp  Av.9  Min: 18  Max: 22  Pulse  Av.6  Min: 77  Max: 102  Systolic (24hrs), Av , Min:90 , Max:123     Diastolic (24hrs), Av, Min:46, Max:64    SpO2  Av.3 %  Min: 95 %  Max: 98 %    I/O last 3 completed shifts:  In: 480 [P.O.:480]  Out: -       General: resting in bed  Cardiovascular: Warm throughout, no edema  Respiratory: no respiratory distress, equal chest rise  Abdomen: soft,  nontender, nondistended  Skin: no obvious rashes or lesions appreciated, no jaundice  Extremities:moving all extremities      CBC  Recent Labs     24  1207 25  0324 25  1440 25  0654   WBC 6.3 6.5  --  16.2*   RBC 4.76 4.48  --  3.83   HGB 12.8 12.2 11.2* 10.4*   HCT 40.1 37.8 34.4 32.7*   MCV 84.2 84.4  --  85.4   MCH 26.9 27.2  --  27.2   MCHC 31.9* 32.3  --  31.8*   RDW 19.8* 19.8*  --  20.0*    303  --  270   MPV 9.2 9.4  --  9.3       CMP  Recent Labs     24  1207 25  0324 25  1440 25  0654 25  1019    136 136 134 136   K 4.5 4.7 4.4 5.2* 4.6    100 102 103 103   CO2 24 19* 20* 23 22   BUN 13 18 28* 33* 35*   CREATININE 0.9 1.1* 1.1* 1.1* 1.1*   GLUCOSE 112* 176* 174* 111* 130*   CALCIUM 9.4 9.2 9.6 9.1 9.4   BILITOT <0.2 <0.2  --  0.2  --    ALKPHOS 104 97  --  77  --    AST 19 18  --  17  --    ALT 18 18  --  14  --          Assessment/Plan:    Patient Active Problem List   Diagnosis    Chronic otitis externa of right ear    Essential 
GENERAL SURGERY  DAILY PROGRESS NOTE  1/6/2025    CHIEF COMPLAINT:  Chief Complaint   Patient presents with    Shortness of Breath     Pt having increased SOB 84% on ra at pivot        SUBJECTIVE:  Tolerated prep, going clear this morning    OBJECTIVE:  BP (!) 109/48   Pulse 80   Temp 97.3 °F (36.3 °C) (Oral)   Resp 20   Ht 1.524 m (5')   Wt 64.4 kg (142 lb)   SpO2 96%   BMI 27.73 kg/m²     GENERAL:  NAD. A&Ox3.  LUNGS:  No increased work of breathing.  CARDIOVASCULAR: RR  ABDOMEN:  Soft, non-distended, non-tender. No guarding, rigidity, rebound.  SKIN: Warm and dry  EXTREMITIES: Atraumatic, no focal deficits    ASSESSMENT/PLAN:  64 y.o. female with melena, EGD 1/2 without signs of bleeding.    Plan:    - Melena may be due to known cecal AVM, Cscope today 1/6   - NPO for procedure   - Monitor H&H, transfuse as indicated   - Will discuss with Dr. Batsheva Davila MD  Surgery Resident PGY-1  1/6/2025  5:24 AM    
No upper GI source of anemia. Bleeding could be from her cecal AVM. Patient is reluctant to have a colonoscopy. Hg is stable. Will observe for now.    Gabriela Henry MD  
Patient called this RN to the bathroom and noted large maroon blood filled toilet.   
Patient signed consent form for coloscopy, consent in patients folder.  
Per Dr. Davila, pt okay to d/c from Gen Surg standpoint.    Electronically signed by Hawa Melgoza RN on 1/7/2025 at 10:00 AM   
Spiritual Health History and Assessment/Progress Note  [facility]    Attempted Encounter,  ,  ,      Name: Tuyet Petersen MRN: [id number]    Age: 64 y.o.     Sex: female   Language: English   Synagogue: [employer]oro[employer]ism   COPD exacerbation (HCC)     Date: 1/2/2025                           Spiritual Assessment began in [facility] ENDOSCOPY        Referral/Consult From: Rounding   Encounter Overview/Reason: Attempted Encounter  Service Provided For: Patient not available    Aubrie, Belief, Meaning:   Patient unable to assess at this time  Family/Friends No family/friends present      Importance and Influence:  Patient unable to assess at this time  Family/Friends No family/friends present    Community:  Patient feels well-supported. Support system includes: Children  Family/Friends No family/friends present    Assessment and Plan of Care:     Patient Interventions include: Other: unable as patient is off unit for a procedure  Family/Friends Interventions include: No family/friends present    Patient Plan of Care: Spiritual Care available upon further referral  Family/Friends Plan of Care: No family/friends present    Electronically signed by Chaplain Delores on 1/2/2025 at 4:18 PM    
General: Abdomen is flat. Bowel sounds are normal. Distension: mild.      Palpations: Abdomen is soft.      Tenderness: There is no abdominal tenderness. There is no guarding.   Musculoskeletal:         General: Normal range of motion.      Right lower leg: No edema.      Left lower leg: No edema.   Skin:     General: Skin is warm and dry.      Findings: No rash.   Neurological:      General: No focal deficit present.      Mental Status: She is alert and oriented to person, place, and time.         Labs:  Recent Results (from the past 24 hour(s))   Comprehensive Metabolic Panel w/ Reflex to MG    Collection Time: 01/04/25  5:21 AM   Result Value Ref Range    Sodium 135 132 - 146 mmol/L    Potassium 4.5 3.5 - 5.0 mmol/L    Chloride 104 98 - 107 mmol/L    CO2 23 22 - 29 mmol/L    Anion Gap 8 7 - 16 mmol/L    Glucose 110 (H) 74 - 99 mg/dL    BUN 28 (H) 6 - 23 mg/dL    Creatinine 0.9 0.50 - 1.00 mg/dL    Est, Glom Filt Rate 68 >60 mL/min/1.73m2    Calcium 9.1 8.6 - 10.2 mg/dL    Total Protein 6.3 (L) 6.4 - 8.3 g/dL    Albumin 3.6 3.5 - 5.2 g/dL    Total Bilirubin <0.2 0.0 - 1.2 mg/dL    Alkaline Phosphatase 70 35 - 104 U/L    ALT 13 0 - 32 U/L    AST 13 0 - 31 U/L   CBC with Auto Differential    Collection Time: 01/04/25  5:21 AM   Result Value Ref Range    WBC 9.2 4.5 - 11.5 k/uL    RBC 3.33 (L) 3.50 - 5.50 m/uL    Hemoglobin 9.3 (L) 11.5 - 15.5 g/dL    Hematocrit 28.9 (L) 34.0 - 48.0 %    MCV 86.8 80.0 - 99.9 fL    MCH 27.9 26.0 - 35.0 pg    MCHC 32.2 32.0 - 34.5 g/dL    RDW 20.0 (H) 11.5 - 15.0 %    Platelets 269 130 - 450 k/uL    MPV 9.3 7.0 - 12.0 fL    Neutrophils % 79 43.0 - 80.0 %    Lymphocytes % 11 (L) 20.0 - 42.0 %    Monocytes % 9 2.0 - 12.0 %    Eosinophils % 0 0 - 6 %    Basophils % 0 0.0 - 2.0 %    Immature Granulocytes % 1 0.0 - 5.0 %    Neutrophils Absolute 7.28 1.80 - 7.30 k/uL    Lymphocytes Absolute 1.03 (L) 1.50 - 4.00 k/uL    Monocytes Absolute 0.78 0.10 - 0.95 k/uL    Eosinophils Absolute 0.00 (L) 
General: Skin is warm and dry.      Findings: No rash.   Neurological:      General: No focal deficit present.      Mental Status: She is alert and oriented to person, place, and time.         Labs:  No results found for this or any previous visit (from the past 24 hour(s)).      Radiology and other tests reviewed:  XR CHEST PORTABLE   Final Result   1. No acute process.   2. COPD.         CTA PULMONARY W CONTRAST   Final Result   1. No evidence of pulmonary embolism.   2. Bilateral nodular densities. Findings may represent infectious or   inflammatory process.   3. Small hiatal hernia.         XR CHEST PORTABLE   Final Result   No acute process.      Mild cardiomegaly.             Assessment:  Active Hospital Problems    Diagnosis Date Noted    History of breast cancer [Z85.3] 05/05/2022     Priority: Medium    Melanotic stools [K92.1] 01/01/2025    Respiratory syncytial virus (RSV) [B33.8] 01/01/2025    Acute hypoxic respiratory failure [J96.01] 01/01/2025    COPD exacerbation (HCC) [J44.1] 12/31/2024    RSV (respiratory syncytial virus infection) [B33.8] 12/31/2024    Irritable bowel syndrome with diarrhea [K58.0] 08/12/2024    Chronic obstructive pulmonary disease, unspecified COPD type (HCC) [J44.9] 01/12/2024    Anemia, unspecified [D64.9] 12/20/2022    Essential hypertension [I10] 12/01/2015    Gastroesophageal reflux disease [K21.9] 12/01/2015         Plan:    COPD exacerbation in setting of RSV infection with hypoxia, improving  Increased mucus production + shortness of breath. RFA + RSV PCR. Current everyday tobacco user. BNP 91, CXR with mild cardiomegaly, unlikely CHF. Trops reassuring, Last stress 2017 negative, no echo on file  unlikely cardiac. S/p IV Solumedrol 125 mg, duonebs in ED. Inconsistent with home tiotropium.   -Brovana/Pulmicort BID  -Duonebs q4 WA  -Prednisone 60 mg Daily---> Prednisone 20 mg BID  -Procal 0.10    -Doxycycline oral completed for 5 days for anti-inflammatory effect 
      Assessment:  Active Hospital Problems    Diagnosis Date Noted    History of breast cancer [Z85.3] 05/05/2022     Priority: Medium    Melanotic stools [K92.1] 01/01/2025    Respiratory syncytial virus (RSV) [B33.8] 01/01/2025    Acute hypoxic respiratory failure [J96.01] 01/01/2025    COPD exacerbation (HCC) [J44.1] 12/31/2024    RSV (respiratory syncytial virus infection) [B33.8] 12/31/2024    Irritable bowel syndrome with diarrhea [K58.0] 08/12/2024    Chronic obstructive pulmonary disease, unspecified COPD type (HCC) [J44.9] 01/12/2024    Anemia, unspecified [D64.9] 12/20/2022    Essential hypertension [I10] 12/01/2015    Gastroesophageal reflux disease [K21.9] 12/01/2015         Plan:    COPD exacerbation in setting of RSV infection with hypoxia, improving  Increased mucus production + shortness of breath. RFA + RSV PCR. Current everyday tobacco user. BNP 91, CXR with mild cardiomegaly, unlikely CHF. Trops reassuring, Last stress 2017 negative, no echo on file  unlikely cardiac. S/p IV Solumedrol 125 mg, duonebs in ED. Inconsistent with home tiotropium.   -Brovana/Pulmicort BID  -Duonebs q4 WA  -Prednisone 20 mg BID  -Procal 0.10    -Doxycycline oral completed for 5 days for anti-inflammatory effect   -Singulair daily   -Wean as tolerated to keep pulse ox >88     Maroon Stool  Follows Dr Herman[place] general surgery, history of AVMs. AM of 1/1/24 had moderate to large maroon stool.   -EGD was done on 01/02:  As per general surgery no source of upper GI anemia.  Bleeding could be from her cecal AVM.  EGD shows moderate gastritis biopsies were taken.    -FOBT positive  -Protonix 40 mg Daily (Decreased from BID)  -Holding lovenox, add PCDs  -Colonoscopy on 1/6 showed cecal AVM with adherent clot and active bleeding. Epinephrine was injected and Richardson plasma coagulation was used to control the bleeding.     Iron Def Anemia  Iron infusions, started by Dr Friedman around July 2024. Last one ~3 days ago. IGB = 
Bentyl for IBS, follows Dr [facility] general surgery for IBS, AVMs of colon with iron deficiency anemia.   PRN Zofran  Home Bentyl 20 4 x Daily     HTN  Lisinopril 5 mg daily      Code Status: Full, has ACP Docs  Diet: Diet NPO  DVT ppx: Lovenox- HELD, added PCDs  GI ppx: Protonix       Disposition: Discharge to Home pending clinical course     Vanessa Guo MD   Family Medicine Resident PGY3  01/02/25   6:30 AM   
add PCDs     Iron Def Anemia  Iron infusions, started by Dr Friedman around July 2024. Last one ~3 days ago. IGB = 12.8 on admission.  -Hgb = 9.3 today  -Consider recheck iron panel though with normal Hgb and specialist follow up outpatient likely does not need checked at this time        History of Breast Cancer  Stage I ER/NC positive, HER-2/luis negative carcinoma of the right breast. Follows with Breast Clinic- Michelle Kam CNP in collaboration with Dr. Anu Ba/Dr. Carley Hughes/Dr. Charly Forman.  Follows with Oncology- Dr Friedman. Has completed 5 years of endocrine therapy with Arimidex in 2021. Had 1 year radiation therapy 2016. No recurrent disease as of 8/5/24.   -Monitor, no need for heme/onc consult at this time      IBS  Previously on Bentyl for IBS, follows Dr Herman[facility] surgery for IBS, AVMs of colon with iron deficiency anemia.   -PRN Zofran  -Home Bentyl 20mg, two times daily (home regimen)     HTN  -Lisinopril 5 mg daily        Code Status: Full, has ACP Docs  Diet: ADULT DIET; Clear Liquid  Diet NPO  DVT ppx: Lovenox- HELD, added PCDs  GI ppx: Protonix         Disposition: Discharge to Home pending clinical course     Vladimir Johnson DO   Family Medicine Resident PGY2  01/05/25   11:35 AM   
mg, duonebs in ED. Inconsistent with home tiotropium.   Brovana/Pulmicort BID  Duonebs q4 WA  Steroid - 60 mg iv solumedrol starting 1/1/24, then can likely convert to oral prednisone   Procal 0.10    Doxycycline oral ordered for 5 days for anti-inflammatory effect (day 4/5)   Singulair daily   4 L nc wean as tolerated to keep pulse ox >88   Chest x-ray was done yesterday which showed no acute process and COPD changes     Maroon Stool  Follows Dr Henry general surgery, history of AVMs. AM of 1/1/24 had moderate to large maroon stool.   EGD was done on 01/02:  As per general surgery no source of upper GI anemia.  Bleeding could be from her cecal AVM.  Patient is reluctant to have colonoscopy.  EGD shows moderate gastritis biopsies were taken.  Continue PPI twice daily  FOBT positive  Protonix 40 mg twice daily  Hold lovenox, add PCDs    HAGMA - improved  Bicarb 19, AG 17. Patient did go large amount of time without oral intake yesterday.  - monitor labs.    Elevated Creatinine-improving  Baseline 1.0, INCREASED 0.9->1.1 in less than 24 hours. Did go long amount of time without oral intake.  -Cr 1.1> 1.1> 1.1> 1.0     Iron Def Anemia  Iron infusions, started by Dr Friedman around July 2024. Last one ~3 days ago   Hgb 12.8 on admit, 12.2 1/1/24   Consider recheck iron panel though with normal Hgb and specialist follow up outpatient likely does not need checked at this time        History of Breast Cancer  Stage I ER/[place] positive, HER-2/luis negative carcinoma of the right breast. Follows with [facility] Clinic- [facility] in collaboration with Dr. Anu Ba/Dr. Carley Hughes/Dr. Charly Forman.  Follows with Oncology- Dr Friedman. Has completed 5 years of endocrine therapy with Arimidex in 2021. Had 1 year radiation therapy 2016. No recurrent disease as of 8/5/24.   Monitor, no need for heme/onc consult at this time      IBS  Previously on Bentyl for IBS, follows Dr Henry general surgery for

## 2025-01-09 ENCOUNTER — CARE COORDINATION (OUTPATIENT)
Dept: CARE COORDINATION | Age: 65
End: 2025-01-09

## 2025-01-09 DIAGNOSIS — J44.9 CHRONIC OBSTRUCTIVE PULMONARY DISEASE, UNSPECIFIED COPD TYPE (HCC): Primary | ICD-10-CM

## 2025-01-09 LAB — SURGICAL PATHOLOGY REPORT: NORMAL

## 2025-01-09 PROCEDURE — 1111F DSCHRG MED/CURRENT MED MERGE: CPT | Performed by: FAMILY MEDICINE

## 2025-01-09 RX ORDER — FLUTICASONE PROPIONATE 50 MCG
1 SPRAY, SUSPENSION (ML) NASAL DAILY PRN
COMMUNITY

## 2025-01-09 NOTE — CARE COORDINATION
Care Transitions Note    Initial Call - Call within 2 business days of discharge: Yes    Patient Current Location:  Home: [address] [place] [address]  [address]  [place] [phone]    Care Transition Nurse contacted the patient by telephone to perform post hospital discharge assessment. Provided introduction to self, and explanation of the Care Transition Nurse role.     Patient: Tuyet Petersen    Patient : 1960   MRN: [id number]    Reason for Admission: COPD exacerbation  Discharge Date: 25  RURS: Readmission Risk Score: 10.5      Last Discharge Facility       Date Complaint Diagnosis Description Type Department Provider    24 Shortness of Breath COPD exacerbation (HCC) ... ED to Hosp-Admission (Discharged) (ADMITTED) Madhuri Duenas MD; Andrea Hernandez...            Was this an external facility discharge? No    Additional needs identified to be addressed with provider   No needs identified             Method of communication with provider: none.    Patients top risk factors for readmission: lack of knowledge about disease, level of motivation, medical condition-COPD exacerbation, +RSV, Htn, Fe deficiency, Cecal AVM with cauterization, and polypharmacy    Interventions to address risk factors:   Attend HFU appt with pcp on 25~Reviewed with pt and she is aware  Schedule and attend HFU appt with Dr. Henry as recommended at discharge~Pt aware and confirms that she has information on her discharge AVS  Encouraged O2 compliance and to f/u with her pcp regarding appropriateness of discontinuation/weaning~Reviewed with pt and she is aware and will f/u with pcp on Monday to discuss weaning/discontinuation  Nebulizer txs Q4 WA as ordered    Care Summary Note: CTN called and spoke with the pt for an initial care transition call post hospital discharge. Pt admitted on 25 for COPD exacerbation and +RSV. Pt aslo seen for cecal AVM with s/p C' scope with cauterization. Was having maroon

## 2025-01-13 ENCOUNTER — OFFICE VISIT (OUTPATIENT)
Dept: FAMILY MEDICINE CLINIC | Age: 65
End: 2025-01-13

## 2025-01-13 VITALS
OXYGEN SATURATION: 97 % | BODY MASS INDEX: 26.9 KG/M2 | WEIGHT: 137 LBS | DIASTOLIC BLOOD PRESSURE: 62 MMHG | TEMPERATURE: 98.5 F | HEART RATE: 80 BPM | HEIGHT: 60 IN | SYSTOLIC BLOOD PRESSURE: 110 MMHG

## 2025-01-13 DIAGNOSIS — B33.8 RSV INFECTION: ICD-10-CM

## 2025-01-13 DIAGNOSIS — J44.1 COPD EXACERBATION (HCC): ICD-10-CM

## 2025-01-13 DIAGNOSIS — J96.01 ACUTE HYPOXIC RESPIRATORY FAILURE: ICD-10-CM

## 2025-01-13 DIAGNOSIS — Z09 HOSPITAL DISCHARGE FOLLOW-UP: Primary | ICD-10-CM

## 2025-01-13 RX ORDER — MONTELUKAST SODIUM 10 MG/1
10 TABLET ORAL NIGHTLY
Qty: 90 TABLET | Refills: 1 | Status: SHIPPED | OUTPATIENT
Start: 2025-01-13

## 2025-01-13 RX ORDER — IPRATROPIUM BROMIDE AND ALBUTEROL SULFATE 2.5; .5 MG/3ML; MG/3ML
3 SOLUTION RESPIRATORY (INHALATION) EVERY 6 HOURS PRN
Qty: 180 ML | Refills: 0 | Status: SHIPPED | OUTPATIENT
Start: 2025-01-13

## 2025-01-13 ASSESSMENT — PATIENT HEALTH QUESTIONNAIRE - PHQ9
7. TROUBLE CONCENTRATING ON THINGS, SUCH AS READING THE NEWSPAPER OR WATCHING TELEVISION: NOT AT ALL
3. TROUBLE FALLING OR STAYING ASLEEP: NOT AT ALL
4. FEELING TIRED OR HAVING LITTLE ENERGY: NOT AT ALL
9. THOUGHTS THAT YOU WOULD BE BETTER OFF DEAD, OR OF HURTING YOURSELF: NOT AT ALL
6. FEELING BAD ABOUT YOURSELF - OR THAT YOU ARE A FAILURE OR HAVE LET YOURSELF OR YOUR FAMILY DOWN: NOT AT ALL
10. IF YOU CHECKED OFF ANY PROBLEMS, HOW DIFFICULT HAVE THESE PROBLEMS MADE IT FOR YOU TO DO YOUR WORK, TAKE CARE OF THINGS AT HOME, OR GET ALONG WITH OTHER PEOPLE: NOT DIFFICULT AT ALL
8. MOVING OR SPEAKING SO SLOWLY THAT OTHER PEOPLE COULD HAVE NOTICED. OR THE OPPOSITE, BEING SO FIGETY OR RESTLESS THAT YOU HAVE BEEN MOVING AROUND A LOT MORE THAN USUAL: NOT AT ALL
SUM OF ALL RESPONSES TO PHQ QUESTIONS 1-9: 0
SUM OF ALL RESPONSES TO PHQ9 QUESTIONS 1 & 2: 0
5. POOR APPETITE OR OVEREATING: NOT AT ALL
SUM OF ALL RESPONSES TO PHQ QUESTIONS 1-9: 0
2. FEELING DOWN, DEPRESSED OR HOPELESS: NOT AT ALL
SUM OF ALL RESPONSES TO PHQ QUESTIONS 1-9: 0
1. LITTLE INTEREST OR PLEASURE IN DOING THINGS: NOT AT ALL
SUM OF ALL RESPONSES TO PHQ QUESTIONS 1-9: 0

## 2025-01-13 NOTE — PROGRESS NOTES
Post-Discharge Transitional Care  Follow Up      Tuyet Petersen   YOB: 1960    Date of Office Visit:  1/13/2025  Date of Hospital Admission: 12/31/24  Date of Hospital Discharge: 1/7/25  Risk of hospital readmission (high >=14%. Medium >=10%) :Readmission Risk Score: 10.5      Care management risk score Rising risk (score 2-5) and Complex Care (Scores >=6): No Risk Score On File     Non face to face  following discharge, date last encounter closed (first attempt may have been earlier): 01/09/2025    Call initiated 2 business days of discharge: Yes    ASSESSMENT/PLAN:   Hospital discharge follow-up  -     FL DISCHARGE MEDS RECONCILED W/ CURRENT OUTPATIENT MED LIST  RSV infection  COPD exacerbation (HCC)  Acute hypoxic respiratory failure    Patient doing much better after recent hospitalization for acute hypoxic respiratory failure secondary to COPD exacerbation and underlying RSV infection.  Her home oxygen readings on room air show that she has progressed from needing home oxygen.  Home oxygen will be discontinued.  She was again talked about her underlying disease processes.  She should continue nebulizer treatments as needed at this point when she feels wheezy or short of breath.  She should continue her Spiriva inhaler.  She should continue smoking cessation which she is a week or 2 into now.    Medical Decision Making: moderate complexity  Return in about 3 months (around 4/13/2025).           Subjective:   HPI:  Follow up of Hospital problems/diagnosis(es): COPD exacerbation due to RSV infection    Inpatient course: Discharge summary reviewed- see chart.    Interval history/Current status: She's doing better  Her oxygen levels are improved on Room air and no longer needs to home O2.  Its been a hassle for her.   Denies SOBOE.   I feel a lot better than what I did.   Denies wheezing.   No orthopnea.   11 days without a cigarette. Cold turkey. Hasn't used or needed a patch. Needs to stay

## 2025-02-05 ENCOUNTER — OFFICE VISIT (OUTPATIENT)
Age: 65
End: 2025-02-05
Payer: MEDICARE

## 2025-02-05 VITALS
TEMPERATURE: 97.6 F | WEIGHT: 135 LBS | BODY MASS INDEX: 26.5 KG/M2 | HEART RATE: 88 BPM | OXYGEN SATURATION: 96 % | RESPIRATION RATE: 18 BRPM | DIASTOLIC BLOOD PRESSURE: 69 MMHG | HEIGHT: 60 IN | SYSTOLIC BLOOD PRESSURE: 113 MMHG

## 2025-02-05 DIAGNOSIS — K55.20 AVM (ARTERIOVENOUS MALFORMATION) OF COLON: Primary | ICD-10-CM

## 2025-02-05 PROCEDURE — 3078F DIAST BP <80 MM HG: CPT | Performed by: SURGERY

## 2025-02-05 PROCEDURE — 99213 OFFICE O/P EST LOW 20 MIN: CPT | Performed by: SURGERY

## 2025-02-05 PROCEDURE — 1126F AMNT PAIN NOTED NONE PRSNT: CPT | Performed by: SURGERY

## 2025-02-05 PROCEDURE — 1090F PRES/ABSN URINE INCON ASSESS: CPT | Performed by: SURGERY

## 2025-02-05 PROCEDURE — G8417 CALC BMI ABV UP PARAM F/U: HCPCS | Performed by: SURGERY

## 2025-02-05 PROCEDURE — 4004F PT TOBACCO SCREEN RCVD TLK: CPT | Performed by: SURGERY

## 2025-02-05 PROCEDURE — 1123F ACP DISCUSS/DSCN MKR DOCD: CPT | Performed by: SURGERY

## 2025-02-05 PROCEDURE — 1111F DSCHRG MED/CURRENT MED MERGE: CPT | Performed by: SURGERY

## 2025-02-05 PROCEDURE — 1159F MED LIST DOCD IN RCRD: CPT | Performed by: SURGERY

## 2025-02-05 PROCEDURE — G8399 PT W/DXA RESULTS DOCUMENT: HCPCS | Performed by: SURGERY

## 2025-02-05 PROCEDURE — G8427 DOCREV CUR MEDS BY ELIG CLIN: HCPCS | Performed by: SURGERY

## 2025-02-05 PROCEDURE — 3017F COLORECTAL CA SCREEN DOC REV: CPT | Performed by: SURGERY

## 2025-02-05 PROCEDURE — 3074F SYST BP LT 130 MM HG: CPT | Performed by: SURGERY

## 2025-02-05 NOTE — PROGRESS NOTES
MD Carl, General Surgery  on 2/5/2025 at 2:43 PM      Send copy of H&P to PCP, Elvis Ren MD and referring physician, Elvis Ren,*      2/5/2025

## 2025-02-07 DIAGNOSIS — K55.20 AVM (ARTERIOVENOUS MALFORMATION) OF COLON: ICD-10-CM

## 2025-02-07 LAB
HCT VFR BLD CALC: 44 % (ref 34–48)
HEMOGLOBIN: 13.3 G/DL (ref 11.5–15.5)

## 2025-02-10 ENCOUNTER — HOSPITAL ENCOUNTER (OUTPATIENT)
Dept: CT IMAGING | Age: 65
Discharge: HOME | End: 2025-02-12
Attending: INTERNAL MEDICINE
Payer: MEDICARE

## 2025-02-10 DIAGNOSIS — T45.4X5A ADVERSE EFFECT OF IRON AND ITS COMPOUNDS, INITIAL ENCOUNTER: ICD-10-CM

## 2025-02-10 PROCEDURE — 71250 CT THORAX DX C-: CPT

## 2025-04-11 SDOH — HEALTH STABILITY: PHYSICAL HEALTH: ON AVERAGE, HOW MANY DAYS PER WEEK DO YOU ENGAGE IN MODERATE TO STRENUOUS EXERCISE (LIKE A BRISK WALK)?: 2 DAYS

## 2025-04-11 ASSESSMENT — LIFESTYLE VARIABLES
HOW OFTEN DO YOU HAVE A DRINK CONTAINING ALCOHOL: 1
HOW MANY STANDARD DRINKS CONTAINING ALCOHOL DO YOU HAVE ON A TYPICAL DAY: PATIENT DOES NOT DRINK
HOW MANY STANDARD DRINKS CONTAINING ALCOHOL DO YOU HAVE ON A TYPICAL DAY: 0
HOW OFTEN DO YOU HAVE A DRINK CONTAINING ALCOHOL: NEVER
HOW OFTEN DO YOU HAVE SIX OR MORE DRINKS ON ONE OCCASION: 1

## 2025-04-11 ASSESSMENT — PATIENT HEALTH QUESTIONNAIRE - PHQ9
1. LITTLE INTEREST OR PLEASURE IN DOING THINGS: NOT AT ALL
SUM OF ALL RESPONSES TO PHQ QUESTIONS 1-9: 0
SUM OF ALL RESPONSES TO PHQ QUESTIONS 1-9: 0
2. FEELING DOWN, DEPRESSED OR HOPELESS: NOT AT ALL
SUM OF ALL RESPONSES TO PHQ QUESTIONS 1-9: 0
SUM OF ALL RESPONSES TO PHQ QUESTIONS 1-9: 0

## 2025-04-14 ENCOUNTER — OFFICE VISIT (OUTPATIENT)
Dept: FAMILY MEDICINE CLINIC | Age: 65
End: 2025-04-14
Payer: MEDICARE

## 2025-04-14 VITALS
BODY MASS INDEX: 27.48 KG/M2 | HEIGHT: 60 IN | HEART RATE: 87 BPM | OXYGEN SATURATION: 94 % | DIASTOLIC BLOOD PRESSURE: 72 MMHG | SYSTOLIC BLOOD PRESSURE: 118 MMHG | TEMPERATURE: 98.5 F | WEIGHT: 140 LBS

## 2025-04-14 DIAGNOSIS — I10 ESSENTIAL HYPERTENSION: ICD-10-CM

## 2025-04-14 DIAGNOSIS — Z00.00 INITIAL MEDICARE ANNUAL WELLNESS VISIT: Primary | ICD-10-CM

## 2025-04-14 DIAGNOSIS — J44.9 CHRONIC OBSTRUCTIVE PULMONARY DISEASE, UNSPECIFIED COPD TYPE (HCC): ICD-10-CM

## 2025-04-14 PROCEDURE — G0438 PPPS, INITIAL VISIT: HCPCS | Performed by: FAMILY MEDICINE

## 2025-04-14 PROCEDURE — 3017F COLORECTAL CA SCREEN DOC REV: CPT | Performed by: FAMILY MEDICINE

## 2025-04-14 PROCEDURE — 3074F SYST BP LT 130 MM HG: CPT | Performed by: FAMILY MEDICINE

## 2025-04-14 PROCEDURE — 1123F ACP DISCUSS/DSCN MKR DOCD: CPT | Performed by: FAMILY MEDICINE

## 2025-04-14 PROCEDURE — 3078F DIAST BP <80 MM HG: CPT | Performed by: FAMILY MEDICINE

## 2025-04-14 PROCEDURE — 1159F MED LIST DOCD IN RCRD: CPT | Performed by: FAMILY MEDICINE

## 2025-04-14 RX ORDER — LISINOPRIL 5 MG/1
5 TABLET ORAL DAILY
Qty: 90 TABLET | Refills: 1 | Status: SHIPPED | OUTPATIENT
Start: 2025-04-14

## 2025-04-14 RX ORDER — CETIRIZINE HYDROCHLORIDE 10 MG/1
10 TABLET ORAL DAILY
Qty: 30 TABLET | Refills: 0 | Status: SHIPPED | OUTPATIENT
Start: 2025-04-14

## 2025-04-14 RX ORDER — TIOTROPIUM BROMIDE 18 UG/1
18 CAPSULE ORAL; RESPIRATORY (INHALATION) DAILY
Qty: 30 CAPSULE | Refills: 5 | Status: SHIPPED | OUTPATIENT
Start: 2025-04-14

## 2025-04-14 RX ORDER — OMEPRAZOLE 20 MG/1
20 CAPSULE, DELAYED RELEASE ORAL DAILY
Qty: 90 CAPSULE | Refills: 1 | Status: SHIPPED | OUTPATIENT
Start: 2025-04-14

## 2025-04-14 RX ORDER — TRIAMCINOLONE ACETONIDE 0.25 MG/G
CREAM TOPICAL
Qty: 1 EACH | Refills: 0 | Status: SHIPPED | OUTPATIENT
Start: 2025-04-14

## 2025-04-14 ASSESSMENT — PATIENT HEALTH QUESTIONNAIRE - PHQ9
SUM OF ALL RESPONSES TO PHQ QUESTIONS 1-9: 0
2. FEELING DOWN, DEPRESSED OR HOPELESS: NOT AT ALL
SUM OF ALL RESPONSES TO PHQ QUESTIONS 1-9: 0
1. LITTLE INTEREST OR PLEASURE IN DOING THINGS: NOT AT ALL
3. TROUBLE FALLING OR STAYING ASLEEP: NOT AT ALL
8. MOVING OR SPEAKING SO SLOWLY THAT OTHER PEOPLE COULD HAVE NOTICED. OR THE OPPOSITE, BEING SO FIGETY OR RESTLESS THAT YOU HAVE BEEN MOVING AROUND A LOT MORE THAN USUAL: NOT AT ALL
SUM OF ALL RESPONSES TO PHQ QUESTIONS 1-9: 0
9. THOUGHTS THAT YOU WOULD BE BETTER OFF DEAD, OR OF HURTING YOURSELF: NOT AT ALL
7. TROUBLE CONCENTRATING ON THINGS, SUCH AS READING THE NEWSPAPER OR WATCHING TELEVISION: NOT AT ALL
6. FEELING BAD ABOUT YOURSELF - OR THAT YOU ARE A FAILURE OR HAVE LET YOURSELF OR YOUR FAMILY DOWN: NOT AT ALL
4. FEELING TIRED OR HAVING LITTLE ENERGY: NOT AT ALL
5. POOR APPETITE OR OVEREATING: NOT AT ALL
SUM OF ALL RESPONSES TO PHQ QUESTIONS 1-9: 0
10. IF YOU CHECKED OFF ANY PROBLEMS, HOW DIFFICULT HAVE THESE PROBLEMS MADE IT FOR YOU TO DO YOUR WORK, TAKE CARE OF THINGS AT HOME, OR GET ALONG WITH OTHER PEOPLE: NOT DIFFICULT AT ALL

## 2025-04-14 NOTE — PROGRESS NOTES
Medicare Annual Wellness Visit    Tuyet Petersen is here for Medicare AWV and Sinus Problem (Possible allergy issue x 3 months)    Assessment & Plan  1. Sinusitis, likely allergic.  - Symptoms include nasal congestion, itchy ears, and postnasal drip.  - Continue using Flonase in the morning and Singulair at night.  - A prescription for Zyrtec will be sent to [facility] to be taken in the morning with other medications.  - The patient is advised to avoid Zyrtec-D with a decongestant.    2. Allergic conjunctivitis.  - Symptoms include watery and itchy eyes.  - The patient is advised to use Pataday eye drops consistently for the next 2 weeks to assess their effectiveness.  - The patient has an upcoming eye appointment to determine if symptoms are due to dry eye or allergies.  - Over-the-counter eye drops provide temporary relief.    3. Otitis media with effusion.  - Symptoms include fluid behind the eardrum causing itchiness and a feeling of drainage.  - The ear canal appears healthy.  - A prescription for triamcinolone cream will be provided for use as needed for itchiness.  - The patient is informed that the cream will not address the fluid behind the eardrum.    4. Chronic obstructive pulmonary disease (COPD).  - The patient reports no significant changes with Spiriva but is advised to continue its use as it provides subtle long-term benefits.  - The patient is advised to get the RSV vaccine at the pharmacy to boost immunity against respiratory infections that can exacerbate COPD.  - Recent CT scan shows a stable 5 mm nodule in the left lung, likely a lymph node, with no new or threatening findings.  - Hemoglobin levels have improved to 13, up from 9 in January.    5. Health maintenance.  - The patient is advised to get the influenza vaccine in late September or early October.  - Blood work will be done at the next visit.    Follow-up  The patient will follow up in 6 months.  Initial Medicare annual wellness

## 2025-06-09 DIAGNOSIS — Z12.31 VISIT FOR SCREENING MAMMOGRAM: ICD-10-CM

## 2025-06-09 DIAGNOSIS — Z85.3 PERSONAL HISTORY OF BREAST CANCER: Primary | ICD-10-CM

## 2025-08-11 ENCOUNTER — OFFICE VISIT (OUTPATIENT)
Dept: BREAST CENTER | Age: 65
End: 2025-08-11
Payer: MEDICARE

## 2025-08-11 VITALS
HEIGHT: 60 IN | DIASTOLIC BLOOD PRESSURE: 80 MMHG | RESPIRATION RATE: 16 BRPM | OXYGEN SATURATION: 97 % | HEART RATE: 70 BPM | SYSTOLIC BLOOD PRESSURE: 138 MMHG | BODY MASS INDEX: 27.48 KG/M2 | WEIGHT: 140 LBS | TEMPERATURE: 97.9 F

## 2025-08-11 DIAGNOSIS — Z85.3 PERSONAL HISTORY OF BREAST CANCER: Primary | ICD-10-CM

## 2025-08-11 PROCEDURE — 1090F PRES/ABSN URINE INCON ASSESS: CPT | Performed by: NURSE PRACTITIONER

## 2025-08-11 PROCEDURE — 1160F RVW MEDS BY RX/DR IN RCRD: CPT | Performed by: NURSE PRACTITIONER

## 2025-08-11 PROCEDURE — 4004F PT TOBACCO SCREEN RCVD TLK: CPT | Performed by: NURSE PRACTITIONER

## 2025-08-11 PROCEDURE — 1159F MED LIST DOCD IN RCRD: CPT | Performed by: NURSE PRACTITIONER

## 2025-08-11 PROCEDURE — G8427 DOCREV CUR MEDS BY ELIG CLIN: HCPCS | Performed by: NURSE PRACTITIONER

## 2025-08-11 PROCEDURE — 3075F SYST BP GE 130 - 139MM HG: CPT | Performed by: NURSE PRACTITIONER

## 2025-08-11 PROCEDURE — 1123F ACP DISCUSS/DSCN MKR DOCD: CPT | Performed by: NURSE PRACTITIONER

## 2025-08-11 PROCEDURE — 3079F DIAST BP 80-89 MM HG: CPT | Performed by: NURSE PRACTITIONER

## 2025-08-11 PROCEDURE — 3017F COLORECTAL CA SCREEN DOC REV: CPT | Performed by: NURSE PRACTITIONER

## 2025-08-11 PROCEDURE — 99213 OFFICE O/P EST LOW 20 MIN: CPT | Performed by: NURSE PRACTITIONER

## 2025-08-11 PROCEDURE — G8399 PT W/DXA RESULTS DOCUMENT: HCPCS | Performed by: NURSE PRACTITIONER

## 2025-08-11 PROCEDURE — G8417 CALC BMI ABV UP PARAM F/U: HCPCS | Performed by: NURSE PRACTITIONER

## 2025-08-11 ASSESSMENT — ENCOUNTER SYMPTOMS
RECTAL PAIN: 0
ANAL BLEEDING: 0

## (undated) DEVICE — GRADUATE TRIANG MEASURE 1000ML BLK PRNT

## (undated) DEVICE — BLOCK BITE 60FR RUBBER ADLT DENTAL

## (undated) DEVICE — SPONGE GZ W4XL4IN RAYON POLY FILL CVR W/ NONWOVEN FAB

## (undated) DEVICE — FORCEPS BX OVL CUP FEN DISPOSABLE CAP L 160CM RAD JAW 4

## (undated) DEVICE — SPONGE GZ W4XL4IN RAYON POLY CVR W/NONWOVEN FAB STRL 2/PK

## (undated) DEVICE — FORCEPS BX L240CM JAW DIA2.4MM ORNG L CAP W/ NDL DISP RAD

## (undated) DEVICE — ELECTRODE PT RET AD L9FT HI MOIST COND ADH HYDRGEL CORDED

## (undated) DEVICE — FIAPC® PROBE W/ FILTER 2200 A OD 2.3MM/6.9FR; L 2.2M/7.2FT: Brand: ERBE